# Patient Record
Sex: MALE | Race: WHITE | NOT HISPANIC OR LATINO | URBAN - METROPOLITAN AREA
[De-identification: names, ages, dates, MRNs, and addresses within clinical notes are randomized per-mention and may not be internally consistent; named-entity substitution may affect disease eponyms.]

---

## 2020-12-31 ENCOUNTER — INPATIENT (INPATIENT)
Facility: HOSPITAL | Age: 85
LOS: 14 days | Discharge: NURSING, REHAB OR LONG-TERM CARE | DRG: 870 | End: 2021-01-15
Attending: INTERNAL MEDICINE | Admitting: INTERNAL MEDICINE
Payer: MEDICARE

## 2020-12-31 VITALS — WEIGHT: 132.28 LBS

## 2020-12-31 LAB
ALBUMIN SERPL ELPH-MCNC: 2.8 G/DL — LOW (ref 3.3–5)
ALBUMIN SERPL ELPH-MCNC: 3.2 G/DL — LOW (ref 3.4–5)
ALP SERPL-CCNC: 60 U/L — SIGNIFICANT CHANGE UP (ref 40–120)
ALP SERPL-CCNC: 71 U/L — SIGNIFICANT CHANGE UP (ref 40–120)
ALT FLD-CCNC: 324 U/L — HIGH (ref 12–42)
ALT FLD-CCNC: 4551 U/L — HIGH (ref 10–45)
ANION GAP SERPL CALC-SCNC: 26 MMOL/L — HIGH (ref 9–16)
ANION GAP SERPL CALC-SCNC: 28 MMOL/L — HIGH (ref 9–16)
ANION GAP SERPL CALC-SCNC: 34 MMOL/L — HIGH (ref 5–17)
APPEARANCE UR: CLEAR — SIGNIFICANT CHANGE UP
APTT BLD: 52.3 SEC — HIGH (ref 27.5–35.5)
AST SERPL-CCNC: 540 U/L — HIGH (ref 15–37)
AST SERPL-CCNC: >7000 U/L — SIGNIFICANT CHANGE UP (ref 10–40)
BASE EXCESS BLDA CALC-SCNC: -13.7 MMOL/L — LOW (ref -2–3)
BASE EXCESS BLDA CALC-SCNC: -20.8 MMOL/L — LOW (ref -2–3)
BASOPHILS # BLD AUTO: 0 K/UL — SIGNIFICANT CHANGE UP (ref 0–0.2)
BASOPHILS # BLD AUTO: 0.01 K/UL — SIGNIFICANT CHANGE UP (ref 0–0.2)
BASOPHILS NFR BLD AUTO: 0 % — SIGNIFICANT CHANGE UP (ref 0–2)
BASOPHILS NFR BLD AUTO: 0.1 % — SIGNIFICANT CHANGE UP (ref 0–2)
BILIRUB SERPL-MCNC: 1 MG/DL — SIGNIFICANT CHANGE UP (ref 0.2–1.2)
BILIRUB SERPL-MCNC: 1.4 MG/DL — HIGH (ref 0.2–1.2)
BILIRUB UR-MCNC: NEGATIVE — SIGNIFICANT CHANGE UP
BLD GP AB SCN SERPL QL: NEGATIVE — SIGNIFICANT CHANGE UP
BLD GP AB SCN SERPL QL: NEGATIVE — SIGNIFICANT CHANGE UP
BUN SERPL-MCNC: 33 MG/DL — HIGH (ref 7–23)
BUN SERPL-MCNC: 35 MG/DL — HIGH (ref 7–23)
BUN SERPL-MCNC: 36 MG/DL — HIGH (ref 7–23)
CALCIUM SERPL-MCNC: 7.7 MG/DL — LOW (ref 8.4–10.5)
CALCIUM SERPL-MCNC: 8.2 MG/DL — LOW (ref 8.5–10.5)
CALCIUM SERPL-MCNC: 9.7 MG/DL — SIGNIFICANT CHANGE UP (ref 8.5–10.5)
CHLORIDE SERPL-SCNC: 105 MMOL/L — SIGNIFICANT CHANGE UP (ref 96–108)
CHLORIDE SERPL-SCNC: 108 MMOL/L — SIGNIFICANT CHANGE UP (ref 96–108)
CHLORIDE SERPL-SCNC: 109 MMOL/L — HIGH (ref 96–108)
CK MB CFR SERPL CALC: 204.4 NG/ML — HIGH (ref 0–6.7)
CK SERPL-CCNC: 3867 U/L — HIGH (ref 39–308)
CK SERPL-CCNC: 9158 U/L — HIGH (ref 30–200)
CO2 SERPL-SCNC: 12 MMOL/L — LOW (ref 22–31)
CO2 SERPL-SCNC: 5 MMOL/L — CRITICAL LOW (ref 22–31)
CO2 SERPL-SCNC: 9 MMOL/L — CRITICAL LOW (ref 22–31)
COLOR SPEC: YELLOW — SIGNIFICANT CHANGE UP
CREAT SERPL-MCNC: 2.17 MG/DL — HIGH (ref 0.5–1.3)
CREAT SERPL-MCNC: 2.72 MG/DL — HIGH (ref 0.5–1.3)
CREAT SERPL-MCNC: 2.93 MG/DL — HIGH (ref 0.5–1.3)
DIFF PNL FLD: NEGATIVE — SIGNIFICANT CHANGE UP
EOSINOPHIL # BLD AUTO: 0.02 K/UL — SIGNIFICANT CHANGE UP (ref 0–0.5)
EOSINOPHIL # BLD AUTO: 0.03 K/UL — SIGNIFICANT CHANGE UP (ref 0–0.5)
EOSINOPHIL NFR BLD AUTO: 0.2 % — SIGNIFICANT CHANGE UP (ref 0–6)
EOSINOPHIL NFR BLD AUTO: 0.3 % — SIGNIFICANT CHANGE UP (ref 0–6)
ETHANOL SERPL-MCNC: 26 MG/DL — HIGH
GAS PNL BLDA: SIGNIFICANT CHANGE UP
GLUCOSE BLDC GLUCOMTR-MCNC: 80 MG/DL — SIGNIFICANT CHANGE UP (ref 70–99)
GLUCOSE SERPL-MCNC: 166 MG/DL — HIGH (ref 70–99)
GLUCOSE SERPL-MCNC: 498 MG/DL — CRITICAL HIGH (ref 70–99)
GLUCOSE SERPL-MCNC: 92 MG/DL — SIGNIFICANT CHANGE UP (ref 70–99)
GLUCOSE UR QL: NEGATIVE — SIGNIFICANT CHANGE UP
HCO3 BLDA-SCNC: 11 MMOL/L — LOW (ref 21–28)
HCO3 BLDA-SCNC: 6 MMOL/L — CRITICAL LOW (ref 21–28)
HCT VFR BLD CALC: 18.4 % — CRITICAL LOW (ref 39–50)
HCT VFR BLD CALC: 26.7 % — LOW (ref 39–50)
HGB BLD-MCNC: 5.8 G/DL — CRITICAL LOW (ref 13–17)
HGB BLD-MCNC: 8.2 G/DL — LOW (ref 13–17)
IMM GRANULOCYTES NFR BLD AUTO: 0.7 % — SIGNIFICANT CHANGE UP (ref 0–1.5)
IMM GRANULOCYTES NFR BLD AUTO: 0.8 % — SIGNIFICANT CHANGE UP (ref 0–1.5)
INR BLD: 1.34 — HIGH (ref 0.88–1.16)
INR BLD: 1.99 — HIGH (ref 0.88–1.16)
KETONES UR-MCNC: NEGATIVE — SIGNIFICANT CHANGE UP
LACTATE SERPL-SCNC: 11.5 MMOL/L — CRITICAL HIGH (ref 0.5–2)
LACTATE SERPL-SCNC: 18 MMOL/L — CRITICAL HIGH (ref 0.5–2)
LACTATE SERPL-SCNC: >15 MMOL/L — CRITICAL HIGH (ref 0.4–2)
LACTATE SERPL-SCNC: >15 MMOL/L — CRITICAL HIGH (ref 0.4–2)
LEUKOCYTE ESTERASE UR-ACNC: NEGATIVE — SIGNIFICANT CHANGE UP
LYMPHOCYTES # BLD AUTO: 0.63 K/UL — LOW (ref 1–3.3)
LYMPHOCYTES # BLD AUTO: 0.7 K/UL — LOW (ref 1–3.3)
LYMPHOCYTES # BLD AUTO: 6.6 % — LOW (ref 13–44)
LYMPHOCYTES # BLD AUTO: 7 % — LOW (ref 13–44)
MAGNESIUM SERPL-MCNC: 2.2 MG/DL — SIGNIFICANT CHANGE UP (ref 1.6–2.6)
MAGNESIUM SERPL-MCNC: 3.2 MG/DL — HIGH (ref 1.6–2.6)
MCHC RBC-ENTMCNC: 30 PG — SIGNIFICANT CHANGE UP (ref 27–34)
MCHC RBC-ENTMCNC: 30.5 PG — SIGNIFICANT CHANGE UP (ref 27–34)
MCHC RBC-ENTMCNC: 30.7 GM/DL — LOW (ref 32–36)
MCHC RBC-ENTMCNC: 31.5 GM/DL — LOW (ref 32–36)
MCV RBC AUTO: 96.8 FL — SIGNIFICANT CHANGE UP (ref 80–100)
MCV RBC AUTO: 97.8 FL — SIGNIFICANT CHANGE UP (ref 80–100)
MONOCYTES # BLD AUTO: 0.36 K/UL — SIGNIFICANT CHANGE UP (ref 0–0.9)
MONOCYTES # BLD AUTO: 0.53 K/UL — SIGNIFICANT CHANGE UP (ref 0–0.9)
MONOCYTES NFR BLD AUTO: 3.6 % — SIGNIFICANT CHANGE UP (ref 2–14)
MONOCYTES NFR BLD AUTO: 5.6 % — SIGNIFICANT CHANGE UP (ref 2–14)
NEUTROPHILS # BLD AUTO: 8.25 K/UL — HIGH (ref 1.8–7.4)
NEUTROPHILS # BLD AUTO: 8.86 K/UL — HIGH (ref 1.8–7.4)
NEUTROPHILS NFR BLD AUTO: 86.6 % — HIGH (ref 43–77)
NEUTROPHILS NFR BLD AUTO: 88.5 % — HIGH (ref 43–77)
NITRITE UR-MCNC: NEGATIVE — SIGNIFICANT CHANGE UP
NRBC # BLD: 0 /100 WBCS — SIGNIFICANT CHANGE UP (ref 0–0)
NRBC # BLD: 0 /100 WBCS — SIGNIFICANT CHANGE UP (ref 0–0)
NT-PROBNP SERPL-SCNC: 363 PG/ML — HIGH
NT-PROBNP SERPL-SCNC: 438 PG/ML — HIGH (ref 0–300)
PCO2 BLDA: 15 MMHG — LOW (ref 35–48)
PCO2 BLDA: 23 MMHG — LOW (ref 35–48)
PCO2 BLDV: 30 MMHG — LOW (ref 41–51)
PCP SPEC-MCNC: SIGNIFICANT CHANGE UP
PH BLDA: 7.19 — CRITICAL LOW (ref 7.35–7.45)
PH BLDA: 7.3 — LOW (ref 7.35–7.45)
PH BLDV: 6.98 — CRITICAL LOW (ref 7.32–7.43)
PH UR: 5.5 — SIGNIFICANT CHANGE UP (ref 5–8)
PHOSPHATE SERPL-MCNC: 9.3 MG/DL — HIGH (ref 2.5–4.5)
PLATELET # BLD AUTO: 56 K/UL — LOW (ref 150–400)
PLATELET # BLD AUTO: 70 K/UL — LOW (ref 150–400)
PO2 BLDA: 265 MMHG — HIGH (ref 83–108)
PO2 BLDA: 533 MMHG — HIGH (ref 83–108)
PO2 BLDV: 16 MMHG — LOW (ref 35–40)
POTASSIUM SERPL-MCNC: 4.9 MMOL/L — SIGNIFICANT CHANGE UP (ref 3.5–5.3)
POTASSIUM SERPL-MCNC: 5.5 MMOL/L — HIGH (ref 3.5–5.3)
POTASSIUM SERPL-MCNC: 6.7 MMOL/L — CRITICAL HIGH (ref 3.5–5.3)
POTASSIUM SERPL-SCNC: 4.9 MMOL/L — SIGNIFICANT CHANGE UP (ref 3.5–5.3)
POTASSIUM SERPL-SCNC: 5.5 MMOL/L — HIGH (ref 3.5–5.3)
POTASSIUM SERPL-SCNC: 6.7 MMOL/L — CRITICAL HIGH (ref 3.5–5.3)
PROT SERPL-MCNC: 5 G/DL — LOW (ref 6–8.3)
PROT SERPL-MCNC: 6.7 G/DL — SIGNIFICANT CHANGE UP (ref 6.4–8.2)
PROT UR-MCNC: NEGATIVE MG/DL — SIGNIFICANT CHANGE UP
PROTHROM AB SERPL-ACNC: 15.6 SEC — HIGH (ref 10.6–13.6)
PROTHROM AB SERPL-ACNC: 23.1 SEC — HIGH (ref 10.6–13.6)
RBC # BLD: 1.9 M/UL — LOW (ref 4.2–5.8)
RBC # BLD: 2.73 M/UL — LOW (ref 4.2–5.8)
RBC # FLD: 14.6 % — HIGH (ref 10.3–14.5)
RBC # FLD: 14.7 % — HIGH (ref 10.3–14.5)
RH IG SCN BLD-IMP: POSITIVE — SIGNIFICANT CHANGE UP
RH IG SCN BLD-IMP: POSITIVE — SIGNIFICANT CHANGE UP
SAO2 % BLDA: 100 % — SIGNIFICANT CHANGE UP (ref 95–100)
SAO2 % BLDA: 99 % — SIGNIFICANT CHANGE UP (ref 95–100)
SAO2 % BLDV: 11 % — SIGNIFICANT CHANGE UP
SARS-COV-2 RNA SPEC QL NAA+PROBE: SIGNIFICANT CHANGE UP
SODIUM SERPL-SCNC: 144 MMOL/L — SIGNIFICANT CHANGE UP (ref 132–145)
SODIUM SERPL-SCNC: 145 MMOL/L — SIGNIFICANT CHANGE UP (ref 132–145)
SODIUM SERPL-SCNC: 147 MMOL/L — HIGH (ref 135–145)
SP GR SPEC: <=1.005 — SIGNIFICANT CHANGE UP (ref 1–1.03)
TROPONIN I SERPL-MCNC: 0.25 NG/ML — HIGH (ref 0.02–0.06)
TROPONIN T SERPL-MCNC: 0.14 NG/ML — CRITICAL HIGH (ref 0–0.01)
UROBILINOGEN FLD QL: 0.2 E.U./DL — SIGNIFICANT CHANGE UP
WBC # BLD: 10.01 K/UL — SIGNIFICANT CHANGE UP (ref 3.8–10.5)
WBC # BLD: 9.53 K/UL — SIGNIFICANT CHANGE UP (ref 3.8–10.5)
WBC # FLD AUTO: 10.01 K/UL — SIGNIFICANT CHANGE UP (ref 3.8–10.5)
WBC # FLD AUTO: 9.53 K/UL — SIGNIFICANT CHANGE UP (ref 3.8–10.5)

## 2020-12-31 PROCEDURE — 93010 ELECTROCARDIOGRAM REPORT: CPT

## 2020-12-31 PROCEDURE — 76937 US GUIDE VASCULAR ACCESS: CPT | Mod: 26

## 2020-12-31 PROCEDURE — 36000 PLACE NEEDLE IN VEIN: CPT | Mod: 59

## 2020-12-31 PROCEDURE — 71045 X-RAY EXAM CHEST 1 VIEW: CPT | Mod: 26,77

## 2020-12-31 PROCEDURE — 36010 PLACE CATHETER IN VEIN: CPT

## 2020-12-31 PROCEDURE — 43753 TX GASTRO INTUB W/ASP: CPT

## 2020-12-31 PROCEDURE — 99291 CRITICAL CARE FIRST HOUR: CPT

## 2020-12-31 PROCEDURE — 74174 CTA ABD&PLVS W/CONTRAST: CPT | Mod: 26

## 2020-12-31 PROCEDURE — 36620 INSERTION CATHETER ARTERY: CPT

## 2020-12-31 PROCEDURE — 70450 CT HEAD/BRAIN W/O DYE: CPT | Mod: 26

## 2020-12-31 PROCEDURE — 71275 CT ANGIOGRAPHY CHEST: CPT | Mod: 26

## 2020-12-31 PROCEDURE — 71045 X-RAY EXAM CHEST 1 VIEW: CPT | Mod: 26

## 2020-12-31 RX ORDER — OLANZAPINE 15 MG/1
10 TABLET, FILM COATED ORAL ONCE
Refills: 0 | Status: DISCONTINUED | OUTPATIENT
Start: 2020-12-31 | End: 2020-12-31

## 2020-12-31 RX ORDER — ACETYLCYSTEINE 200 MG/ML
9 VIAL (ML) MISCELLANEOUS ONCE
Refills: 0 | Status: COMPLETED | OUTPATIENT
Start: 2020-12-31 | End: 2021-01-01

## 2020-12-31 RX ORDER — DEXTROSE 50 % IN WATER 50 %
50 SYRINGE (ML) INTRAVENOUS ONCE
Refills: 0 | Status: COMPLETED | OUTPATIENT
Start: 2020-12-31 | End: 2020-12-31

## 2020-12-31 RX ORDER — ACETYLCYSTEINE 200 MG/ML
3 VIAL (ML) MISCELLANEOUS ONCE
Refills: 0 | Status: COMPLETED | OUTPATIENT
Start: 2020-12-31 | End: 2021-01-01

## 2020-12-31 RX ORDER — SODIUM BICARBONATE 1 MEQ/ML
0.5 SYRINGE (ML) INTRAVENOUS
Qty: 150 | Refills: 0 | Status: DISCONTINUED | OUTPATIENT
Start: 2020-12-31 | End: 2020-12-31

## 2020-12-31 RX ORDER — SODIUM BICARBONATE 1 MEQ/ML
50 SYRINGE (ML) INTRAVENOUS ONCE
Refills: 0 | Status: COMPLETED | OUTPATIENT
Start: 2020-12-31 | End: 2020-12-31

## 2020-12-31 RX ORDER — SODIUM BICARBONATE 1 MEQ/ML
50 SYRINGE (ML) INTRAVENOUS ONCE
Refills: 0 | Status: DISCONTINUED | OUTPATIENT
Start: 2020-12-31 | End: 2020-12-31

## 2020-12-31 RX ORDER — OCTREOTIDE ACETATE 200 UG/ML
50 INJECTION, SOLUTION INTRAVENOUS; SUBCUTANEOUS
Qty: 500 | Refills: 0 | Status: DISCONTINUED | OUTPATIENT
Start: 2020-12-31 | End: 2021-01-01

## 2020-12-31 RX ORDER — SODIUM CHLORIDE 9 MG/ML
1000 INJECTION, SOLUTION INTRAVENOUS
Refills: 0 | Status: DISCONTINUED | OUTPATIENT
Start: 2020-12-31 | End: 2020-12-31

## 2020-12-31 RX ORDER — FENTANYL CITRATE 50 UG/ML
50 INJECTION INTRAVENOUS ONCE
Refills: 0 | Status: DISCONTINUED | OUTPATIENT
Start: 2020-12-31 | End: 2020-12-31

## 2020-12-31 RX ORDER — ACETYLCYSTEINE 200 MG/ML
6 VIAL (ML) MISCELLANEOUS ONCE
Refills: 0 | Status: COMPLETED | OUTPATIENT
Start: 2020-12-31 | End: 2021-01-01

## 2020-12-31 RX ORDER — ETOMIDATE 2 MG/ML
20 INJECTION INTRAVENOUS ONCE
Refills: 0 | Status: DISCONTINUED | OUTPATIENT
Start: 2020-12-31 | End: 2020-12-31

## 2020-12-31 RX ORDER — VASOPRESSIN 20 [USP'U]/ML
0.04 INJECTION INTRAVENOUS
Qty: 50 | Refills: 0 | Status: DISCONTINUED | OUTPATIENT
Start: 2020-12-31 | End: 2021-01-01

## 2020-12-31 RX ORDER — SODIUM BICARBONATE 1 MEQ/ML
1 SYRINGE (ML) INTRAVENOUS
Qty: 150 | Refills: 0 | Status: DISCONTINUED | OUTPATIENT
Start: 2020-12-31 | End: 2020-12-31

## 2020-12-31 RX ORDER — PANTOPRAZOLE SODIUM 20 MG/1
8 TABLET, DELAYED RELEASE ORAL
Qty: 80 | Refills: 0 | Status: DISCONTINUED | OUTPATIENT
Start: 2020-12-31 | End: 2021-01-01

## 2020-12-31 RX ORDER — CALCIUM GLUCONATE 100 MG/ML
2 VIAL (ML) INTRAVENOUS ONCE
Refills: 0 | Status: COMPLETED | OUTPATIENT
Start: 2020-12-31 | End: 2020-12-31

## 2020-12-31 RX ORDER — INSULIN HUMAN 100 [IU]/ML
10 INJECTION, SOLUTION SUBCUTANEOUS ONCE
Refills: 0 | Status: COMPLETED | OUTPATIENT
Start: 2020-12-31 | End: 2020-12-31

## 2020-12-31 RX ORDER — PHYTONADIONE (VIT K1) 5 MG
5 TABLET ORAL ONCE
Refills: 0 | Status: COMPLETED | OUTPATIENT
Start: 2020-12-31 | End: 2020-12-31

## 2020-12-31 RX ORDER — SODIUM BICARBONATE 1 MEQ/ML
0.25 SYRINGE (ML) INTRAVENOUS
Qty: 150 | Refills: 0 | Status: DISCONTINUED | OUTPATIENT
Start: 2020-12-31 | End: 2021-01-01

## 2020-12-31 RX ORDER — PROPOFOL 10 MG/ML
5 INJECTION, EMULSION INTRAVENOUS
Qty: 1000 | Refills: 0 | Status: DISCONTINUED | OUTPATIENT
Start: 2020-12-31 | End: 2021-01-06

## 2020-12-31 RX ORDER — SODIUM CHLORIDE 9 MG/ML
1000 INJECTION INTRAMUSCULAR; INTRAVENOUS; SUBCUTANEOUS ONCE
Refills: 0 | Status: COMPLETED | OUTPATIENT
Start: 2020-12-31 | End: 2020-12-31

## 2020-12-31 RX ORDER — CHLORHEXIDINE GLUCONATE 213 G/1000ML
15 SOLUTION TOPICAL EVERY 12 HOURS
Refills: 0 | Status: DISCONTINUED | OUTPATIENT
Start: 2020-12-31 | End: 2021-01-07

## 2020-12-31 RX ORDER — SODIUM CHLORIDE 9 MG/ML
1850 INJECTION INTRAMUSCULAR; INTRAVENOUS; SUBCUTANEOUS ONCE
Refills: 0 | Status: COMPLETED | OUTPATIENT
Start: 2020-12-31 | End: 2020-12-31

## 2020-12-31 RX ORDER — ROCURONIUM BROMIDE 10 MG/ML
80 VIAL (ML) INTRAVENOUS ONCE
Refills: 0 | Status: DISCONTINUED | OUTPATIENT
Start: 2020-12-31 | End: 2020-12-31

## 2020-12-31 RX ORDER — MIDAZOLAM HYDROCHLORIDE 1 MG/ML
2 INJECTION, SOLUTION INTRAMUSCULAR; INTRAVENOUS ONCE
Refills: 0 | Status: DISCONTINUED | OUTPATIENT
Start: 2020-12-31 | End: 2020-12-31

## 2020-12-31 RX ORDER — NOREPINEPHRINE BITARTRATE/D5W 8 MG/250ML
0.05 PLASTIC BAG, INJECTION (ML) INTRAVENOUS
Qty: 8 | Refills: 0 | Status: DISCONTINUED | OUTPATIENT
Start: 2020-12-31 | End: 2021-01-01

## 2020-12-31 RX ORDER — CEFTRIAXONE 500 MG/1
1000 INJECTION, POWDER, FOR SOLUTION INTRAMUSCULAR; INTRAVENOUS ONCE
Refills: 0 | Status: COMPLETED | OUTPATIENT
Start: 2020-12-31 | End: 2020-12-31

## 2020-12-31 RX ORDER — CHLORHEXIDINE GLUCONATE 213 G/1000ML
1 SOLUTION TOPICAL
Refills: 0 | Status: DISCONTINUED | OUTPATIENT
Start: 2020-12-31 | End: 2021-01-05

## 2020-12-31 RX ORDER — PHYTONADIONE (VIT K1) 5 MG
10 TABLET ORAL ONCE
Refills: 0 | Status: DISCONTINUED | OUTPATIENT
Start: 2020-12-31 | End: 2020-12-31

## 2020-12-31 RX ORDER — OLANZAPINE 15 MG/1
10 TABLET, FILM COATED ORAL ONCE
Refills: 0 | Status: COMPLETED | OUTPATIENT
Start: 2020-12-31 | End: 2020-12-31

## 2020-12-31 RX ORDER — THIAMINE MONONITRATE (VIT B1) 100 MG
100 TABLET ORAL DAILY
Refills: 0 | Status: DISCONTINUED | OUTPATIENT
Start: 2020-12-31 | End: 2021-01-01

## 2020-12-31 RX ORDER — PROPOFOL 10 MG/ML
20 INJECTION, EMULSION INTRAVENOUS
Qty: 1000 | Refills: 0 | Status: DISCONTINUED | OUTPATIENT
Start: 2020-12-31 | End: 2020-12-31

## 2020-12-31 RX ADMIN — Medication 4.26 MICROGRAM(S)/KG/MIN: at 14:29

## 2020-12-31 RX ADMIN — SODIUM CHLORIDE 1000 MILLILITER(S): 9 INJECTION INTRAMUSCULAR; INTRAVENOUS; SUBCUTANEOUS at 15:02

## 2020-12-31 RX ADMIN — FENTANYL CITRATE 50 MICROGRAM(S): 50 INJECTION INTRAVENOUS at 19:49

## 2020-12-31 RX ADMIN — CEFTRIAXONE 1000 MILLIGRAM(S): 500 INJECTION, POWDER, FOR SOLUTION INTRAMUSCULAR; INTRAVENOUS at 12:18

## 2020-12-31 RX ADMIN — SODIUM CHLORIDE 1850 MILLILITER(S): 9 INJECTION INTRAMUSCULAR; INTRAVENOUS; SUBCUTANEOUS at 11:38

## 2020-12-31 RX ADMIN — VASOPRESSIN 2.4 UNIT(S)/MIN: 20 INJECTION INTRAVENOUS at 23:11

## 2020-12-31 RX ADMIN — FENTANYL CITRATE 50 MICROGRAM(S): 50 INJECTION INTRAVENOUS at 20:20

## 2020-12-31 RX ADMIN — FENTANYL CITRATE 50 MICROGRAM(S): 50 INJECTION INTRAVENOUS at 20:04

## 2020-12-31 RX ADMIN — Medication 4.26 MICROGRAM(S)/KG/MIN: at 18:25

## 2020-12-31 RX ADMIN — MIDAZOLAM HYDROCHLORIDE 2 MILLIGRAM(S): 1 INJECTION, SOLUTION INTRAMUSCULAR; INTRAVENOUS at 20:05

## 2020-12-31 RX ADMIN — SODIUM CHLORIDE 1000 MILLILITER(S): 9 INJECTION INTRAMUSCULAR; INTRAVENOUS; SUBCUTANEOUS at 14:28

## 2020-12-31 RX ADMIN — Medication 200 GRAM(S): at 12:41

## 2020-12-31 RX ADMIN — Medication 151 MEQ/KG/HR: at 17:04

## 2020-12-31 RX ADMIN — Medication 100 MEQ/KG/HR: at 19:47

## 2020-12-31 RX ADMIN — Medication 50 MILLIEQUIVALENT(S): at 18:51

## 2020-12-31 RX ADMIN — SODIUM CHLORIDE 4000 MILLILITER(S): 9 INJECTION INTRAMUSCULAR; INTRAVENOUS; SUBCUTANEOUS at 19:48

## 2020-12-31 RX ADMIN — CEFTRIAXONE 100 MILLIGRAM(S): 500 INJECTION, POWDER, FOR SOLUTION INTRAMUSCULAR; INTRAVENOUS at 11:37

## 2020-12-31 RX ADMIN — Medication 50 MILLIEQUIVALENT(S): at 16:10

## 2020-12-31 RX ADMIN — PROPOFOL 1.8 MICROGRAM(S)/KG/MIN: 10 INJECTION, EMULSION INTRAVENOUS at 19:48

## 2020-12-31 RX ADMIN — Medication 2 GRAM(S): at 13:17

## 2020-12-31 RX ADMIN — OLANZAPINE 10 MILLIGRAM(S): 15 TABLET, FILM COATED ORAL at 10:45

## 2020-12-31 RX ADMIN — Medication 50 MILLILITER(S): at 11:38

## 2020-12-31 RX ADMIN — OCTREOTIDE ACETATE 10 MICROGRAM(S)/HR: 200 INJECTION, SOLUTION INTRAVENOUS; SUBCUTANEOUS at 21:08

## 2020-12-31 RX ADMIN — INSULIN HUMAN 10 UNIT(S): 100 INJECTION, SOLUTION SUBCUTANEOUS at 12:41
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
## 2020-12-31 NOTE — ED PROVIDER NOTE - CRITICAL CARE PROVIDED
direct patient care (not related to procedure) direct patient care (not related to procedure)/additional history taking/interpretation of diagnostic studies/documentation/consultation with other physicians

## 2020-12-31 NOTE — ED PROVIDER NOTE - PHYSICAL EXAMINATION
VITAL SIGNS: I have reviewed nursing notes and confirm.  CONSTITUTIONAL: Frail, elderly appearing with AMS in distress.  SKIN: Skin is cold and dry, no acute rash.  HEAD: Normocephalic; atraumatic.  EYES: PERRL, EOM intact; conjunctiva and sclera clear.  ENT: No nasal discharge; airway clear.  NECK: Supple; non tender.  CARD: S1, S2 normal; no murmurs, gallops, or rubs. Regular rate and rhythm.  RESP: No wheezes, rales or rhonchi.  ABD: Normal bowel sounds; soft; non-distended; non-tender; no hepatosplenomegaly.  EXT: Normal ROM. No clubbing, cyanosis or edema.  NEURO: Alert, oriented. Grossly unremarkable.  PSYCH: Cooperative, appropriate. VITAL SIGNS: I have reviewed nursing notes and confirm.  CONSTITUTIONAL: Frail, elderly appearing, cold to touch, awake and responsive but globally altered, + in distress.  SKIN: Skin is cold and dry, no acute rash.  HEAD: Normocephalic; atraumatic.  EYES: PERRL, EOM intact; conjunctiva and sclera clear.  ENT: No nasal discharge; airway clear.  NECK: Supple; non tender.  CARD: S1, S2 normal; no murmurs, gallops, or rubs. Regular rate and rhythm.  RESP: No increased work of breathing, No wheezes, rales or rhonchi.  ABD: Normal bowel sounds; soft; non-distended; non-tender; no hepatosplenomegaly.  EXT: Normal passive ROM. No clubbing, cyanosis or edema. + ecchymoses in various stages of healing to LE's b/l. distal pulses intact all ext  NEURO: Alert, oriented to self only. No facial asymmetry, 5/5 strength all ext w/strong purposeful movements being made globally, gait deferred, no identifiable sensory deficits -- limited neuro exam given AMS/inability to follow commands

## 2020-12-31 NOTE — ED ADULT NURSE REASSESSMENT NOTE - NS ED NURSE REASSESS COMMENT FT1
Patient VBG with metabolic acidosis, sodium bicarbonate 50meq/50mlk IV push given. Waiting for sodium bicarb drip. Patient still a little disoriented but can be refocused by verbal instruction. Vital signs stable.  Waiting for transport  (report given  about 30 mins ago and accepted by BALJIT Martinez).

## 2020-12-31 NOTE — H&P ADULT - NSHPSOCIALHISTORY_GEN_ALL_CORE
Tobacco: unclear  EtOH: history of EtOH use as per ED provider  Illicit Drug: unclear  Has sister Annalisa Domínguez

## 2020-12-31 NOTE — PROCEDURE NOTE - NSPOSTCAREGUIDE_GEN_A_CORE
Verbal/written post procedure instructions were given to patient/caregiver/Instructed patient/caregiver regarding signs and symptoms of infection
Verbal/written post procedure instructions were given to patient/caregiver/Instructed patient/caregiver regarding signs and symptoms of infection/Keep the cast/splint/dressing clean and dry/Care for catheter as per unit/ICU protocols
Verbal/written post procedure instructions were given to patient/caregiver/Instructed patient/caregiver regarding signs and symptoms of infection/Keep the cast/splint/dressing clean and dry/Care for catheter as per unit/ICU protocols
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
## 2020-12-31 NOTE — H&P ADULT - NSHPLABSRESULTS_GEN_ALL_CORE
.  LABS:                         5.8    10.01 )-----------( 56       ( 31 Dec 2020 18:37 )             18.4     12-31    147<H>  |  108  |  33<H>  ----------------------------<  92  5.5<H>   |  5<LL>  |  2.17<H>    Ca    7.7<L>      31 Dec 2020 18:37  Phos  9.3     12-31  Mg     2.2     12-31    TPro  5.0<L>  /  Alb  2.8<L>  /  TBili  1.4<H>  /  DBili  x   /  AST  >7000  /  ALT  4551<H>  /  AlkPhos  60  12-31    PT/INR - ( 31 Dec 2020 18:37 )   PT: 23.1 sec;   INR: 1.99          PTT - ( 31 Dec 2020 11:41 )  PTT:52.3 sec  Urinalysis Basic - ( 31 Dec 2020 11:53 )    Color: Yellow / Appearance: Clear / SG: <=1.005 / pH: x  Gluc: x / Ketone: NEGATIVE  / Bili: NEGATIVE / Urobili: 0.2 E.U./dL   Blood: x / Protein: NEGATIVE mg/dL / Nitrite: NEGATIVE   Leuk Esterase: NEGATIVE / RBC: x / WBC x   Sq Epi: x / Non Sq Epi: x / Bacteria: x      CARDIAC MARKERS ( 31 Dec 2020 18:37 )  x     / 0.14 ng/mL / 9158 U/L / x     / 204.4 ng/mL  CARDIAC MARKERS ( 31 Dec 2020 14:09 )  0.252 ng/mL / x     / x     / x     / x      CARDIAC MARKERS ( 31 Dec 2020 13:11 )  x     / x     / 3867 U/L / x     / x          Serum Pro-Brain Natriuretic Peptide: 438 pg/mL (12-31 @ 18:37)  Serum Pro-Brain Natriuretic Peptide: 363 pg/mL (12-31 @ 14:09)    Lactate, Blood: 18.0 mmol/L (12-31 @ 18:37)  Lactate, Blood: >15.0 mmoL/L (12-31 @ 14:09)  Lactate, Blood: >15.0 mmoL/L (12-31 @ 11:31)      RADIOLOGY, EKG & ADDITIONAL TESTS: Reviewed.
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
## 2020-12-31 NOTE — H&P ADULT - NSHPPHYSICALEXAM_GEN_ALL_CORE
.  VITAL SIGNS:  T(C): 35.1 (12-31-20 @ 22:58), Max: 35.1 (12-31-20 @ 22:58)  T(F): 95.2 (12-31-20 @ 22:58), Max: 95.2 (12-31-20 @ 22:58)  HR: 85 (12-31-20 @ 23:00) (66 - 116)  BP: 92/53 (12-31-20 @ 21:00) (60/32 - 169/76)  BP(mean): 68 (12-31-20 @ 21:00) (68 - 123)  RR: 20 (12-31-20 @ 23:00) (19 - 31)  SpO2: 100% (12-31-20 @ 23:00) (99% - 100%)  Wt(kg): --    PHYSICAL EXAM:    Constitutional: WDWN resting comfortably in bed; NAD  Head: NC/AT  Eyes: PERRL, EOMI, anicteric sclera  ENT: no nasal discharge; uvula midline, no oropharyngeal erythema or exudates; MMM  Neck: supple; no JVD or thyromegaly  Respiratory: CTA B/L; no W/R/R, no retractions  Cardiac: +S1/S2; RRR; no M/R/G; PMI non-displaced  Gastrointestinal: abdomen soft, NT/ND; no rebound or guarding; +BSx4  Genitourinary: normal external genitalia  Back: spine midline, no bony tenderness or step-offs; no CVAT B/L  Extremities: WWP, no clubbing or cyanosis; no peripheral edema  Musculoskeletal: NROM x4; no joint swelling, tenderness or erythema  Vascular: 2+ radial, femoral, DP/PT pulses B/L  Dermatologic: skin warm, dry and intact; no rashes, wounds, or scars  Lymphatic: no submandibular or cervical LAD  Neurologic: AAOx3; CNII-XII grossly intact; no focal deficits  Psychiatric: affect and characteristics of appearance, verbalizations, behaviors are appropriate

## 2020-12-31 NOTE — ED PROVIDER NOTE - PROGRESS NOTE DETAILS
Despite fluid hydration pt remained hypotensive with MAP <60, altered and attempting to crawl out of bed, poorly directable. Given zyprexa w/no effect. Placed on norepi w/normalization of BP, and pt's mental status greatly improved. Now lying calmly in stretcher, legs crossed, talkative and directable. Plans to possibly intubate pt aborted.

## 2020-12-31 NOTE — ED ADULT TRIAGE NOTE - CHIEF COMPLAINT QUOTE
arrives by EMS for hypoglycemia, - as per EMS pt wasn't able to obtain pulse ox or administer oral glucose due to being AMS. Pt is cold to touch and has been outside for an unknown amount of time

## 2021-01-01 LAB
ALBUMIN SERPL ELPH-MCNC: 2.4 G/DL — LOW (ref 3.3–5)
ALBUMIN SERPL ELPH-MCNC: 2.5 G/DL — LOW (ref 3.3–5)
ALBUMIN SERPL ELPH-MCNC: 2.7 G/DL — LOW (ref 3.3–5)
ALBUMIN SERPL ELPH-MCNC: 2.8 G/DL — LOW (ref 3.3–5)
ALP SERPL-CCNC: 63 U/L — SIGNIFICANT CHANGE UP (ref 40–120)
ALP SERPL-CCNC: 63 U/L — SIGNIFICANT CHANGE UP (ref 40–120)
ALP SERPL-CCNC: 69 U/L — SIGNIFICANT CHANGE UP (ref 40–120)
ALP SERPL-CCNC: 75 U/L — SIGNIFICANT CHANGE UP (ref 40–120)
ALT FLD-CCNC: 3362 U/L — HIGH (ref 10–45)
ALT FLD-CCNC: 3424 U/L — HIGH (ref 10–45)
ALT FLD-CCNC: 4181 U/L — HIGH (ref 10–45)
ALT FLD-CCNC: 4515 U/L — HIGH (ref 10–45)
AMYLASE P1 CFR SERPL: 277 U/L — HIGH (ref 25–125)
ANION GAP SERPL CALC-SCNC: 20 MMOL/L — HIGH (ref 5–17)
ANION GAP SERPL CALC-SCNC: 22 MMOL/L — HIGH (ref 5–17)
ANION GAP SERPL CALC-SCNC: 24 MMOL/L — HIGH (ref 5–17)
ANION GAP SERPL CALC-SCNC: 26 MMOL/L — HIGH (ref 5–17)
ANION GAP SERPL CALC-SCNC: 28 MMOL/L — HIGH (ref 5–17)
APAP SERPL-MCNC: <5 UG/ML — LOW (ref 10–30)
APTT BLD: 32.7 SEC — SIGNIFICANT CHANGE UP (ref 27.5–35.5)
APTT BLD: 34.3 SEC — SIGNIFICANT CHANGE UP (ref 27.5–35.5)
APTT BLD: 44.3 SEC — HIGH (ref 27.5–35.5)
AST SERPL-CCNC: >7000 U/L — HIGH (ref 10–40)
AST SERPL-CCNC: >7000 U/L — SIGNIFICANT CHANGE UP (ref 10–40)
BASE EXCESS BLDA CALC-SCNC: -7.6 MMOL/L — LOW (ref -2–3)
BASE EXCESS BLDV CALC-SCNC: -4.9 MMOL/L — SIGNIFICANT CHANGE UP
BILIRUB SERPL-MCNC: 2.3 MG/DL — HIGH (ref 0.2–1.2)
BILIRUB SERPL-MCNC: 2.7 MG/DL — HIGH (ref 0.2–1.2)
BILIRUB SERPL-MCNC: 2.8 MG/DL — HIGH (ref 0.2–1.2)
BILIRUB SERPL-MCNC: 3 MG/DL — HIGH (ref 0.2–1.2)
BUN SERPL-MCNC: 35 MG/DL — HIGH (ref 7–23)
BUN SERPL-MCNC: 40 MG/DL — HIGH (ref 7–23)
BUN SERPL-MCNC: 44 MG/DL — HIGH (ref 7–23)
BUN SERPL-MCNC: 46 MG/DL — HIGH (ref 7–23)
BUN SERPL-MCNC: 46 MG/DL — HIGH (ref 7–23)
CA-I SERPL-SCNC: 0.92 MMOL/L — LOW (ref 1.12–1.3)
CALCIUM SERPL-MCNC: 7 MG/DL — LOW (ref 8.4–10.5)
CALCIUM SERPL-MCNC: 7 MG/DL — LOW (ref 8.4–10.5)
CALCIUM SERPL-MCNC: 7.3 MG/DL — LOW (ref 8.4–10.5)
CALCIUM SERPL-MCNC: 7.3 MG/DL — LOW (ref 8.4–10.5)
CALCIUM SERPL-MCNC: 7.6 MG/DL — LOW (ref 8.4–10.5)
CHLORIDE SERPL-SCNC: 104 MMOL/L — SIGNIFICANT CHANGE UP (ref 96–108)
CHLORIDE SERPL-SCNC: 96 MMOL/L — SIGNIFICANT CHANGE UP (ref 96–108)
CHLORIDE SERPL-SCNC: 99 MMOL/L — SIGNIFICANT CHANGE UP (ref 96–108)
CK SERPL-CCNC: 9702 U/L — HIGH (ref 30–200)
CO2 SERPL-SCNC: 11 MMOL/L — LOW (ref 22–31)
CO2 SERPL-SCNC: 14 MMOL/L — LOW (ref 22–31)
CO2 SERPL-SCNC: 17 MMOL/L — LOW (ref 22–31)
CO2 SERPL-SCNC: 17 MMOL/L — LOW (ref 22–31)
CO2 SERPL-SCNC: 20 MMOL/L — LOW (ref 22–31)
CREAT ?TM UR-MCNC: 25 MG/DL — SIGNIFICANT CHANGE UP
CREAT SERPL-MCNC: 2.26 MG/DL — HIGH (ref 0.5–1.3)
CREAT SERPL-MCNC: 2.55 MG/DL — HIGH (ref 0.5–1.3)
CREAT SERPL-MCNC: 2.63 MG/DL — HIGH (ref 0.5–1.3)
CREAT SERPL-MCNC: 2.73 MG/DL — HIGH (ref 0.5–1.3)
CREAT SERPL-MCNC: 3.23 MG/DL — HIGH (ref 0.5–1.3)
FERRITIN SERPL-MCNC: HIGH NG/ML (ref 30–400)
FIBRINOGEN PPP-MCNC: <80 MG/DL — CRITICAL LOW (ref 258–438)
GAS PNL BLDV: 132 MMOL/L — LOW (ref 138–146)
GAS PNL BLDV: SIGNIFICANT CHANGE UP
GLUCOSE BLDC GLUCOMTR-MCNC: 112 MG/DL — HIGH (ref 70–99)
GLUCOSE BLDC GLUCOMTR-MCNC: 113 MG/DL — HIGH (ref 70–99)
GLUCOSE BLDC GLUCOMTR-MCNC: 143 MG/DL — HIGH (ref 70–99)
GLUCOSE BLDC GLUCOMTR-MCNC: 199 MG/DL — HIGH (ref 70–99)
GLUCOSE BLDC GLUCOMTR-MCNC: 275 MG/DL — HIGH (ref 70–99)
GLUCOSE BLDC GLUCOMTR-MCNC: 291 MG/DL — HIGH (ref 70–99)
GLUCOSE BLDC GLUCOMTR-MCNC: 342 MG/DL — HIGH (ref 70–99)
GLUCOSE BLDC GLUCOMTR-MCNC: 68 MG/DL — LOW (ref 70–99)
GLUCOSE BLDC GLUCOMTR-MCNC: 96 MG/DL — SIGNIFICANT CHANGE UP (ref 70–99)
GLUCOSE BLDC GLUCOMTR-MCNC: 97 MG/DL — SIGNIFICANT CHANGE UP (ref 70–99)
GLUCOSE SERPL-MCNC: 162 MG/DL — HIGH (ref 70–99)
GLUCOSE SERPL-MCNC: 303 MG/DL — HIGH (ref 70–99)
GLUCOSE SERPL-MCNC: 316 MG/DL — HIGH (ref 70–99)
GLUCOSE SERPL-MCNC: 414 MG/DL — HIGH (ref 70–99)
GLUCOSE SERPL-MCNC: 70 MG/DL — SIGNIFICANT CHANGE UP (ref 70–99)
HAPTOGLOB SERPL-MCNC: <10 MG/DL — LOW (ref 34–200)
HAV IGM SER-ACNC: SIGNIFICANT CHANGE UP
HBV CORE IGM SER-ACNC: SIGNIFICANT CHANGE UP
HBV SURFACE AG SER-ACNC: SIGNIFICANT CHANGE UP
HCO3 BLDA-SCNC: 17 MMOL/L — LOW (ref 21–28)
HCO3 BLDV-SCNC: 20 MMOL/L — SIGNIFICANT CHANGE UP (ref 20–27)
HCT VFR BLD CALC: 22.3 % — LOW (ref 39–50)
HCT VFR BLD CALC: 23 % — LOW (ref 39–50)
HCT VFR BLD CALC: 24.4 % — LOW (ref 39–50)
HCT VFR BLD CALC: 26.5 % — LOW (ref 39–50)
HCV AB S/CO SERPL IA: 0.12 S/CO — SIGNIFICANT CHANGE UP
HCV AB SERPL-IMP: SIGNIFICANT CHANGE UP
HGB BLD-MCNC: 7.8 G/DL — LOW (ref 13–17)
HGB BLD-MCNC: 8.3 G/DL — LOW (ref 13–17)
HGB BLD-MCNC: 8.8 G/DL — LOW (ref 13–17)
HGB BLD-MCNC: 8.8 G/DL — LOW (ref 13–17)
INR BLD: 1.75 — HIGH (ref 0.88–1.16)
INR BLD: 1.89 — HIGH (ref 0.88–1.16)
INR BLD: 2.39 — HIGH (ref 0.88–1.16)
IRON SATN MFR SERPL: 49 % — SIGNIFICANT CHANGE UP (ref 16–55)
IRON SATN MFR SERPL: 96 UG/DL — SIGNIFICANT CHANGE UP (ref 45–165)
LACTATE SERPL-SCNC: 4.6 MMOL/L — CRITICAL HIGH (ref 0.5–2)
LACTATE SERPL-SCNC: 8.7 MMOL/L — CRITICAL HIGH (ref 0.5–2)
LACTATE SERPL-SCNC: 9.6 MMOL/L — CRITICAL HIGH (ref 0.5–2)
LDH SERPL L TO P-CCNC: >2500 U/L — HIGH (ref 50–242)
LIDOCAIN IGE QN: 83 U/L — HIGH (ref 7–60)
MAGNESIUM SERPL-MCNC: 1.6 MG/DL — SIGNIFICANT CHANGE UP (ref 1.6–2.6)
MAGNESIUM SERPL-MCNC: 1.7 MG/DL — SIGNIFICANT CHANGE UP (ref 1.6–2.6)
MAGNESIUM SERPL-MCNC: 1.7 MG/DL — SIGNIFICANT CHANGE UP (ref 1.6–2.6)
MAGNESIUM SERPL-MCNC: 1.8 MG/DL — SIGNIFICANT CHANGE UP (ref 1.6–2.6)
MCHC RBC-ENTMCNC: 30.3 PG — SIGNIFICANT CHANGE UP (ref 27–34)
MCHC RBC-ENTMCNC: 30.7 PG — SIGNIFICANT CHANGE UP (ref 27–34)
MCHC RBC-ENTMCNC: 30.9 PG — SIGNIFICANT CHANGE UP (ref 27–34)
MCHC RBC-ENTMCNC: 31.6 PG — SIGNIFICANT CHANGE UP (ref 27–34)
MCHC RBC-ENTMCNC: 33.2 GM/DL — SIGNIFICANT CHANGE UP (ref 32–36)
MCHC RBC-ENTMCNC: 35 GM/DL — SIGNIFICANT CHANGE UP (ref 32–36)
MCHC RBC-ENTMCNC: 36.1 GM/DL — HIGH (ref 32–36)
MCHC RBC-ENTMCNC: 36.1 GM/DL — HIGH (ref 32–36)
MCV RBC AUTO: 85.6 FL — SIGNIFICANT CHANGE UP (ref 80–100)
MCV RBC AUTO: 87.5 FL — SIGNIFICANT CHANGE UP (ref 80–100)
MCV RBC AUTO: 87.8 FL — SIGNIFICANT CHANGE UP (ref 80–100)
MCV RBC AUTO: 91.4 FL — SIGNIFICANT CHANGE UP (ref 80–100)
MRSA PCR RESULT.: NEGATIVE — SIGNIFICANT CHANGE UP
NRBC # BLD: 0 /100 WBCS — SIGNIFICANT CHANGE UP (ref 0–0)
OSMOLALITY SERPL: 321 MOSM/KG — HIGH (ref 280–301)
OSMOLALITY UR: 253 MOSM/KG — LOW (ref 300–900)
PCO2 BLDA: 29 MMHG — LOW (ref 35–48)
PCO2 BLDV: 33 MMHG — LOW (ref 41–51)
PH BLDA: 7.38 — SIGNIFICANT CHANGE UP (ref 7.35–7.45)
PH BLDV: 7.38 — SIGNIFICANT CHANGE UP (ref 7.32–7.43)
PHOSPHATE SERPL-MCNC: 5.7 MG/DL — HIGH (ref 2.5–4.5)
PHOSPHATE SERPL-MCNC: 6.3 MG/DL — HIGH (ref 2.5–4.5)
PHOSPHATE SERPL-MCNC: 6.6 MG/DL — HIGH (ref 2.5–4.5)
PLATELET # BLD AUTO: 42 K/UL — LOW (ref 150–400)
PLATELET # BLD AUTO: 43 K/UL — LOW (ref 150–400)
PLATELET # BLD AUTO: 54 K/UL — LOW (ref 150–400)
PLATELET # BLD AUTO: 57 K/UL — LOW (ref 150–400)
PO2 BLDA: 202 MMHG — HIGH (ref 83–108)
PO2 BLDV: 49 MMHG — SIGNIFICANT CHANGE UP
POTASSIUM BLDV-SCNC: 4.5 MMOL/L — SIGNIFICANT CHANGE UP (ref 3.5–4.9)
POTASSIUM SERPL-MCNC: 4.5 MMOL/L — SIGNIFICANT CHANGE UP (ref 3.5–5.3)
POTASSIUM SERPL-MCNC: 4.9 MMOL/L — SIGNIFICANT CHANGE UP (ref 3.5–5.3)
POTASSIUM SERPL-MCNC: 5.4 MMOL/L — HIGH (ref 3.5–5.3)
POTASSIUM SERPL-MCNC: 6.1 MMOL/L — HIGH (ref 3.5–5.3)
POTASSIUM SERPL-MCNC: 6.5 MMOL/L — CRITICAL HIGH (ref 3.5–5.3)
POTASSIUM SERPL-SCNC: 4.5 MMOL/L — SIGNIFICANT CHANGE UP (ref 3.5–5.3)
POTASSIUM SERPL-SCNC: 4.9 MMOL/L — SIGNIFICANT CHANGE UP (ref 3.5–5.3)
POTASSIUM SERPL-SCNC: 5.4 MMOL/L — HIGH (ref 3.5–5.3)
POTASSIUM SERPL-SCNC: 6.1 MMOL/L — HIGH (ref 3.5–5.3)
POTASSIUM SERPL-SCNC: 6.5 MMOL/L — CRITICAL HIGH (ref 3.5–5.3)
PROT SERPL-MCNC: 4.4 G/DL — LOW (ref 6–8.3)
PROT SERPL-MCNC: 4.5 G/DL — LOW (ref 6–8.3)
PROT SERPL-MCNC: 4.8 G/DL — LOW (ref 6–8.3)
PROT SERPL-MCNC: 5.1 G/DL — LOW (ref 6–8.3)
PROTHROM AB SERPL-ACNC: 20.5 SEC — HIGH (ref 10.6–13.6)
PROTHROM AB SERPL-ACNC: 22 SEC — HIGH (ref 10.6–13.6)
PROTHROM AB SERPL-ACNC: 27.5 SEC — HIGH (ref 10.6–13.6)
RBC # BLD: 2.54 M/UL — LOW (ref 4.2–5.8)
RBC # BLD: 2.63 M/UL — LOW (ref 4.2–5.8)
RBC # BLD: 2.85 M/UL — LOW (ref 4.2–5.8)
RBC # BLD: 2.9 M/UL — LOW (ref 4.2–5.8)
RBC # FLD: 13.7 % — SIGNIFICANT CHANGE UP (ref 10.3–14.5)
RBC # FLD: 13.7 % — SIGNIFICANT CHANGE UP (ref 10.3–14.5)
RBC # FLD: 14.4 % — SIGNIFICANT CHANGE UP (ref 10.3–14.5)
RBC # FLD: 14.5 % — SIGNIFICANT CHANGE UP (ref 10.3–14.5)
S AUREUS DNA NOSE QL NAA+PROBE: NEGATIVE — SIGNIFICANT CHANGE UP
SAO2 % BLDA: 99 % — SIGNIFICANT CHANGE UP (ref 95–100)
SAO2 % BLDV: 80 % — SIGNIFICANT CHANGE UP
SODIUM SERPL-SCNC: 137 MMOL/L — SIGNIFICANT CHANGE UP (ref 135–145)
SODIUM SERPL-SCNC: 138 MMOL/L — SIGNIFICANT CHANGE UP (ref 135–145)
SODIUM SERPL-SCNC: 139 MMOL/L — SIGNIFICANT CHANGE UP (ref 135–145)
SODIUM SERPL-SCNC: 139 MMOL/L — SIGNIFICANT CHANGE UP (ref 135–145)
SODIUM SERPL-SCNC: 143 MMOL/L — SIGNIFICANT CHANGE UP (ref 135–145)
SODIUM UR-SCNC: 57 MMOL/L — SIGNIFICANT CHANGE UP
TIBC SERPL-MCNC: 195 UG/DL — LOW (ref 220–430)
TROPONIN T SERPL-MCNC: 0.16 NG/ML — CRITICAL HIGH (ref 0–0.01)
TROPONIN T SERPL-MCNC: 0.19 NG/ML — CRITICAL HIGH (ref 0–0.01)
TSH SERPL-MCNC: 0.86 UIU/ML — SIGNIFICANT CHANGE UP (ref 0.35–4.94)
UIBC SERPL-MCNC: 99 UG/DL — LOW (ref 110–370)
UUN UR-MCNC: 141 MG/DL — SIGNIFICANT CHANGE UP
VIT B12 SERPL-MCNC: >2000 PG/ML — HIGH (ref 232–1245)
WBC # BLD: 7.93 K/UL — SIGNIFICANT CHANGE UP (ref 3.8–10.5)
WBC # BLD: 7.96 K/UL — SIGNIFICANT CHANGE UP (ref 3.8–10.5)
WBC # BLD: 8.17 K/UL — SIGNIFICANT CHANGE UP (ref 3.8–10.5)
WBC # BLD: 9.04 K/UL — SIGNIFICANT CHANGE UP (ref 3.8–10.5)
WBC # FLD AUTO: 7.93 K/UL — SIGNIFICANT CHANGE UP (ref 3.8–10.5)
WBC # FLD AUTO: 7.96 K/UL — SIGNIFICANT CHANGE UP (ref 3.8–10.5)
WBC # FLD AUTO: 8.17 K/UL — SIGNIFICANT CHANGE UP (ref 3.8–10.5)
WBC # FLD AUTO: 9.04 K/UL — SIGNIFICANT CHANGE UP (ref 3.8–10.5)

## 2021-01-01 PROCEDURE — 76775 US EXAM ABDO BACK WALL LIM: CPT | Mod: 26,59

## 2021-01-01 PROCEDURE — 74018 RADEX ABDOMEN 1 VIEW: CPT | Mod: 26

## 2021-01-01 PROCEDURE — 93010 ELECTROCARDIOGRAM REPORT: CPT

## 2021-01-01 PROCEDURE — 93975 VASCULAR STUDY: CPT | Mod: 26

## 2021-01-01 PROCEDURE — 99223 1ST HOSP IP/OBS HIGH 75: CPT

## 2021-01-01 PROCEDURE — 99291 CRITICAL CARE FIRST HOUR: CPT

## 2021-01-01 RX ORDER — THIAMINE MONONITRATE (VIT B1) 100 MG
500 TABLET ORAL EVERY 8 HOURS
Refills: 0 | Status: COMPLETED | OUTPATIENT
Start: 2021-01-01 | End: 2021-01-03

## 2021-01-01 RX ORDER — GLUCAGON INJECTION, SOLUTION 0.5 MG/.1ML
1 INJECTION, SOLUTION SUBCUTANEOUS ONCE
Refills: 0 | Status: DISCONTINUED | OUTPATIENT
Start: 2021-01-01 | End: 2021-01-15

## 2021-01-01 RX ORDER — DEXTROSE 50 % IN WATER 50 %
15 SYRINGE (ML) INTRAVENOUS ONCE
Refills: 0 | Status: DISCONTINUED | OUTPATIENT
Start: 2021-01-01 | End: 2021-01-15

## 2021-01-01 RX ORDER — DEXTROSE 50 % IN WATER 50 %
25 SYRINGE (ML) INTRAVENOUS ONCE
Refills: 0 | Status: DISCONTINUED | OUTPATIENT
Start: 2021-01-01 | End: 2021-01-15

## 2021-01-01 RX ORDER — DEXTROSE 50 % IN WATER 50 %
50 SYRINGE (ML) INTRAVENOUS ONCE
Refills: 0 | Status: COMPLETED | OUTPATIENT
Start: 2021-01-01 | End: 2021-01-01

## 2021-01-01 RX ORDER — INSULIN HUMAN 100 [IU]/ML
10 INJECTION, SOLUTION SUBCUTANEOUS ONCE
Refills: 0 | Status: COMPLETED | OUTPATIENT
Start: 2021-01-01 | End: 2021-01-01

## 2021-01-01 RX ORDER — CALCIUM GLUCONATE 100 MG/ML
2 VIAL (ML) INTRAVENOUS ONCE
Refills: 0 | Status: COMPLETED | OUTPATIENT
Start: 2021-01-01 | End: 2021-01-01

## 2021-01-01 RX ORDER — SODIUM BICARBONATE 1 MEQ/ML
0.31 SYRINGE (ML) INTRAVENOUS
Qty: 150 | Refills: 0 | Status: DISCONTINUED | OUTPATIENT
Start: 2021-01-01 | End: 2021-01-01

## 2021-01-01 RX ORDER — SODIUM BICARBONATE 1 MEQ/ML
0.19 SYRINGE (ML) INTRAVENOUS
Qty: 150 | Refills: 0 | Status: DISCONTINUED | OUTPATIENT
Start: 2021-01-01 | End: 2021-01-01

## 2021-01-01 RX ORDER — INSULIN LISPRO 100/ML
VIAL (ML) SUBCUTANEOUS EVERY 6 HOURS
Refills: 0 | Status: DISCONTINUED | OUTPATIENT
Start: 2021-01-01 | End: 2021-01-15

## 2021-01-01 RX ORDER — THIAMINE MONONITRATE (VIT B1) 100 MG
500 TABLET ORAL EVERY 8 HOURS
Refills: 0 | Status: DISCONTINUED | OUTPATIENT
Start: 2021-01-01 | End: 2021-01-01

## 2021-01-01 RX ORDER — SODIUM ZIRCONIUM CYCLOSILICATE 10 G/10G
10 POWDER, FOR SUSPENSION ORAL EVERY 8 HOURS
Refills: 0 | Status: COMPLETED | OUTPATIENT
Start: 2021-01-01 | End: 2021-01-01

## 2021-01-01 RX ORDER — SODIUM CHLORIDE 9 MG/ML
1000 INJECTION, SOLUTION INTRAVENOUS
Refills: 0 | Status: DISCONTINUED | OUTPATIENT
Start: 2021-01-01 | End: 2021-01-15

## 2021-01-01 RX ORDER — PANTOPRAZOLE SODIUM 20 MG/1
40 TABLET, DELAYED RELEASE ORAL EVERY 12 HOURS
Refills: 0 | Status: DISCONTINUED | OUTPATIENT
Start: 2021-01-01 | End: 2021-01-05

## 2021-01-01 RX ORDER — PHYTONADIONE (VIT K1) 5 MG
10 TABLET ORAL ONCE
Refills: 0 | Status: DISCONTINUED | OUTPATIENT
Start: 2021-01-01 | End: 2021-01-01

## 2021-01-01 RX ORDER — DEXTROSE 50 % IN WATER 50 %
12.5 SYRINGE (ML) INTRAVENOUS ONCE
Refills: 0 | Status: DISCONTINUED | OUTPATIENT
Start: 2021-01-01 | End: 2021-01-15

## 2021-01-01 RX ORDER — PHYTONADIONE (VIT K1) 5 MG
10 TABLET ORAL DAILY
Refills: 0 | Status: DISCONTINUED | OUTPATIENT
Start: 2021-01-01 | End: 2021-01-02

## 2021-01-01 RX ORDER — SODIUM ZIRCONIUM CYCLOSILICATE 10 G/10G
10 POWDER, FOR SUSPENSION ORAL ONCE
Refills: 0 | Status: COMPLETED | OUTPATIENT
Start: 2021-01-01 | End: 2021-01-01

## 2021-01-01 RX ORDER — AMPICILLIN SODIUM AND SULBACTAM SODIUM 250; 125 MG/ML; MG/ML
3 INJECTION, POWDER, FOR SUSPENSION INTRAMUSCULAR; INTRAVENOUS EVERY 12 HOURS
Refills: 0 | Status: DISCONTINUED | OUTPATIENT
Start: 2021-01-01 | End: 2021-01-01

## 2021-01-01 RX ORDER — DEXTROSE 50 % IN WATER 50 %
50 SYRINGE (ML) INTRAVENOUS ONCE
Refills: 0 | Status: DISCONTINUED | OUTPATIENT
Start: 2021-01-01 | End: 2021-01-01

## 2021-01-01 RX ORDER — INSULIN HUMAN 100 [IU]/ML
8 INJECTION, SOLUTION SUBCUTANEOUS ONCE
Refills: 0 | Status: COMPLETED | OUTPATIENT
Start: 2021-01-01 | End: 2021-01-01

## 2021-01-01 RX ORDER — INSULIN LISPRO 100/ML
VIAL (ML) SUBCUTANEOUS AT BEDTIME
Refills: 0 | Status: DISCONTINUED | OUTPATIENT
Start: 2021-01-01 | End: 2021-01-05

## 2021-01-01 RX ORDER — FOLIC ACID 0.8 MG
1 TABLET ORAL DAILY
Refills: 0 | Status: DISCONTINUED | OUTPATIENT
Start: 2021-01-01 | End: 2021-01-08

## 2021-01-01 RX ORDER — INSULIN HUMAN 100 [IU]/ML
4 INJECTION, SOLUTION SUBCUTANEOUS
Qty: 100 | Refills: 0 | Status: DISCONTINUED | OUTPATIENT
Start: 2021-01-01 | End: 2021-01-01

## 2021-01-01 RX ORDER — SODIUM CHLORIDE 9 MG/ML
1000 INJECTION INTRAMUSCULAR; INTRAVENOUS; SUBCUTANEOUS ONCE
Refills: 0 | Status: COMPLETED | OUTPATIENT
Start: 2021-01-01 | End: 2021-01-01

## 2021-01-01 RX ORDER — PIPERACILLIN AND TAZOBACTAM 4; .5 G/20ML; G/20ML
2.25 INJECTION, POWDER, LYOPHILIZED, FOR SOLUTION INTRAVENOUS EVERY 6 HOURS
Refills: 0 | Status: COMPLETED | OUTPATIENT
Start: 2021-01-01 | End: 2021-01-08

## 2021-01-01 RX ORDER — PIPERACILLIN AND TAZOBACTAM 4; .5 G/20ML; G/20ML
4.5 INJECTION, POWDER, LYOPHILIZED, FOR SOLUTION INTRAVENOUS EVERY 8 HOURS
Refills: 0 | Status: DISCONTINUED | OUTPATIENT
Start: 2021-01-01 | End: 2021-01-01

## 2021-01-01 RX ORDER — NOREPINEPHRINE BITARTRATE/D5W 8 MG/250ML
0.05 PLASTIC BAG, INJECTION (ML) INTRAVENOUS
Qty: 8 | Refills: 0 | Status: DISCONTINUED | OUTPATIENT
Start: 2021-01-01 | End: 2021-01-02

## 2021-01-01 RX ORDER — INSULIN HUMAN 100 [IU]/ML
10 INJECTION, SOLUTION SUBCUTANEOUS ONCE
Refills: 0 | Status: DISCONTINUED | OUTPATIENT
Start: 2021-01-01 | End: 2021-01-01

## 2021-01-01 RX ORDER — MAGNESIUM SULFATE 500 MG/ML
2 VIAL (ML) INJECTION ONCE
Refills: 0 | Status: COMPLETED | OUTPATIENT
Start: 2021-01-01 | End: 2021-01-01

## 2021-01-01 RX ORDER — PHYTONADIONE (VIT K1) 5 MG
5 TABLET ORAL ONCE
Refills: 0 | Status: COMPLETED | OUTPATIENT
Start: 2021-01-01 | End: 2021-01-01

## 2021-01-01 RX ADMIN — SODIUM CHLORIDE 1000 MILLILITER(S): 9 INJECTION INTRAMUSCULAR; INTRAVENOUS; SUBCUTANEOUS at 11:00

## 2021-01-01 RX ADMIN — PANTOPRAZOLE SODIUM 40 MILLIGRAM(S): 20 TABLET, DELAYED RELEASE ORAL at 11:01

## 2021-01-01 RX ADMIN — INSULIN HUMAN 8 UNIT(S): 100 INJECTION, SOLUTION SUBCUTANEOUS at 11:45

## 2021-01-01 RX ADMIN — Medication 50 MILLILITER(S): at 19:45

## 2021-01-01 RX ADMIN — VASOPRESSIN 2.4 UNIT(S)/MIN: 20 INJECTION INTRAVENOUS at 08:17

## 2021-01-01 RX ADMIN — Medication 200 GRAM(S): at 06:38

## 2021-01-01 RX ADMIN — INSULIN HUMAN 10 UNIT(S): 100 INJECTION, SOLUTION SUBCUTANEOUS at 00:58

## 2021-01-01 RX ADMIN — PIPERACILLIN AND TAZOBACTAM 200 GRAM(S): 4; .5 INJECTION, POWDER, LYOPHILIZED, FOR SOLUTION INTRAVENOUS at 19:07

## 2021-01-01 RX ADMIN — SODIUM ZIRCONIUM CYCLOSILICATE 10 GRAM(S): 10 POWDER, FOR SUSPENSION ORAL at 07:06

## 2021-01-01 RX ADMIN — Medication 105 MILLIGRAM(S): at 23:10

## 2021-01-01 RX ADMIN — PROPOFOL 1.8 MICROGRAM(S)/KG/MIN: 10 INJECTION, EMULSION INTRAVENOUS at 13:21

## 2021-01-01 RX ADMIN — CHLORHEXIDINE GLUCONATE 15 MILLILITER(S): 213 SOLUTION TOPICAL at 19:07

## 2021-01-01 RX ADMIN — SODIUM ZIRCONIUM CYCLOSILICATE 10 GRAM(S): 10 POWDER, FOR SUSPENSION ORAL at 13:19

## 2021-01-01 RX ADMIN — SODIUM ZIRCONIUM CYCLOSILICATE 10 GRAM(S): 10 POWDER, FOR SUSPENSION ORAL at 01:19

## 2021-01-01 RX ADMIN — Medication 200 GRAM(S): at 00:57

## 2021-01-01 RX ADMIN — PANTOPRAZOLE SODIUM 40 MILLIGRAM(S): 20 TABLET, DELAYED RELEASE ORAL at 23:09

## 2021-01-01 RX ADMIN — PIPERACILLIN AND TAZOBACTAM 200 GRAM(S): 4; .5 INJECTION, POWDER, LYOPHILIZED, FOR SOLUTION INTRAVENOUS at 13:20

## 2021-01-01 RX ADMIN — PROPOFOL 1.8 MICROGRAM(S)/KG/MIN: 10 INJECTION, EMULSION INTRAVENOUS at 08:17

## 2021-01-01 RX ADMIN — CHLORHEXIDINE GLUCONATE 15 MILLILITER(S): 213 SOLUTION TOPICAL at 01:19

## 2021-01-01 RX ADMIN — INSULIN HUMAN 4 UNIT(S)/HR: 100 INJECTION, SOLUTION SUBCUTANEOUS at 11:46

## 2021-01-01 RX ADMIN — PROPOFOL 1.8 MICROGRAM(S)/KG/MIN: 10 INJECTION, EMULSION INTRAVENOUS at 23:07

## 2021-01-01 RX ADMIN — OCTREOTIDE ACETATE 10 MICROGRAM(S)/HR: 200 INJECTION, SOLUTION INTRAVENOUS; SUBCUTANEOUS at 06:38

## 2021-01-01 RX ADMIN — Medication 1 MILLIGRAM(S): at 11:03

## 2021-01-01 RX ADMIN — PANTOPRAZOLE SODIUM 10 MG/HR: 20 TABLET, DELAYED RELEASE ORAL at 00:36

## 2021-01-01 RX ADMIN — Medication 50 GRAM(S): at 20:20

## 2021-01-01 RX ADMIN — Medication 50 MILLILITER(S): at 00:58

## 2021-01-01 RX ADMIN — Medication 105 MILLIGRAM(S): at 14:00

## 2021-01-01 RX ADMIN — Medication 105 MILLIGRAM(S): at 06:20

## 2021-01-01 RX ADMIN — Medication 125 MEQ/KG/HR: at 05:16

## 2021-01-01 RX ADMIN — Medication 50 MILLILITER(S): at 06:50

## 2021-01-01 RX ADMIN — Medication 64.38 GRAM(S): at 11:27

## 2021-01-01 RX ADMIN — Medication 101 MILLIGRAM(S): at 01:51

## 2021-01-01 RX ADMIN — Medication 101 MILLIGRAM(S): at 00:35

## 2021-01-01 RX ADMIN — Medication 128.75 GRAM(S): at 06:23

## 2021-01-01 RX ADMIN — Medication 295 GRAM(S): at 05:59

## 2021-01-01 RX ADMIN — INSULIN HUMAN 10 UNIT(S): 100 INJECTION, SOLUTION SUBCUTANEOUS at 06:49

## 2021-01-01 NOTE — PROGRESS NOTE ADULT - SUBJECTIVE AND OBJECTIVE BOX
SUBJECTIVE: The patient is intubated and sedated. Yesterday the patient required transfusion of 3 units of pRBCs, 2 units of plasma and 1 unit cryoprecipitate. Other than that there were no acute events. The patient was examined this morning      Vital Signs Last 24 Hrs  T(C): 35.9 (01 Jan 2021 10:00), Max: 37.9 (01 Jan 2021 03:00)  T(F): 96.6 (01 Jan 2021 10:00), Max: 100.3 (01 Jan 2021 03:00)  HR: 87 (01 Jan 2021 10:00) (66 - 116)  BP: 117/58 (01 Jan 2021 09:00) (60/32 - 169/76)  BP(mean): 83 (01 Jan 2021 09:00) (68 - 123)  RR: 18 (01 Jan 2021 10:00) (10 - 31)  SpO2: 100% (01 Jan 2021 10:00) (99% - 100%)    Physical Exam:  General: NAD, resting in the bed  Pulmonary: intubated and sedated  Abdominal: soft, ND  Extremities: WWP, normal strength, no clubbing/cyanosis/edema    Lines/drains/tubes:    I&O's Summary    31 Dec 2020 07:01  -  01 Jan 2021 07:00  --------------------------------------------------------  IN: 3573.2 mL / OUT: 480 mL / NET: 3093.2 mL    01 Jan 2021 07:01  -  01 Jan 2021 10:50  --------------------------------------------------------  IN: 387.2 mL / OUT: 5 mL / NET: 382.2 mL        LABS:                        7.8    7.93  )-----------( 54       ( 01 Jan 2021 04:10 )             22.3     01-01    137  |  96  |  46<H>  ----------------------------<  414<H>  5.4<H>   |  17<L>  |  2.55<H>    Ca    7.3<L>      01 Jan 2021 10:02  Phos  6.6     01-01  Mg     1.7     01-01    TPro  4.8<L>  /  Alb  2.7<L>  /  TBili  2.8<H>  /  DBili  x   /  AST  >7000  /  ALT  4181<H>  /  AlkPhos  69  01-01    PT/INR - ( 01 Jan 2021 04:10 )   PT: 20.5 sec;   INR: 1.75          PTT - ( 01 Jan 2021 04:10 )  PTT:32.7 sec  Urinalysis Basic - ( 31 Dec 2020 11:53 )    Color: Yellow / Appearance: Clear / SG: <=1.005 / pH: x  Gluc: x / Ketone: NEGATIVE  / Bili: NEGATIVE / Urobili: 0.2 E.U./dL   Blood: x / Protein: NEGATIVE mg/dL / Nitrite: NEGATIVE   Leuk Esterase: NEGATIVE / RBC: x / WBC x   Sq Epi: x / Non Sq Epi: x / Bacteria: x      CAPILLARY BLOOD GLUCOSE      POCT Blood Glucose.: 80 mg/dL (31 Dec 2020 18:08)  POCT Blood Glucose.: 321 mg/dL (31 Dec 2020 11:11)    LIVER FUNCTIONS - ( 01 Jan 2021 04:10 )  Alb: 2.7 g/dL / Pro: 4.8 g/dL / ALK PHOS: 69 U/L / ALT: 4181 U/L / AST: >7000 U/L / GGT: x             RADIOLOGY & ADDITIONAL STUDIES:     SUBJECTIVE: The patient is intubated and sedated. Yesterday the patient required transfusion of 3 units of pRBCs, 2 units of plasma and 1 unit cryoprecipitate. Other than that there were no acute events. The patient was examined this morning.      Vital Signs Last 24 Hrs  T(C): 35.9 (01 Jan 2021 10:00), Max: 37.9 (01 Jan 2021 03:00)  T(F): 96.6 (01 Jan 2021 10:00), Max: 100.3 (01 Jan 2021 03:00)  HR: 87 (01 Jan 2021 10:00) (66 - 116)  BP: 117/58 (01 Jan 2021 09:00) (60/32 - 169/76)  BP(mean): 83 (01 Jan 2021 09:00) (68 - 123)  RR: 18 (01 Jan 2021 10:00) (10 - 31)  SpO2: 100% (01 Jan 2021 10:00) (99% - 100%)    Physical Exam:  General: NAD, resting in the bed  Pulmonary: intubated and sedated  Abdominal: soft, ND  Extremities: WWP, normal strength, no clubbing/cyanosis/edema    Lines/drains/tubes:    I&O's Summary    31 Dec 2020 07:01  -  01 Jan 2021 07:00  --------------------------------------------------------  IN: 3573.2 mL / OUT: 480 mL / NET: 3093.2 mL    01 Jan 2021 07:01  -  01 Jan 2021 10:50  --------------------------------------------------------  IN: 387.2 mL / OUT: 5 mL / NET: 382.2 mL        LABS:                        7.8    7.93  )-----------( 54       ( 01 Jan 2021 04:10 )             22.3     01-01    137  |  96  |  46<H>  ----------------------------<  414<H>  5.4<H>   |  17<L>  |  2.55<H>    Ca    7.3<L>      01 Jan 2021 10:02  Phos  6.6     01-01  Mg     1.7     01-01    TPro  4.8<L>  /  Alb  2.7<L>  /  TBili  2.8<H>  /  DBili  x   /  AST  >7000  /  ALT  4181<H>  /  AlkPhos  69  01-01    PT/INR - ( 01 Jan 2021 04:10 )   PT: 20.5 sec;   INR: 1.75          PTT - ( 01 Jan 2021 04:10 )  PTT:32.7 sec  Urinalysis Basic - ( 31 Dec 2020 11:53 )    Color: Yellow / Appearance: Clear / SG: <=1.005 / pH: x  Gluc: x / Ketone: NEGATIVE  / Bili: NEGATIVE / Urobili: 0.2 E.U./dL   Blood: x / Protein: NEGATIVE mg/dL / Nitrite: NEGATIVE   Leuk Esterase: NEGATIVE / RBC: x / WBC x   Sq Epi: x / Non Sq Epi: x / Bacteria: x      CAPILLARY BLOOD GLUCOSE      POCT Blood Glucose.: 80 mg/dL (31 Dec 2020 18:08)  POCT Blood Glucose.: 321 mg/dL (31 Dec 2020 11:11)    LIVER FUNCTIONS - ( 01 Jan 2021 04:10 )  Alb: 2.7 g/dL / Pro: 4.8 g/dL / ALK PHOS: 69 U/L / ALT: 4181 U/L / AST: >7000 U/L / GGT: x             RADIOLOGY & ADDITIONAL STUDIES:

## 2021-01-01 NOTE — CONSULT NOTE ADULT - ATTENDING COMMENTS
Marcial Harrison MD  Orthopaedic Surgery    [address] [place] [address] [address]  [place] [address]  Office: [phone]
Prerenal ANTONELLA less likely given poor response to IVF challenge today and elevated urinary Na, oligoanuric, advise placing hemodialysis temporary catheter and initiation of CVHD. Can keep UF goal hourly net even if BP tolerates

## 2021-01-01 NOTE — CONSULT NOTE ADULT - SUBJECTIVE AND OBJECTIVE BOX
Orthopaedic Surgery Consult Note    For Surgeon: Dr. Harrison    HPI:  86 y/o man with no reported PMHx possibly Afib? ( states not on any medications) who presented initially to [facility] ED after EMS found patient down on the street hypothermic to 77F and with altered mental status. Unfortunately pt is a poor historian was not able to verbalize any symptoms or sequence of events due to his critical condition. Based off the ED provier note, pt arrived to the ED alert , interactive but mumbling.     In the ED VS T: 86 F Rectally HR: 66 BP: 86/45 RR: ? Saturating 100% on RA . Labs remarkable for HGb 8.2, Plt 70, K 6.7, Mg 3.2, bicarb 12, lactate 15, anion gap 12, Cr 2.93, glucose 498. AST//324 VBG: pH 6.98 pcO2 30, po2 16, o2 sat 11.  CXR demonstrating severe dextro scoliosis. CT head demonstrating no acute intracranial pathology and frontal soft tissue hematoma. Pt received IV CTZ 1g x 1, Sodium Bicarb amp x 1 and started on infusion, calcium gluconate/dextrose/insulin, Zyprexa, and started on levophed and pt  was accepted to MICU for hypothermia and shock.     Upon arrival to the Medical ICU, pt was on NC but in respiratory distress using accessory muscles and belly breathing. Pt was mentating well but tachypneic to 35-40s. Pt was initially placed on BiPAP to decrease work of breathing but decision was made to intubate the patient for respiratory distress. Repeat labs on arrival demonstrated worsening anemia to 5.9, worsening thrombocytopenia, even lower bicarb, and coagulopathy. Central line and arterial line was placed. Sump inserted during time on intubation was placed to suction with coffee ground / dark brown output. Pt received 2 units of pRBC and remained on levophed. Pt  now sedated , intubated, and admitted to MICU for further evaluation and management of hypothermia, shock, and r/o gastrointestinal hemorrhage.  (31 Dec 2020 19:53)    Ortho consulted for incidental finding of R inferior pubic ramus fx on CT A/P    Allergies    No Known Allergies    Intolerances      PAST MEDICAL & SURGICAL HISTORY:  Medical history unknown    No significant past surgical history      MEDICATIONS  (STANDING):  acetylcysteine IVPB 9 Gram(s) IV Intermittent once  acetylcysteine IVPB 3 Gram(s) IV Intermittent once  acetylcysteine IVPB 6 Gram(s) IV Intermittent once  chlorhexidine 0.12% Liquid 15 milliLiter(s) Oral Mucosa every 12 hours  chlorhexidine 4% Liquid 1 Application(s) Topical <User Schedule>  folic acid Injectable 1 milliGRAM(s) IV Push daily  norepinephrine Infusion 0.05 MICROgram(s)/kG/Min (4.26 mL/Hr) IV Continuous <Continuous>  octreotide  Infusion 50 MICROgram(s)/Hr (10 mL/Hr) IV Continuous <Continuous>  pantoprazole Infusion 8 mG/Hr (10 mL/Hr) IV Continuous <Continuous>  phytonadione  IVPB 10 milliGRAM(s) IV Intermittent daily  propofol Infusion 5 MICROgram(s)/kG/Min (1.8 mL/Hr) IV Continuous <Continuous>  sodium bicarbonate  Infusion 0.313 mEq/kG/Hr (125 mL/Hr) IV Continuous <Continuous>  thiamine Injectable 100 milliGRAM(s) IV Push daily  thiamine IVPB 500 milliGRAM(s) IV Intermittent every 8 hours  vasopressin Infusion 0.04 Unit(s)/Min (2.4 mL/Hr) IV Continuous <Continuous>    MEDICATIONS  (PRN):      Vital Signs Last 24 Hrs  T(C): 37.5 (01 Jan 2021 02:00), Max: 37.5 (01 Jan 2021 02:00)  T(F): 99.5 (01 Jan 2021 02:00), Max: 99.5 (01 Jan 2021 02:00)  HR: 100 (01 Jan 2021 02:00) (66 - 116)  BP: 131/62 (01 Jan 2021 02:00) (60/32 - 169/76)  BP(mean): 89 (01 Jan 2021 02:00) (68 - 123)  RR: 25 (01 Jan 2021 02:00) (19 - 31)  SpO2: 100% (01 Jan 2021 02:00) (99% - 100%)    Physical Exam:  General: Intubated and sedated in ICU  Unable to perform exam 2/2 sedation/mentation  No gross deformities noted  Skin intact                        8.8    7.96  )-----------( 57       ( 31 Dec 2020 23:46 )             26.5     12-31    143  |  104  |  35<H>  ----------------------------<  162<H>  6.5<HH>   |  11<L>  |  2.26<H>    Ca    7.0<L>      31 Dec 2020 23:46  Phos  9.3     12-31  Mg     1.8     12-31    TPro  4.5<L>  /  Alb  2.5<L>  /  TBili  2.3<H>  /  DBili  x   /  AST  >7000<H>  /  ALT  4515<H>  /  AlkPhos  63  12-31    PT/INR - ( 31 Dec 2020 23:46 )   PT: 27.5 sec;   INR: 2.39          PTT - ( 31 Dec 2020 23:46 )  PTT:44.3 sec  Imaging: CT showing minimally displaced R inferior public ramus fx    A/P: 85yMale with R inferior pubic ramus fx  - No acute ortho intervention  - Pt can be WBAT  - Pain control  - Physical therapy  - Rest of mgmt as per primary  -Discussed with Dr. Harrison    Ortho Pager [phone]

## 2021-01-01 NOTE — PROGRESS NOTE ADULT - ASSESSMENT
85M with no reported PMHx (not on any medications) who presented initially to [facility] ED after EMS found patient on the street hypothermic to 77F, altered mentation. GI consulted for coffee grounds in suction canister.     #Coffee grounds  - unclear etiology at this time: patient not having emesis, abdominal pain, or melena/hematochezia on initial presentation or even celsa-intubation  - most likely had some mucosal bleeding given overall clinical picture; Hgb now stable  - he does have significant liver test abnormalities, most likely in the setting of shock liver  - Maintain active T&S, large bore IV access  - Transfusion threshold per primary team  - PPI BID   - NPO  - CTA results not suggestive of active GIB  - consider vitamin K 10 mg IV daily x3 days to reverse INR, given low albumin, most likely EtOH abuse, and undomiciled status argue for poor nutritional status    Thank you for allowing us to participate in the care of this patient.  GI will sign off. Please call back with any questions or concerns.     Armond George MD  PGY-4, Gastroenterology Fellow  pager: [phone]

## 2021-01-01 NOTE — PROGRESS NOTE ADULT - SUBJECTIVE AND OBJECTIVE BOX
GASTROENTEROLOGY PROGRESS NOTE  Patient seen and examined at bedside. NGT w/ minimal further output. Per RN at bedside, patient having brown stool. Decreased pressor requirements.     PERTINENT REVIEW OF SYSTEMS:  Unable to obtain as patient intubated and sedated.      Allergies    No Known Allergies    Intolerances      MEDICATIONS:  MEDICATIONS  (STANDING):  chlorhexidine 0.12% Liquid 15 milliLiter(s) Oral Mucosa every 12 hours  chlorhexidine 4% Liquid 1 Application(s) Topical <User Schedule>  dextrose 40% Gel 15 Gram(s) Oral once  dextrose 5%. 1000 milliLiter(s) (100 mL/Hr) IV Continuous <Continuous>  dextrose 5%. 1000 milliLiter(s) (50 mL/Hr) IV Continuous <Continuous>  dextrose 50% Injectable 25 Gram(s) IV Push once  dextrose 50% Injectable 12.5 Gram(s) IV Push once  dextrose 50% Injectable 25 Gram(s) IV Push once  folic acid Injectable 1 milliGRAM(s) IV Push daily  glucagon  Injectable 1 milliGRAM(s) IntraMuscular once  insulin lispro (ADMELOG) corrective regimen sliding scale   SubCutaneous three times a day before meals  insulin lispro (ADMELOG) corrective regimen sliding scale   SubCutaneous at bedtime  insulin regular Infusion 4 Unit(s)/Hr (4 mL/Hr) IV Continuous <Continuous>  norepinephrine Infusion 0.05 MICROgram(s)/kG/Min (4.26 mL/Hr) IV Continuous <Continuous>  pantoprazole  Injectable 40 milliGRAM(s) IV Push every 12 hours  phytonadione  IVPB 10 milliGRAM(s) IV Intermittent daily  piperacillin/tazobactam IVPB.. 2.25 Gram(s) IV Intermittent every 6 hours  propofol Infusion 5 MICROgram(s)/kG/Min (1.8 mL/Hr) IV Continuous <Continuous>  sodium zirconium cyclosilicate 10 Gram(s) Oral every 8 hours  thiamine IVPB 500 milliGRAM(s) IV Intermittent every 8 hours  vasopressin Infusion 0.04 Unit(s)/Min (2.4 mL/Hr) IV Continuous <Continuous>    MEDICATIONS  (PRN):    Vital Signs Last 24 Hrs  T(C): 36.1 (01 Jan 2021 14:00), Max: 37.9 (01 Jan 2021 03:00)  T(F): 97 (01 Jan 2021 14:00), Max: 100.3 (01 Jan 2021 03:00)  HR: 105 (01 Jan 2021 15:00) (85 - 105)  BP: 117/58 (01 Jan 2021 09:00) (92/53 - 169/76)  BP(mean): 83 (01 Jan 2021 09:00) (68 - 123)  RR: 18 (01 Jan 2021 15:00) (0 - 31)  SpO2: 100% (01 Jan 2021 15:00) (98% - 100%)    12-31 @ 07:01 - 01-01 @ 07:00  --------------------------------------------------------  IN: 3573.2 mL / OUT: 480 mL / NET: 3093.2 mL    01-01 @ 07:01 - 01-01 @ 15:16  --------------------------------------------------------  IN: 2862.5 mL / OUT: 75 mL / NET: 2787.5 mL      PHYSICAL EXAM:    General: intubated and sedated  HEENT: MMM, conjunctiva and sclera clear  Gastrointestinal: Soft non-tender non-distended; Normal bowel sounds; No hepatosplenomegaly. No rebound or guarding  Skin: Warm and dry. No obvious rash    LABS:                        8.3    8.17  )-----------( 43       ( 01 Jan 2021 12:45 )             23.0     01-01    138  |  99  |  44<H>  ----------------------------<  303<H>  4.9   |  17<L>  |  2.73<H>    Ca    7.0<L>      01 Jan 2021 12:45  Phos  6.3     01-01  Mg     1.7     01-01    TPro  4.4<L>  /  Alb  2.4<L>  /  TBili  2.7<H>  /  DBili  x   /  AST  >7000  /  ALT  3362<H>  /  AlkPhos  63  01-01    PT/INR - ( 01 Jan 2021 12:45 )   PT: 22.0 sec;   INR: 1.89          PTT - ( 01 Jan 2021 12:45 )  PTT:34.3 sec      Urinalysis Basic - ( 31 Dec 2020 11:53 )    Color: Yellow / Appearance: Clear / SG: <=1.005 / pH: x  Gluc: x / Ketone: NEGATIVE  / Bili: NEGATIVE / Urobili: 0.2 E.U./dL   Blood: x / Protein: NEGATIVE mg/dL / Nitrite: NEGATIVE   Leuk Esterase: NEGATIVE / RBC: x / WBC x   Sq Epi: x / Non Sq Epi: x / Bacteria: x                RADIOLOGY & ADDITIONAL STUDIES:  Reviewed

## 2021-01-01 NOTE — PROGRESS NOTE ADULT - ASSESSMENT
84 yo M w/ unknown PMH BIBA to [facility] found hypothermic w/ AMS. Hypotensive on presentation w/ increasing pressor requirements. Hgb 5.8, Lactate 18, Abg w/ pH 7.19. Surgery consulted for concern for mesenteric ischemia given persistently elevated lactate and consistency of NGT output.     CTA performed reviewed w/ radiology resident, w/o radiologic evidence of mesenteric ischemia  No acute surgical intervention at this time  Team 4C will continue to follow  Plan discussed w/ attending and chief resident

## 2021-01-01 NOTE — PROGRESS NOTE ADULT - ASSESSMENT
86 y/o man with no reported PMHx possibly Afib? ( states not on any medications) who presented initially to [facility] ED after EMS found patient down on the street hypothermic to 77F and with altered mental status. Pt admitted to MICU for further evaluation and management of hypothermia, shock, and r/o ischemic bowel/ gastrointestinal hemorrhage.     NEURO  Patient intubated, sedated on propofol gtt    #AMS  Patient presented to [facility] with AMS. CTH noncontrast without acute bleed or fracture. Alcohol level elevated with hx of alcohol use. UTox negative  - pt initially with severe hypothermia, metabolic derangements, and shock likely contributing to altered mental status.   - currently intubated and sedated      CARDIOVASCULAR  #SHOCK  Pt found to be hypotensive to 80s/40s likely 2/2 to hemorrhagic/hypovolemic shock vs septic shock (unclear source) vs distributive (rewarming). Hemorrhagic source evidenced by Hgb drop from 8.2 to 5.9 with coffee grounds suctioned from sump placed. Pt with alerted mentation, low urine output, and cold to touch.   - currently on levophed and vasopressin  - will wean off vasopressin and transition only to levo with goal of weaning off all pressors   - volume rescutiation with blood products as well as intermittent fluid boluses  - UOP about 15-20cc/hr       #Troponemia  Troponin T 0.14->0.16  - trend troponin to peak  - f/u EKG    PULMONARY  #Respiratory Failure  Pt not hypoxic but with tachypnea, increased WOB with use of accessory muscles. Pt intubated due to concern for eventual respiratory failure and air way protection  - Currently on VC/AC Vent Settings:  50/450/14/5    GASTROINTESTINAL  #r/o Bowel Ischemia  Pt with Hb 5.8 upon MICU arrival, with coffee ground output from sump. In the setting of shock and low Hb, and afib noted at [facility],   -CTA A/P performed with no intestinal bleed or mesenteric ischemia  -Surgery consulted    #r/o Gastrointestinal Hemorrhage  See above  - c/w IV protonix gtt and octreotide gtt  - transition protonix ggt to IV Protonix Q12H  - d/c octreotide drip due to no evidence of portal HTN or cirrhosis on imagining   - GI consulted, f/u recs    #Acute liver failure  LFTs > 7000, with coagulopathy and Utox negative. Acetaminophen level negative. Alcohol level 26. Hx of alcohol use. Liver failure possibly 2/2 to shock liver   - c/w NAC protocol  - Vitamin K adminstered for coagulopathy  - monitor INR   - continue to trend LFTs    RENAL  #Anion Gap Metabolic Acidosis  Likely 2/2 lactic acidosis. AG of 28 on presentation with pH 7.19, pCO2 15, bicarb 6 on arrival to MICU. Initiated on bicarb gtt here and s/p 2 amps bicarb  - bicarb gtt turned off  - f/u Q4H BMP      #Acute Renal Failure  Pt with sCr 2.93 on presentation (unknown baseline), improved s/p fluid boluses in ED. However now trending back up to 2.26  - f/u urine lytes  - renal consulted    #Hyperkalemia   K+5.5 on arrival at MICU with repeat 6.5. In the setting of ANTONELLA  - K now downtrending  - initiated Lokelma 10mg Q8H   - Q4H bmp      INFECTIOUS DISEASE    #LLL consolidation vs atelectasis  - will emprically cover for Zosyn due to patient being in shock  - f/u MRSA swab    ENDOCRINE  #Hyperglycemia  Possibly in the setting of sepsis?   - insulin infusion following hyperglycemia protocol    HEME  #Normocytic Anemia   On presentation, patient with Hb 8.2, with repeat of 5.8 on MICU arrival. Now s/p 2u pRBC, with posttransfusion Hb 8.8    #Thrombocytopenia  Plt 70 on presentation, and repeat in the 50s. Likely 2/2 to renal failure   - holding platelet transfusion right now    #Elevated INR  In the setting of acute liver failure. Repeat INR 2.39  - IV vitamin K 10mg daily x 3 days per GI  - ordered for 2u FFP and 1u cryoprecipitate    MSK  #R pubic rim fracture  Noted on CTA abd/pelvis with adjacent hematoma without active signs of bleeding (per wet radiology read).   - Ortho consulted, appreciate nneka Madden in place    FLUIDS/ELECTROLYTES/NUTRITION  -IVF  -Monitor, Replete to K>4 and Mg>2  -Diet: NPO  PROPHYLAXIS  -DVT: hold in the setting of possible bleed  -GI ppx: protonix BID    DISPO: MICU  CODE STATUS: FULL

## 2021-01-01 NOTE — PROCEDURE NOTE - NSICDXPROCEDURE_GEN_ALL_CORE_FT
PROCEDURES:  Insertion, nasogastric or orogastric tube 01-Jan-2021 02:29:59  Iram Bloom  
PROCEDURES:  Tracheal intubation 31-Dec-2020 19:41:44  Zachery Black  
PROCEDURES:  Insertion of temporary dialysis catheter 01-Jan-2021 19:34:17  Zachery Black  Tracheal intubation 31-Dec-2020 19:41:44  Zachery Black  
PROCEDURES:  Insertion, arterial line, percutaneous 31-Dec-2020 20:57:09  Iram Bloom  Ultrasound guidance for vascular access 31-Dec-2020 20:56:56  Iram Bloom  
PROCEDURES:  US Doppler guided insertion of central venous catheter 31-Dec-2020 20:54:15  Iram Bloom  Ultrasound guided venous access 31-Dec-2020 20:53:11  Iram Bloom  Insertion, needle, vein 31-Dec-2020 20:52:50  Iram Bloom

## 2021-01-01 NOTE — CONSULT NOTE ADULT - ASSESSMENT
86 y/o M unknown pmh BIBA to [facility] found hypothermic w/ AMS. Hypotensive on presentation w/ increasing pressor requirements. Hgb 5.8, Lactate 18, Abg w/ pH 7.19. Surgery consulted for concern for mesenteric ischemia given persistently elevated lactate and consistency of NGT output.     CTA performed reviewed w/ radiology esident, w/o radiologic evidence of mesenteric ischemia  No acute surgical intervention at this time  Recommend Orthopedic surgery consult for R pelvic fracture  Team 4c will continue to follow  Plan discussed w/ attending and chief resident

## 2021-01-01 NOTE — PROGRESS NOTE ADULT - SUBJECTIVE AND OBJECTIVE BOX
Overnight: 2units pRBC given. added vasopressin for pressor support. on octreotide and protonix gtt. CTA chest: L lung atelectasis vs consolidation. CTAP: no intestinal bleed, +R inferior pubic rim fracture with adjacement hematoma (no active bleeding). Ordered for 2u FFP and 1u cryoprecipitate given INR>2. Ortho consulted for R pelvic fracture. Sodium bicarb gtt increased from 100cc to 125cc/hr for repeat bicarb 11 on BMP. For K+ 6.5, 2g IV calcium gluconate, insulin 10u/D50 amp, and lokelma 10g ordered, EKG without hyperkalemic changes. AM repeat K+ 6.1, s/p 2g IV calcium gluconate, insulin 10u/D50 amp, In the AM, ordered for another 1u pRBC, 2u FFP    Subjective: This morning patient was intubated and sedated. Sump in place with unchanged amount of coffee ground output. Unable to obtain ROS due to patients neurocognitive status.       INTERVAL HPI/OVERNIGHT EVENTS:  ICU Vital Signs Last 24 Hrs  T(C): 35.9 (01 Jan 2021 10:00), Max: 37.9 (01 Jan 2021 03:00)  T(F): 96.6 (01 Jan 2021 10:00), Max: 100.3 (01 Jan 2021 03:00)  HR: 87 (01 Jan 2021 11:20) (85 - 116)  BP: 117/58 (01 Jan 2021 09:00) (60/32 - 169/76)  BP(mean): 83 (01 Jan 2021 09:00) (68 - 123)  ABP: 109/54 (01 Jan 2021 10:00) (94/48 - 137/66)  ABP(mean): 74 (01 Jan 2021 10:00) (65 - 96)  RR: 17 (01 Jan 2021 11:20) (10 - 31)  SpO2: 100% (01 Jan 2021 11:20) (99% - 100%)    I&O's Summary    31 Dec 2020 07:01  -  01 Jan 2021 07:00  --------------------------------------------------------  IN: 3573.2 mL / OUT: 480 mL / NET: 3093.2 mL    01 Jan 2021 07:01  -  01 Jan 2021 12:34  --------------------------------------------------------  IN: 387.2 mL / OUT: 5 mL / NET: 382.2 mL      Mode: AC/ CMV (Assist Control/ Continuous Mandatory Ventilation)  RR (machine): 14  TV (machine): 450  FiO2: 40  PEEP: 5  ITime: 1  MAP: 8.2  PIP: 16      LABS:                        7.8    7.93  )-----------( 54       ( 01 Jan 2021 04:10 )             22.3     01-01    137  |  96  |  46<H>  ----------------------------<  414<H>  5.4<H>   |  17<L>  |  2.55<H>    Ca    7.3<L>      01 Jan 2021 10:02  Phos  6.6     01-01  Mg     1.7     01-01    TPro  4.8<L>  /  Alb  2.7<L>  /  TBili  2.8<H>  /  DBili  x   /  AST  >7000  /  ALT  4181<H>  /  AlkPhos  69  01-01    PT/INR - ( 01 Jan 2021 04:10 )   PT: 20.5 sec;   INR: 1.75          PTT - ( 01 Jan 2021 04:10 )  PTT:32.7 sec  Urinalysis Basic - ( 31 Dec 2020 11:53 )    Color: Yellow / Appearance: Clear / SG: <=1.005 / pH: x  Gluc: x / Ketone: NEGATIVE  / Bili: NEGATIVE / Urobili: 0.2 E.U./dL   Blood: x / Protein: NEGATIVE mg/dL / Nitrite: NEGATIVE   Leuk Esterase: NEGATIVE / RBC: x / WBC x   Sq Epi: x / Non Sq Epi: x / Bacteria: x      CAPILLARY BLOOD GLUCOSE      POCT Blood Glucose.: 342 mg/dL (01 Jan 2021 11:38)  POCT Blood Glucose.: 80 mg/dL (31 Dec 2020 18:08)    ABG - ( 01 Jan 2021 08:35 )  pH, Arterial: 7.38  pH, Blood: x     /  pCO2: 29    /  pO2: 202   / HCO3: 17    / Base Excess: -7.6  /  SaO2: 99                  RADIOLOGY & ADDITIONAL TESTS:    Consultant(s) Notes Reviewed:  [x ] YES  [ ] NO    MEDICATIONS  (STANDING):  chlorhexidine 0.12% Liquid 15 milliLiter(s) Oral Mucosa every 12 hours  chlorhexidine 4% Liquid 1 Application(s) Topical <User Schedule>  dextrose 40% Gel 15 Gram(s) Oral once  dextrose 5%. 1000 milliLiter(s) (50 mL/Hr) IV Continuous <Continuous>  dextrose 5%. 1000 milliLiter(s) (100 mL/Hr) IV Continuous <Continuous>  dextrose 50% Injectable 25 Gram(s) IV Push once  dextrose 50% Injectable 12.5 Gram(s) IV Push once  dextrose 50% Injectable 25 Gram(s) IV Push once  folic acid Injectable 1 milliGRAM(s) IV Push daily  glucagon  Injectable 1 milliGRAM(s) IntraMuscular once  insulin lispro (ADMELOG) corrective regimen sliding scale   SubCutaneous three times a day before meals  insulin lispro (ADMELOG) corrective regimen sliding scale   SubCutaneous at bedtime  insulin regular Infusion 4 Unit(s)/Hr (4 mL/Hr) IV Continuous <Continuous>  norepinephrine Infusion 0.05 MICROgram(s)/kG/Min (4.26 mL/Hr) IV Continuous <Continuous>  pantoprazole  Injectable 40 milliGRAM(s) IV Push every 12 hours  phytonadione  IVPB 10 milliGRAM(s) IV Intermittent daily  piperacillin/tazobactam IVPB.. 2.25 Gram(s) IV Intermittent every 6 hours  propofol Infusion 5 MICROgram(s)/kG/Min (1.8 mL/Hr) IV Continuous <Continuous>  sodium zirconium cyclosilicate 10 Gram(s) Oral every 8 hours  thiamine IVPB 500 milliGRAM(s) IV Intermittent every 8 hours  vasopressin Infusion 0.04 Unit(s)/Min (2.4 mL/Hr) IV Continuous <Continuous>    MEDICATIONS  (PRN):      PHYSICAL EXAM:  GENERAL: intubated, sedated, elderly  gentleman laying comfortably in bed  HEAD:  old healed lacerations on the top of scalp. Frontal soft tissue hematoma on forehead  EYES: EOMI, PERRLA, conjunctiva and sclera clear  ENT: airway in place, no mucous or blood around ET tube site  NECK: Supple, No JVD, Normal thyroid, no enlarged nodes  CHEST/LUNG: B/L good air entry; mechanical ventilatory sounds appreciated bilaterally  HEART: S1S2 normal, no S3, Regular rate and rhythm; No murmurs, rubs, or gallops  ABDOMEN: Soft, distended, Nontender,  Bowel sounds present  EXTREMITIES:  2+ Peripheral Pulses, No clubbing, cyanosis, or edema  SKIN: No rashes or lesions  Neuro: sedated, unable to participate in neuro exam.    Care Discussed with Consultants/Other Providers [ x] YES  [ ] NO

## 2021-01-01 NOTE — CONSULT NOTE ADULT - SUBJECTIVE AND OBJECTIVE BOX
Patient is a 85y old  Male who presents with a chief complaint of AMS, hypothermic, (01 Jan 2021 12:34)      HPI: 85M no reported PMHx who presented initially to [facility] ED after EMS found patient down on the street hypothermic to 77F and with altered mental status. Accepted to MICU for hypothermia and shock. Intubated upon arrival for respiratory distress. Repeat labs on arrival demonstrated worsening anemia to 5.9, worsening thrombocytopenia, severe acidosis, and coagulopathy. Central line and arterial line was placed. Sump inserted during time on intubation was placed to suction with coffee ground / dark brown output. Pt received 2 units of pRBC and remained on levophed. Pt  now sedated, intubated, oliguric. Today, patient given IVF bolus 1L w/slight improvement in UOP to 20cc/h. Cr slightly uptrending. K controlled w/Lokelma. Nephrology consulted for ANTONELLA.     PAST MEDICAL & SURGICAL HISTORY:  Medical history unknown    No significant past surgical history          Allergies:  No Known Allergies      Home Medications:   chlorhexidine 0.12% Liquid 15 milliLiter(s) Oral Mucosa every 12 hours  chlorhexidine 4% Liquid 1 Application(s) Topical <User Schedule>  dextrose 40% Gel 15 Gram(s) Oral once  dextrose 5%. 1000 milliLiter(s) IV Continuous <Continuous>  dextrose 5%. 1000 milliLiter(s) IV Continuous <Continuous>  dextrose 50% Injectable 25 Gram(s) IV Push once  dextrose 50% Injectable 12.5 Gram(s) IV Push once  dextrose 50% Injectable 25 Gram(s) IV Push once  folic acid Injectable 1 milliGRAM(s) IV Push daily  glucagon  Injectable 1 milliGRAM(s) IntraMuscular once  insulin lispro (ADMELOG) corrective regimen sliding scale   SubCutaneous three times a day before meals  insulin lispro (ADMELOG) corrective regimen sliding scale   SubCutaneous at bedtime  insulin regular Infusion 4 Unit(s)/Hr IV Continuous <Continuous>  norepinephrine Infusion 0.05 MICROgram(s)/kG/Min IV Continuous <Continuous>  pantoprazole  Injectable 40 milliGRAM(s) IV Push every 12 hours  phytonadione  IVPB 10 milliGRAM(s) IV Intermittent daily  piperacillin/tazobactam IVPB.. 2.25 Gram(s) IV Intermittent every 6 hours  propofol Infusion 5 MICROgram(s)/kG/Min IV Continuous <Continuous>  sodium zirconium cyclosilicate 10 Gram(s) Oral every 8 hours  thiamine IVPB 500 milliGRAM(s) IV Intermittent every 8 hours  vasopressin Infusion 0.04 Unit(s)/Min IV Continuous <Continuous>      Hospital Medications:   MEDICATIONS  (STANDING):  chlorhexidine 0.12% Liquid 15 milliLiter(s) Oral Mucosa every 12 hours  chlorhexidine 4% Liquid 1 Application(s) Topical <User Schedule>  dextrose 40% Gel 15 Gram(s) Oral once  dextrose 5%. 1000 milliLiter(s) (50 mL/Hr) IV Continuous <Continuous>  dextrose 5%. 1000 milliLiter(s) (100 mL/Hr) IV Continuous <Continuous>  dextrose 50% Injectable 25 Gram(s) IV Push once  dextrose 50% Injectable 12.5 Gram(s) IV Push once  dextrose 50% Injectable 25 Gram(s) IV Push once  folic acid Injectable 1 milliGRAM(s) IV Push daily  glucagon  Injectable 1 milliGRAM(s) IntraMuscular once  insulin lispro (ADMELOG) corrective regimen sliding scale   SubCutaneous three times a day before meals  insulin lispro (ADMELOG) corrective regimen sliding scale   SubCutaneous at bedtime  insulin regular Infusion 4 Unit(s)/Hr (4 mL/Hr) IV Continuous <Continuous>  norepinephrine Infusion 0.05 MICROgram(s)/kG/Min (4.26 mL/Hr) IV Continuous <Continuous>  pantoprazole  Injectable 40 milliGRAM(s) IV Push every 12 hours  phytonadione  IVPB 10 milliGRAM(s) IV Intermittent daily  piperacillin/tazobactam IVPB.. 2.25 Gram(s) IV Intermittent every 6 hours  propofol Infusion 5 MICROgram(s)/kG/Min (1.8 mL/Hr) IV Continuous <Continuous>  sodium zirconium cyclosilicate 10 Gram(s) Oral every 8 hours  thiamine IVPB 500 milliGRAM(s) IV Intermittent every 8 hours  vasopressin Infusion 0.04 Unit(s)/Min (2.4 mL/Hr) IV Continuous <Continuous>      SOCIAL HISTORY:  Denies ETOh, Smoking,     Family History:  FAMILY HISTORY:        VITALS:  T(F): 96.6 (01-01-21 @ 10:00), Max: 100.3 (01-01-21 @ 03:00)  HR: 87 (01-01-21 @ 11:20)  BP: 117/58 (01-01-21 @ 09:00)  RR: 17 (01-01-21 @ 11:20)  SpO2: 100% (01-01-21 @ 11:20)  Wt(kg): --    12-31 @ 07:01 - 01-01 @ 07:00  --------------------------------------------------------  IN: 3573.2 mL / OUT: 480 mL / NET: 3093.2 mL    01-01 @ 07:01 - 01-01 @ 13:20  --------------------------------------------------------  IN: 2510.6 mL / OUT: 55 mL / NET: 2455.6 mL        CAPILLARY BLOOD GLUCOSE      POCT Blood Glucose.: 291 mg/dL (01 Jan 2021 13:08)  POCT Blood Glucose.: 342 mg/dL (01 Jan 2021 11:38)  POCT Blood Glucose.: 80 mg/dL (31 Dec 2020 18:08)      Review of Systems:  unable to obtain    PHYSICAL EXAM:  GENERAL: intubated, sedated, FiO2 40%   HEENT: no JVP  CHEST/LUNG: Bilateral clear breath sounds  HEART: Regular rate and rhythm, no murmur, no gallops, no rub   ABDOMEN: Soft, nontender, non distended  EXTREMITIES: no pedal edema      LABS:  01-01    138  |  99  |  44<H>  ----------------------------<  303<H>  4.9   |  17<L>  |  2.73<H>    Ca    7.0<L>      01 Jan 2021 12:45  Phos  6.3     01-01  Mg     1.7     01-01    TPro  4.4<L>  /  Alb  2.4<L>  /  TBili  2.7<H>  /  DBili      /  AST      /  ALT      /  AlkPhos  63  01-01    Creatinine Trend: 2.73 <--, 2.55 <--, 2.63 <--, 2.26 <--, 2.17 <--, 2.72 <--, 2.93 <--                        8.3    8.17  )-----------( 43       ( 01 Jan 2021 12:45 )             23.0     Urine Studies:  Urinalysis Basic - ( 31 Dec 2020 11:53 )    Color: Yellow / Appearance: Clear / SG: <=1.005 / pH:   Gluc:  / Ketone: NEGATIVE  / Bili: NEGATIVE / Urobili: 0.2 E.U./dL   Blood:  / Protein: NEGATIVE mg/dL / Nitrite: NEGATIVE   Leuk Esterase: NEGATIVE / RBC:  / WBC    Sq Epi:  / Non Sq Epi:  / Bacteria:             85M no reported PMHx who presented initially to [facility] ED after EMS found patient down on the street hypothermic to 77F and with altered mental status. Found to have ANTONELLA and hyperK. Nephrology consulted for ANTONELLA and hyperkalemia.      Assessment/Plan:   #hyperkalemia  #oliguric ANTONELLA on CKD vs ANTONELLA   #transaminitis, shock liver   #coagulopathy  #thrombocytopenia   multi-organ failure, unclear precipitant   unclear baseline creatinine   etiology of ANTONELLA likely pre-renal, vs intra-renal ischemic ATN, less likely rhabdo, renal infarct, ANCA vasculitis, and glomerulonephritis    will pursue advanced work-up if initial work-up negative     Recommend:   continue with Lokelma 10g q8h standing x48h and PRN K > 5.5   continue with bicarb pushes PRN to maintain HCO3 > 17   s/p IVF 1L bolus 1/1 AM, trend UOP and consider repeat bolus or starting on IVF NS @100cc/h this evening pending uLytes   obtain urine lytes (Na, Cr, Urea, Osm) and urine protein-creatinine ratio   repeat UA  daily CXR   q6h BMP + urine lytes   renal sono  bladder scan r/o obstruction   strict I/Os   renal diet   no absolute indication for renal replacement therapy at this time     Thank you for the opportunity to participate in the care of your patient. The nephrology service remains available to assist with any questions or concerns. Please feel free to reach us by paging the on-call nephrology fellow for urgent issues or as below.     Ronnell Stout M.D.   PGY-4, Nephrology Fellow   C: [phone]   P: [phone]   P: [phone]  Patient is a 85y old  Male who presents with a chief complaint of AMS, hypothermic, (01 Jan 2021 12:34)      HPI: 85M no reported PMHx who presented initially to [facility] ED after EMS found patient down on the street hypothermic to 77F and with altered mental status. Accepted to MICU for hypothermia and shock. Intubated upon arrival for respiratory distress. Repeat labs on arrival demonstrated worsening anemia to 5.9, worsening thrombocytopenia, severe acidosis, and coagulopathy. Central line and arterial line was placed. Sump inserted during time on intubation was placed to suction with coffee ground / dark brown output. Pt received 2 units of pRBC and remained on levophed. Pt  now sedated, intubated, oliguric. Today, patient given IVF bolus 1L w/minimal UOP. Cr slightly uptrending. K controlled w/Lokelma. Nephrology consulted for ANTONELLA.     PAST MEDICAL & SURGICAL HISTORY:  Medical history unknown    No significant past surgical history          Allergies:  No Known Allergies      Home Medications:   chlorhexidine 0.12% Liquid 15 milliLiter(s) Oral Mucosa every 12 hours  chlorhexidine 4% Liquid 1 Application(s) Topical <User Schedule>  dextrose 40% Gel 15 Gram(s) Oral once  dextrose 5%. 1000 milliLiter(s) IV Continuous <Continuous>  dextrose 5%. 1000 milliLiter(s) IV Continuous <Continuous>  dextrose 50% Injectable 25 Gram(s) IV Push once  dextrose 50% Injectable 12.5 Gram(s) IV Push once  dextrose 50% Injectable 25 Gram(s) IV Push once  folic acid Injectable 1 milliGRAM(s) IV Push daily  glucagon  Injectable 1 milliGRAM(s) IntraMuscular once  insulin lispro (ADMELOG) corrective regimen sliding scale   SubCutaneous three times a day before meals  insulin lispro (ADMELOG) corrective regimen sliding scale   SubCutaneous at bedtime  insulin regular Infusion 4 Unit(s)/Hr IV Continuous <Continuous>  norepinephrine Infusion 0.05 MICROgram(s)/kG/Min IV Continuous <Continuous>  pantoprazole  Injectable 40 milliGRAM(s) IV Push every 12 hours  phytonadione  IVPB 10 milliGRAM(s) IV Intermittent daily  piperacillin/tazobactam IVPB.. 2.25 Gram(s) IV Intermittent every 6 hours  propofol Infusion 5 MICROgram(s)/kG/Min IV Continuous <Continuous>  sodium zirconium cyclosilicate 10 Gram(s) Oral every 8 hours  thiamine IVPB 500 milliGRAM(s) IV Intermittent every 8 hours  vasopressin Infusion 0.04 Unit(s)/Min IV Continuous <Continuous>      Hospital Medications:   MEDICATIONS  (STANDING):  chlorhexidine 0.12% Liquid 15 milliLiter(s) Oral Mucosa every 12 hours  chlorhexidine 4% Liquid 1 Application(s) Topical <User Schedule>  dextrose 40% Gel 15 Gram(s) Oral once  dextrose 5%. 1000 milliLiter(s) (50 mL/Hr) IV Continuous <Continuous>  dextrose 5%. 1000 milliLiter(s) (100 mL/Hr) IV Continuous <Continuous>  dextrose 50% Injectable 25 Gram(s) IV Push once  dextrose 50% Injectable 12.5 Gram(s) IV Push once  dextrose 50% Injectable 25 Gram(s) IV Push once  folic acid Injectable 1 milliGRAM(s) IV Push daily  glucagon  Injectable 1 milliGRAM(s) IntraMuscular once  insulin lispro (ADMELOG) corrective regimen sliding scale   SubCutaneous three times a day before meals  insulin lispro (ADMELOG) corrective regimen sliding scale   SubCutaneous at bedtime  insulin regular Infusion 4 Unit(s)/Hr (4 mL/Hr) IV Continuous <Continuous>  norepinephrine Infusion 0.05 MICROgram(s)/kG/Min (4.26 mL/Hr) IV Continuous <Continuous>  pantoprazole  Injectable 40 milliGRAM(s) IV Push every 12 hours  phytonadione  IVPB 10 milliGRAM(s) IV Intermittent daily  piperacillin/tazobactam IVPB.. 2.25 Gram(s) IV Intermittent every 6 hours  propofol Infusion 5 MICROgram(s)/kG/Min (1.8 mL/Hr) IV Continuous <Continuous>  sodium zirconium cyclosilicate 10 Gram(s) Oral every 8 hours  thiamine IVPB 500 milliGRAM(s) IV Intermittent every 8 hours  vasopressin Infusion 0.04 Unit(s)/Min (2.4 mL/Hr) IV Continuous <Continuous>      SOCIAL HISTORY:  Denies ETOh, Smoking,     Family History:  FAMILY HISTORY:        VITALS:  T(F): 96.6 (01-01-21 @ 10:00), Max: 100.3 (01-01-21 @ 03:00)  HR: 87 (01-01-21 @ 11:20)  BP: 117/58 (01-01-21 @ 09:00)  RR: 17 (01-01-21 @ 11:20)  SpO2: 100% (01-01-21 @ 11:20)  Wt(kg): --    12-31 @ 07:01 - 01-01 @ 07:00  --------------------------------------------------------  IN: 3573.2 mL / OUT: 480 mL / NET: 3093.2 mL    01-01 @ 07:01 - 01-01 @ 13:20  --------------------------------------------------------  IN: 2510.6 mL / OUT: 55 mL / NET: 2455.6 mL        CAPILLARY BLOOD GLUCOSE      POCT Blood Glucose.: 291 mg/dL (01 Jan 2021 13:08)  POCT Blood Glucose.: 342 mg/dL (01 Jan 2021 11:38)  POCT Blood Glucose.: 80 mg/dL (31 Dec 2020 18:08)      Review of Systems:  unable to obtain    PHYSICAL EXAM:  GENERAL: intubated, sedated, FiO2 40%   HEENT: no JVP  CHEST/LUNG: Bilateral clear breath sounds  HEART: Regular rate and rhythm, no murmur, no gallops, no rub   ABDOMEN: Soft, nontender, non distended  EXTREMITIES: no pedal edema      LABS:  01-01    138  |  99  |  44<H>  ----------------------------<  303<H>  4.9   |  17<L>  |  2.73<H>    Ca    7.0<L>      01 Jan 2021 12:45  Phos  6.3     01-01  Mg     1.7     01-01    TPro  4.4<L>  /  Alb  2.4<L>  /  TBili  2.7<H>  /  DBili      /  AST      /  ALT      /  AlkPhos  63  01-01    Creatinine Trend: 2.73 <--, 2.55 <--, 2.63 <--, 2.26 <--, 2.17 <--, 2.72 <--, 2.93 <--                        8.3    8.17  )-----------( 43       ( 01 Jan 2021 12:45 )             23.0     Urine Studies:  Urinalysis Basic - ( 31 Dec 2020 11:53 )    Color: Yellow / Appearance: Clear / SG: <=1.005 / pH:   Gluc:  / Ketone: NEGATIVE  / Bili: NEGATIVE / Urobili: 0.2 E.U./dL   Blood:  / Protein: NEGATIVE mg/dL / Nitrite: NEGATIVE   Leuk Esterase: NEGATIVE / RBC:  / WBC    Sq Epi:  / Non Sq Epi:  / Bacteria:             85M no reported PMHx who presented initially to [facility] ED after EMS found patient down on the street hypothermic to 77F and with altered mental status. Found to have ANTONELLA and hyperK. Nephrology consulted for ANTONELLA and hyperkalemia.      Assessment/Plan:   #hyperkalemia  #oligo-anuric ANTONELLA on CKD vs ANTONELLA   #transaminitis, shock liver   #coagulopathy  #thrombocytopenia   multi-organ failure, unclear precipitant   unclear baseline creatinine   etiology of ANTONELLA likely pre-renal, vs intra-renal ischemic ATN, less likely rhabdo, renal infarct, ANCA vasculitis, and glomerulonephritis    will pursue advanced work-up if initial work-up negative   s/p Lokelma and bicarb pushes PRN   repeat labs pending     Recommend:   commence CVVHD net even fluid balance, 2K, 2L DFR,    q6h BMP Mg Phos Lactate while on CVVHD   obtain urine lytes (Na, Cr, Urea, Osm) and urine protein-creatinine ratio   repeat UA  daily CXR   q6h BMP + urine lytes   renal sono  bladder scan r/o obstruction   strict I/Os   renal diet     Thank you for the opportunity to participate in the care of your patient. The nephrology service remains available to assist with any questions or concerns. Please feel free to reach us by paging the on-call nephrology fellow for urgent issues or as below.     Ronnell Stout M.D.   PGY-4, Nephrology Fellow   C: [phone]   P: [phone]   P: [phone]

## 2021-01-01 NOTE — CONSULT NOTE ADULT - SUBJECTIVE AND OBJECTIVE BOX
CONSULT NOTE    HPI:  84 y/o man with no reported PMHx possibly Afib? ( states not on any medications) who presented initially to [facility] ED after EMS found patient down on the street hypothermic to 77F and with altered mental status. Unfortunately pt is a poor historian was not able to verbalize any symptoms or sequence of events due to his critical condition. Based off the ED provier note, pt arrived to the ED alert , interactive but mumbling.   In the ED VS T: 86 F Rectally HR: 66 BP: 86/45 RR: ? Saturating 100% on RA . Labs remarkable for HGb 8.2, Plt 70, K 6.7, Mg 3.2, bicarb 12, lactate 15, anion gap 12, Cr 2.93, glucose 498. AST//324 VBG: pH 6.98 pcO2 30, po2 16, o2 sat 11.  CXR demonstrating severe dextro scoliosis. CT head demonstrating no acute intracranial pathology and frontal soft tissue hematoma. Pt received IV CTZ 1g x 1, Sodium Bicarb amp x 1 and started on infusion, calcium gluconate/dextrose/insulin, Zyprexa, and started on levophed and pt  was accepted to MICU for hypothermia and shock. Upon arrival to the Medical ICU, pt was on NC but in respiratory distress using accessory muscles and belly breathing. Pt was mentating well but tachypneic to 35-40s. Pt was initially placed on BiPAP to decrease work of breathing but decision was made to intubate the patient for respiratory distress. Repeat labs on arrival demonstrated worsening anemia to 5.9, worsening thrombocytopenia, even lower bicarb, and coagulopathy. Central line and arterial line was placed. Sump inserted during time on intubation was placed to suction with coffee ground / dark brown output. Pt received 2 units of pRBC and remained on levophed. Pt  now sedated , intubated, and admitted to MICU for further evaluation and management of hypothermia, shock, and r/o gastrointestinal hemorrhage.  (31 Dec 2020 19:53)    Surgery Addendum:       PAST MEDICAL & SURGICAL HISTORY:  Medical history unknown    No significant past surgical history        PAST SURGICAL HISTORY:     REVIEW OF SYSTEMS:   Pertinent positives/negatives noted in HPI.     HOME MEDICATIONS:    ALLERGIES:  Allergies    No Known Allergies    Intolerances        SOCIAL HISTORY:    FAMILY HISTORY:      Vital Signs Last 24 Hrs  T(C): 35.1 (31 Dec 2020 22:58), Max: 35.1 (31 Dec 2020 22:58)  T(F): 95.2 (31 Dec 2020 22:58), Max: 95.2 (31 Dec 2020 22:58)  HR: 85 (31 Dec 2020 23:00) (66 - 116)  BP: 92/53 (31 Dec 2020 21:00) (60/32 - 169/76)  BP(mean): 68 (31 Dec 2020 21:00) (68 - 123)  RR: 20 (31 Dec 2020 23:00) (19 - 31)  SpO2: 100% (31 Dec 2020 23:00) (99% - 100%)    PHYSICAL EXAM:    LABS:                        8.8    7.96  )-----------( 57       ( 31 Dec 2020 23:46 )             26.5     12-31    143  |  104  |  35<H>  ----------------------------<  162<H>  6.5<HH>   |  11<L>  |  2.26<H>    Ca    7.0<L>      31 Dec 2020 23:46  Phos  9.3     12-31  Mg     1.8     12-31    TPro  4.5<L>  /  Alb  2.5<L>  /  TBili  2.3<H>  /  DBili  x   /  AST  >7000<H>  /  ALT  4515<H>  /  AlkPhos  63  12-31    PT/INR - ( 31 Dec 2020 23:46 )   PT: 27.5 sec;   INR: 2.39          PTT - ( 31 Dec 2020 23:46 )  PTT:44.3 sec  Urinalysis Basic - ( 31 Dec 2020 11:53 )    Color: Yellow / Appearance: Clear / SG: <=1.005 / pH: x  Gluc: x / Ketone: NEGATIVE  / Bili: NEGATIVE / Urobili: 0.2 E.U./dL   Blood: x / Protein: NEGATIVE mg/dL / Nitrite: NEGATIVE   Leuk Esterase: NEGATIVE / RBC: x / WBC x   Sq Epi: x / Non Sq Epi: x / Bacteria: x        RADIOLOGY AND ADDITIONAL STUDIES  CONSULT NOTE    HPI:  86 y/o M [facility]/ [facility]. Initially found T 77F. Alert and interactive but speech intelligible. In the ED VS T: 86 F Rectally HR: 66 BP: 86/45 RR: ? Saturating 100% on RA . Labs remarkable for HGb 8.2, Plt 70, K 6.7, Mg 3.2, bicarb 12, lactate 15, anion gap 12, Cr 2.93, glucose 498. AST//324 VBG: pH 6.98 pcO2 30, po2 16, o2 sat 11.  CXR demonstrating severe dextro scoliosis. CT head demonstrating no acute intracranial pathology and frontal soft tissue hematoma. Pt received IV CTZ 1g x 1, Sodium Bicarb amp x 1 and started on infusion, calcium gluconate/dextrose/insulin, Zyprexa, and started on levophed. Upon arrival to [facility] found in respiratory distress. Initially placed BiPAP but intubated due to increased work of breathing. Repeat labs revealed Hgb 5.9, Lactate 18,  ABG pH 7.1. NGT placed w/ return of 150cc dark output. Received 2 units pRBC, Bicarb, Insulin, Protonix drip. Surgery consulted for concern for mesenteric ischemia given persistently elevated lactate and consistency of NGT output. Promptly recommended CTA Chest A/P.       PAST MEDICAL & SURGICAL HISTORY:  Medical history unknown    Unknown past surgical history        PAST SURGICAL HISTORY:     REVIEW OF SYSTEMS:   Pertinent positives/negatives noted in HPI.     HOME MEDICATIONS:    ALLERGIES:  Allergies    No Known Allergies    Intolerances        SOCIAL HISTORY:    FAMILY HISTORY:      Vital Signs Last 24 Hrs  T(C): 35.1 (31 Dec 2020 22:58), Max: 35.1 (31 Dec 2020 22:58)  T(F): 95.2 (31 Dec 2020 22:58), Max: 95.2 (31 Dec 2020 22:58)  HR: 85 (31 Dec 2020 23:00) (66 - 116)  BP: 92/53 (31 Dec 2020 21:00) (60/32 - 169/76)  BP(mean): 68 (31 Dec 2020 21:00) (68 - 123)  RR: 20 (31 Dec 2020 23:00) (19 - 31)  SpO2: 100% (31 Dec 2020 23:00) (99% - 100%)    PHYSICAL EXAM:  General: resting in bed sedated  HEENT: NGT w/ dark coffee ground output  C/V: NSR  Pulm: Nonlabored breathing, no respiratory distress, intubated  Abd: soft, non-distended, non tender  JOHNNY: No palpable internal masses, brown stool on glove, no bleed  Extrem: WWP, no edema,    LABS:                        8.8    7.96  )-----------( 57       ( 31 Dec 2020 23:46 )             26.5     12-31    143  |  104  |  35<H>  ----------------------------<  162<H>  6.5<HH>   |  11<L>  |  2.26<H>    Ca    7.0<L>      31 Dec 2020 23:46  Phos  9.3     12-31  Mg     1.8     12-31    TPro  4.5<L>  /  Alb  2.5<L>  /  TBili  2.3<H>  /  DBili  x   /  AST  >7000<H>  /  ALT  4515<H>  /  AlkPhos  63  12-31    PT/INR - ( 31 Dec 2020 23:46 )   PT: 27.5 sec;   INR: 2.39          PTT - ( 31 Dec 2020 23:46 )  PTT:44.3 sec  Urinalysis Basic - ( 31 Dec 2020 11:53 )    Color: Yellow / Appearance: Clear / SG: <=1.005 / pH: x  Gluc: x / Ketone: NEGATIVE  / Bili: NEGATIVE / Urobili: 0.2 E.U./dL   Blood: x / Protein: NEGATIVE mg/dL / Nitrite: NEGATIVE   Leuk Esterase: NEGATIVE / RBC: x / WBC x   Sq Epi: x / Non Sq Epi: x / Bacteria: x        RADIOLOGY AND ADDITIONAL STUDIES  < from: CT Head No Cont (12.31.20 @ 13:57) >    FINDINGS: The CT examination demonstrates generalized volume loss. There is no midline shift or extra axial collections. The gray white differentiation appears within normal limits. There is no intracranial large. There is mild patchy hypodensity within the periventricular white matter which is nonspecific and may represent the sequela of small vessel ischemic disease in this patient.    There is left frontal scalp soft tissue swelling. The bony windows demonstrates no fractures. The visualized paranasal sinuses are within normal limits. The mastoid air cells are well aerated.    IMPRESSION: Left frontal scalp soft tissue swelling. No intracranial hemorrhage or calvarial fracture.    < end of copied text >

## 2021-01-02 LAB
A1C WITH ESTIMATED AVERAGE GLUCOSE RESULT: 5.5 % — SIGNIFICANT CHANGE UP (ref 4–5.6)
ALBUMIN SERPL ELPH-MCNC: 2.6 G/DL — LOW (ref 3.3–5)
ALBUMIN SERPL ELPH-MCNC: 2.9 G/DL — LOW (ref 3.3–5)
ALP SERPL-CCNC: 91 U/L — SIGNIFICANT CHANGE UP (ref 40–120)
ALP SERPL-CCNC: 96 U/L — SIGNIFICANT CHANGE UP (ref 40–120)
ALT FLD-CCNC: 2463 U/L — HIGH (ref 10–45)
ALT FLD-CCNC: 3114 U/L — HIGH (ref 10–45)
ANION GAP SERPL CALC-SCNC: 15 MMOL/L — SIGNIFICANT CHANGE UP (ref 5–17)
ANION GAP SERPL CALC-SCNC: 16 MMOL/L — SIGNIFICANT CHANGE UP (ref 5–17)
ANION GAP SERPL CALC-SCNC: 18 MMOL/L — HIGH (ref 5–17)
ANISOCYTOSIS BLD QL: SLIGHT — SIGNIFICANT CHANGE UP
APTT BLD: 37 SEC — HIGH (ref 27.5–35.5)
AST SERPL-CCNC: 2838 U/L — HIGH (ref 10–40)
AST SERPL-CCNC: 5191 U/L — HIGH (ref 10–40)
BASOPHILS # BLD AUTO: 0 K/UL — SIGNIFICANT CHANGE UP (ref 0–0.2)
BASOPHILS # BLD AUTO: 0.05 K/UL — SIGNIFICANT CHANGE UP (ref 0–0.2)
BASOPHILS NFR BLD AUTO: 0 % — SIGNIFICANT CHANGE UP (ref 0–2)
BASOPHILS NFR BLD AUTO: 0.3 % — SIGNIFICANT CHANGE UP (ref 0–2)
BILIRUB SERPL-MCNC: 3.4 MG/DL — HIGH (ref 0.2–1.2)
BILIRUB SERPL-MCNC: 4 MG/DL — HIGH (ref 0.2–1.2)
BUN SERPL-MCNC: 24 MG/DL — HIGH (ref 7–23)
BUN SERPL-MCNC: 37 MG/DL — HIGH (ref 7–23)
BUN SERPL-MCNC: 43 MG/DL — HIGH (ref 7–23)
BURR CELLS BLD QL SMEAR: PRESENT — SIGNIFICANT CHANGE UP
CALCIUM SERPL-MCNC: 7.1 MG/DL — LOW (ref 8.4–10.5)
CALCIUM SERPL-MCNC: 7.3 MG/DL — LOW (ref 8.4–10.5)
CALCIUM SERPL-MCNC: 7.9 MG/DL — LOW (ref 8.4–10.5)
CHLORIDE SERPL-SCNC: 100 MMOL/L — SIGNIFICANT CHANGE UP (ref 96–108)
CHLORIDE SERPL-SCNC: 100 MMOL/L — SIGNIFICANT CHANGE UP (ref 96–108)
CHLORIDE SERPL-SCNC: 99 MMOL/L — SIGNIFICANT CHANGE UP (ref 96–108)
CO2 SERPL-SCNC: 21 MMOL/L — LOW (ref 22–31)
CREAT SERPL-MCNC: 1.94 MG/DL — HIGH (ref 0.5–1.3)
CREAT SERPL-MCNC: 2.66 MG/DL — HIGH (ref 0.5–1.3)
CREAT SERPL-MCNC: 3.1 MG/DL — HIGH (ref 0.5–1.3)
DACRYOCYTES BLD QL SMEAR: SLIGHT — SIGNIFICANT CHANGE UP
EOSINOPHIL # BLD AUTO: 0 K/UL — SIGNIFICANT CHANGE UP (ref 0–0.5)
EOSINOPHIL # BLD AUTO: 0.12 K/UL — SIGNIFICANT CHANGE UP (ref 0–0.5)
EOSINOPHIL NFR BLD AUTO: 0 % — SIGNIFICANT CHANGE UP (ref 0–6)
EOSINOPHIL NFR BLD AUTO: 0.8 % — SIGNIFICANT CHANGE UP (ref 0–6)
ESTIMATED AVERAGE GLUCOSE: 111 MG/DL — SIGNIFICANT CHANGE UP (ref 68–114)
GIANT PLATELETS BLD QL SMEAR: PRESENT — SIGNIFICANT CHANGE UP
GLUCOSE BLDC GLUCOMTR-MCNC: 115 MG/DL — HIGH (ref 70–99)
GLUCOSE BLDC GLUCOMTR-MCNC: 117 MG/DL — HIGH (ref 70–99)
GLUCOSE BLDC GLUCOMTR-MCNC: 117 MG/DL — HIGH (ref 70–99)
GLUCOSE BLDC GLUCOMTR-MCNC: 68 MG/DL — LOW (ref 70–99)
GLUCOSE BLDC GLUCOMTR-MCNC: 75 MG/DL — SIGNIFICANT CHANGE UP (ref 70–99)
GLUCOSE SERPL-MCNC: 119 MG/DL — HIGH (ref 70–99)
GLUCOSE SERPL-MCNC: 157 MG/DL — HIGH (ref 70–99)
GLUCOSE SERPL-MCNC: 159 MG/DL — HIGH (ref 70–99)
HCT VFR BLD CALC: 24.5 % — LOW (ref 39–50)
HCT VFR BLD CALC: 25.6 % — LOW (ref 39–50)
HGB BLD-MCNC: 8.8 G/DL — LOW (ref 13–17)
HGB BLD-MCNC: 9 G/DL — LOW (ref 13–17)
HYPOCHROMIA BLD QL: SLIGHT — SIGNIFICANT CHANGE UP
IMM GRANULOCYTES NFR BLD AUTO: 0.2 % — SIGNIFICANT CHANGE UP (ref 0–1.5)
INR BLD: 1.87 — HIGH (ref 0.88–1.16)
LACTATE SERPL-SCNC: 3.2 MMOL/L — HIGH (ref 0.5–2)
LACTATE SERPL-SCNC: 4 MMOL/L — CRITICAL HIGH (ref 0.5–2)
LACTATE SERPL-SCNC: 4.8 MMOL/L — CRITICAL HIGH (ref 0.5–2)
LYMPHOCYTES # BLD AUTO: 0.25 K/UL — LOW (ref 1–3.3)
LYMPHOCYTES # BLD AUTO: 0.36 K/UL — LOW (ref 1–3.3)
LYMPHOCYTES # BLD AUTO: 1.7 % — LOW (ref 13–44)
LYMPHOCYTES # BLD AUTO: 2.8 % — LOW (ref 13–44)
MACROCYTES BLD QL: SLIGHT — SIGNIFICANT CHANGE UP
MAGNESIUM SERPL-MCNC: 1.7 MG/DL — SIGNIFICANT CHANGE UP (ref 1.6–2.6)
MAGNESIUM SERPL-MCNC: 1.8 MG/DL — SIGNIFICANT CHANGE UP (ref 1.6–2.6)
MAGNESIUM SERPL-MCNC: 2 MG/DL — SIGNIFICANT CHANGE UP (ref 1.6–2.6)
MANUAL SMEAR VERIFICATION: SIGNIFICANT CHANGE UP
MCHC RBC-ENTMCNC: 29.7 PG — SIGNIFICANT CHANGE UP (ref 27–34)
MCHC RBC-ENTMCNC: 30.6 PG — SIGNIFICANT CHANGE UP (ref 27–34)
MCHC RBC-ENTMCNC: 35.2 GM/DL — SIGNIFICANT CHANGE UP (ref 32–36)
MCHC RBC-ENTMCNC: 35.9 GM/DL — SIGNIFICANT CHANGE UP (ref 32–36)
MCV RBC AUTO: 84.5 FL — SIGNIFICANT CHANGE UP (ref 80–100)
MCV RBC AUTO: 85.1 FL — SIGNIFICANT CHANGE UP (ref 80–100)
METAMYELOCYTES # FLD: 9.2 % — HIGH (ref 0–0)
MICROCYTES BLD QL: SLIGHT — SIGNIFICANT CHANGE UP
MONOCYTES # BLD AUTO: 0 K/UL — SIGNIFICANT CHANGE UP (ref 0–0.9)
MONOCYTES # BLD AUTO: 0.18 K/UL — SIGNIFICANT CHANGE UP (ref 0–0.9)
MONOCYTES NFR BLD AUTO: 0 % — LOW (ref 2–14)
MONOCYTES NFR BLD AUTO: 1.2 % — LOW (ref 2–14)
MYELOCYTES NFR BLD: 2.8 % — HIGH (ref 0–0)
NEUTROPHILS # BLD AUTO: 11.01 K/UL — HIGH (ref 1.8–7.4)
NEUTROPHILS # BLD AUTO: 14.35 K/UL — HIGH (ref 1.8–7.4)
NEUTROPHILS NFR BLD AUTO: 77.8 % — HIGH (ref 43–77)
NEUTROPHILS NFR BLD AUTO: 95.8 % — HIGH (ref 43–77)
NEUTS BAND # BLD: 7.4 % — SIGNIFICANT CHANGE UP (ref 0–8)
NRBC # BLD: 0 /100 WBCS — SIGNIFICANT CHANGE UP (ref 0–0)
OVALOCYTES BLD QL SMEAR: SLIGHT — SIGNIFICANT CHANGE UP
PHOSPHATE SERPL-MCNC: 3.1 MG/DL — SIGNIFICANT CHANGE UP (ref 2.5–4.5)
PHOSPHATE SERPL-MCNC: 3.9 MG/DL — SIGNIFICANT CHANGE UP (ref 2.5–4.5)
PHOSPHATE SERPL-MCNC: 4.8 MG/DL — HIGH (ref 2.5–4.5)
PLAT MORPH BLD: ABNORMAL
PLATELET # BLD AUTO: 32 K/UL — LOW (ref 150–400)
PLATELET # BLD AUTO: 35 K/UL — LOW (ref 150–400)
POIKILOCYTOSIS BLD QL AUTO: SLIGHT — SIGNIFICANT CHANGE UP
POLYCHROMASIA BLD QL SMEAR: SLIGHT — SIGNIFICANT CHANGE UP
POTASSIUM SERPL-MCNC: 3.9 MMOL/L — SIGNIFICANT CHANGE UP (ref 3.5–5.3)
POTASSIUM SERPL-MCNC: 4.9 MMOL/L — SIGNIFICANT CHANGE UP (ref 3.5–5.3)
POTASSIUM SERPL-MCNC: 5 MMOL/L — SIGNIFICANT CHANGE UP (ref 3.5–5.3)
POTASSIUM SERPL-SCNC: 3.9 MMOL/L — SIGNIFICANT CHANGE UP (ref 3.5–5.3)
POTASSIUM SERPL-SCNC: 4.9 MMOL/L — SIGNIFICANT CHANGE UP (ref 3.5–5.3)
POTASSIUM SERPL-SCNC: 5 MMOL/L — SIGNIFICANT CHANGE UP (ref 3.5–5.3)
PROT SERPL-MCNC: 5.2 G/DL — LOW (ref 6–8.3)
PROT SERPL-MCNC: 5.2 G/DL — LOW (ref 6–8.3)
PROTHROM AB SERPL-ACNC: 21.8 SEC — HIGH (ref 10.6–13.6)
RBC # BLD: 2.88 M/UL — LOW (ref 4.2–5.8)
RBC # BLD: 3.03 M/UL — LOW (ref 4.2–5.8)
RBC # FLD: 14.5 % — SIGNIFICANT CHANGE UP (ref 10.3–14.5)
RBC # FLD: 14.5 % — SIGNIFICANT CHANGE UP (ref 10.3–14.5)
RBC BLD AUTO: ABNORMAL
SMUDGE CELLS # BLD: PRESENT — SIGNIFICANT CHANGE UP
SODIUM SERPL-SCNC: 136 MMOL/L — SIGNIFICANT CHANGE UP (ref 135–145)
SODIUM SERPL-SCNC: 137 MMOL/L — SIGNIFICANT CHANGE UP (ref 135–145)
SODIUM SERPL-SCNC: 138 MMOL/L — SIGNIFICANT CHANGE UP (ref 135–145)
TARGETS BLD QL SMEAR: SLIGHT — SIGNIFICANT CHANGE UP
WBC # BLD: 12.92 K/UL — HIGH (ref 3.8–10.5)
WBC # BLD: 14.98 K/UL — HIGH (ref 3.8–10.5)
WBC # FLD AUTO: 12.92 K/UL — HIGH (ref 3.8–10.5)
WBC # FLD AUTO: 14.98 K/UL — HIGH (ref 3.8–10.5)

## 2021-01-02 PROCEDURE — 99291 CRITICAL CARE FIRST HOUR: CPT

## 2021-01-02 PROCEDURE — 90947 DIALYSIS REPEATED EVAL: CPT

## 2021-01-02 PROCEDURE — 71045 X-RAY EXAM CHEST 1 VIEW: CPT | Mod: 26,76

## 2021-01-02 RX ORDER — PHENYLEPHRINE HYDROCHLORIDE 10 MG/ML
0.1 INJECTION INTRAVENOUS
Qty: 160 | Refills: 0 | Status: DISCONTINUED | OUTPATIENT
Start: 2021-01-02 | End: 2021-01-04

## 2021-01-02 RX ORDER — METOPROLOL TARTRATE 50 MG
2.5 TABLET ORAL EVERY 6 HOURS
Refills: 0 | Status: DISCONTINUED | OUTPATIENT
Start: 2021-01-02 | End: 2021-01-10

## 2021-01-02 RX ORDER — NOREPINEPHRINE BITARTRATE/D5W 8 MG/250ML
0.05 PLASTIC BAG, INJECTION (ML) INTRAVENOUS
Qty: 8 | Refills: 0 | Status: DISCONTINUED | OUTPATIENT
Start: 2021-01-02 | End: 2021-01-08

## 2021-01-02 RX ORDER — DEXTROSE 50 % IN WATER 50 %
25 SYRINGE (ML) INTRAVENOUS ONCE
Refills: 0 | Status: DISCONTINUED | OUTPATIENT
Start: 2021-01-02 | End: 2021-01-15

## 2021-01-02 RX ORDER — DEXTROSE 50 % IN WATER 50 %
25 SYRINGE (ML) INTRAVENOUS ONCE
Refills: 0 | Status: DISCONTINUED | OUTPATIENT
Start: 2021-01-02 | End: 2021-01-02

## 2021-01-02 RX ORDER — DEXTROSE 50 % IN WATER 50 %
25 SYRINGE (ML) INTRAVENOUS ONCE
Refills: 0 | Status: COMPLETED | OUTPATIENT
Start: 2021-01-02 | End: 2021-01-02

## 2021-01-02 RX ORDER — PHENYLEPHRINE HYDROCHLORIDE 10 MG/ML
0.1 INJECTION INTRAVENOUS
Qty: 40 | Refills: 0 | Status: DISCONTINUED | OUTPATIENT
Start: 2021-01-02 | End: 2021-01-02

## 2021-01-02 RX ADMIN — PROPOFOL 1.8 MICROGRAM(S)/KG/MIN: 10 INJECTION, EMULSION INTRAVENOUS at 16:27

## 2021-01-02 RX ADMIN — PIPERACILLIN AND TAZOBACTAM 200 GRAM(S): 4; .5 INJECTION, POWDER, LYOPHILIZED, FOR SOLUTION INTRAVENOUS at 23:13

## 2021-01-02 RX ADMIN — PHENYLEPHRINE HYDROCHLORIDE 2.25 MICROGRAM(S)/KG/MIN: 10 INJECTION INTRAVENOUS at 14:34

## 2021-01-02 RX ADMIN — Medication 25 GRAM(S): at 16:35

## 2021-01-02 RX ADMIN — Medication 105 MILLIGRAM(S): at 05:52

## 2021-01-02 RX ADMIN — Medication 105 MILLIGRAM(S): at 13:18

## 2021-01-02 RX ADMIN — Medication 1 MILLIGRAM(S): at 11:50

## 2021-01-02 RX ADMIN — PHENYLEPHRINE HYDROCHLORIDE 2.25 MICROGRAM(S)/KG/MIN: 10 INJECTION INTRAVENOUS at 11:15

## 2021-01-02 RX ADMIN — PANTOPRAZOLE SODIUM 40 MILLIGRAM(S): 20 TABLET, DELAYED RELEASE ORAL at 23:13

## 2021-01-02 RX ADMIN — PIPERACILLIN AND TAZOBACTAM 200 GRAM(S): 4; .5 INJECTION, POWDER, LYOPHILIZED, FOR SOLUTION INTRAVENOUS at 11:49

## 2021-01-02 RX ADMIN — Medication 105 MILLIGRAM(S): at 21:31

## 2021-01-02 RX ADMIN — PIPERACILLIN AND TAZOBACTAM 200 GRAM(S): 4; .5 INJECTION, POWDER, LYOPHILIZED, FOR SOLUTION INTRAVENOUS at 06:13

## 2021-01-02 RX ADMIN — Medication 5.63 MICROGRAM(S)/KG/MIN: at 05:56

## 2021-01-02 RX ADMIN — PANTOPRAZOLE SODIUM 40 MILLIGRAM(S): 20 TABLET, DELAYED RELEASE ORAL at 11:49

## 2021-01-02 RX ADMIN — PROPOFOL 1.8 MICROGRAM(S)/KG/MIN: 10 INJECTION, EMULSION INTRAVENOUS at 10:25

## 2021-01-02 RX ADMIN — PIPERACILLIN AND TAZOBACTAM 200 GRAM(S): 4; .5 INJECTION, POWDER, LYOPHILIZED, FOR SOLUTION INTRAVENOUS at 18:00

## 2021-01-02 RX ADMIN — CHLORHEXIDINE GLUCONATE 15 MILLILITER(S): 213 SOLUTION TOPICAL at 11:49

## 2021-01-02 RX ADMIN — Medication 2.5 MILLIGRAM(S): at 11:49

## 2021-01-02 RX ADMIN — PHENYLEPHRINE HYDROCHLORIDE 1.13 MICROGRAM(S)/KG/MIN: 10 INJECTION INTRAVENOUS at 19:00

## 2021-01-02 RX ADMIN — CHLORHEXIDINE GLUCONATE 15 MILLILITER(S): 213 SOLUTION TOPICAL at 23:13

## 2021-01-02 RX ADMIN — PIPERACILLIN AND TAZOBACTAM 200 GRAM(S): 4; .5 INJECTION, POWDER, LYOPHILIZED, FOR SOLUTION INTRAVENOUS at 00:19

## 2021-01-02 NOTE — DIETITIAN INITIAL EVALUATION ADULT. - OTHER CALCULATIONS
fluids per team.IBW used due to BMI<30.Increased kcal and protein needs due to Vent/CVVHD and age demands

## 2021-01-02 NOTE — DIETITIAN INITIAL EVALUATION ADULT. - OTHER INFO
84y/o male with no known history(suspected ETOH abuse) presented to ED after being found on street with hypothermia 77F,altered mentation and coffee ground .Now intubated(AC./CMV)on pressors.(Phenylephrine).On Propofol(12:262kcal).NGT for suctioning. No N/V/D/C .No BM reported .Skin with bruised left forehead./no PU. MAP>60's presently. Now on CVVHD due to renal parameters. Plan for NGT placement with initiation of Tf today. Per estimated height of 5ft 7inches patient is 90% of IBW with BMI:20.7.RD discussed recommendations for NGT feeds with resident, recommended: Nepro @20cc/hr increase by 10cc q4hrs to goal rate of 45cc/hr(with present Propofol(12mc:262kcal) provides:2206kcal and 1080ccTV and 86gmprotein(100% of RDI's for kcal and 85% of protein @1.5gm/IBW.Unable to contact family reported .Suspected potential for intolerance of tube feeds due to increased lactate .Will monitor at high risk for protocol.

## 2021-01-02 NOTE — DIETITIAN INITIAL EVALUATION ADULT. - PHYSCIAL ASSESSMENT
Suspected some degree of malnutrition .?ETOH abuse visually appears thin .Age appropriate loss of fat and muscle../debilitated

## 2021-01-02 NOTE — PROGRESS NOTE ADULT - SUBJECTIVE AND OBJECTIVE BOX
Patient is a 85y Male seen and evaluated at bedside.   started on CVVHD  tolerating well  BP acceptable  remains on pressors     Meds:    chlorhexidine 0.12% Liquid 15 every 12 hours  chlorhexidine 4% Liquid 1 <User Schedule>  CRRT Treatment  <Continuous>  dextrose 40% Gel 15 once  dextrose 5%. 1000 <Continuous>  dextrose 5%. 1000 <Continuous>  dextrose 50% Injectable 25 once  dextrose 50% Injectable 12.5 once  dextrose 50% Injectable 25 once  folic acid Injectable 1 daily  glucagon  Injectable 1 once  insulin lispro (ADMELOG) corrective regimen sliding scale  three times a day before meals  insulin lispro (ADMELOG) corrective regimen sliding scale  at bedtime  metoprolol tartrate Injectable 2.5 every 6 hours  norepinephrine Infusion 0.05 <Continuous>  pantoprazole  Injectable 40 every 12 hours  phenylephrine    Infusion 0.1 <Continuous>  piperacillin/tazobactam IVPB.. 2.25 every 6 hours  propofol Infusion 5 <Continuous>  PureFlow Dialysate RFP-400 (K 2 / Ca 3) 5000 <Continuous>  thiamine IVPB 500 every 8 hours      T(C): , Max: 37.8 (01-01-21 @ 19:17)  T(F): , Max: 100 (01-01-21 @ 19:17)  HR: 84 (01-02-21 @ 13:33)  BP: 87/51 (01-02-21 @ 13:33)  BP(mean): 63 (01-02-21 @ 13:33)  RR: 17 (01-02-21 @ 13:33)  SpO2: 100% (01-02-21 @ 13:33)  Wt(kg): --    01-01 @ 07:01  -  01-02 @ 07:00  --------------------------------------------------------  IN: 4367.3 mL / OUT: 811 mL / NET: 3556.3 mL    01-02 @ 07:01  -  01-02 @ 14:27  --------------------------------------------------------  IN: 527 mL / OUT: 482 mL / NET: 45 mL        Review of Systems:  unable to obtain    PHYSICAL EXAM:  GENERAL: intubated, sedated, FiO2 40%   CHEST/LUNG: Bilateral clear breath sounds  HEART: Regular rate and rhythm, no murmur, no gallops, no rub   ABDOMEN: Soft, nontender, non distended  EXTREMITIES: no pedal edema  ACCESS: R fem temp hemodialysis catheter       LABS:                        9.0    12.92 )-----------( 35       ( 02 Jan 2021 06:21 )             25.6     01-02    137  |  100  |  37<H>  ----------------------------<  119<H>  5.0   |  21<L>  |  2.66<H>    Ca    7.3<L>      02 Jan 2021 06:21  Phos  3.9     01-02  Mg     1.8     01-02    TPro  5.2<L>  /  Alb  2.9<L>  /  TBili  3.4<H>  /  DBili  x   /  AST  5191<H>  /  ALT  3114<H>  /  AlkPhos  96  01-02      PT/INR - ( 02 Jan 2021 06:21 )   PT: 21.8 sec;   INR: 1.87          PTT - ( 02 Jan 2021 06:21 )  PTT:37.0 sec    Sodium, Random Urine: 57 mmol/L (01-01 @ 15:34)  Creatinine, Random Urine: 25 mg/dL (01-01 @ 15:34)  Osmolality, Random Urine: 253 mosm/kg (01-01 @ 15:34)        RADIOLOGY & ADDITIONAL STUDIES:

## 2021-01-02 NOTE — PROGRESS NOTE ADULT - ASSESSMENT
84 y/o man with no reported PMHx (possibly Afib, states not on any medications) who presented initially to [facility] ED after EMS found patient down on the street hypothermic to 77F and with altered mental status. Pt admitted to MICU for further evaluation and management of hypothermia, shock, and r/o ischemic bowel/ gastrointestinal hemorrhage.     NEURO  Patient intubated, sedated on propofol gtt    #Encephalopathy  Patient presented to [facility] with AMS. CTH noncontrast without acute bleed or fracture. Alcohol level elevated with hx of alcohol use. UTox negative  - pt initially with severe hypothermia, metabolic derangements, and shock likely contributing to altered mental status.   - currently intubated and sedated    CARDIOVASCULAR  #SHOCK  Pt found to be hypotensive to 80s/40s likely 2/2 to hemorrhagic/hypovolemic shock vs septic shock (unclear source) vs distributive (rewarming). Hemorrhagic source evidenced by Hgb drop from 8.2 to 5.9 with coffee grounds suctioned from sump placed. Pt with alerted mentation, low urine output, and cold to touch.   - On levophed  - Start phenylephrine in setting of Afib with RVR  - volume resuscitation with blood products as well as intermittent fluid boluses  - UOP about 15-20cc/hr     #Afib with RVR  - Intermittently in Afib with RVR on 1/2 in AM  - Started phenylephrine, weaning off levo  - Started lopressor 2.5 q6    #Troponemia  Troponin T 0.14->0.16  - trend troponin to peak  - f/u EKG    PULMONARY  #Respiratory Failure  Pt not hypoxic but with tachypnea, increased WOB with use of accessory muscles. Pt intubated due to concern for eventual respiratory failure and air way protection  - Currently on VC/AC Vent Settings: 50/450/14/5    GASTROINTESTINAL  #r/o Bowel Ischemia  Pt with Hb 5.8 upon MICU arrival, with coffee ground output from sump. In the setting of shock and low Hb, and afib noted at [facility],   -CTA A/P performed with no intestinal bleed or mesenteric ischemia  -Surgery consulted    #r/o Gastrointestinal Hemorrhage  See above  - c/w IV protonix gtt and octreotide gtt  - IV Protonix Q12H  - d/c octreotide drip due to no evidence of portal HTN or cirrhosis on imagining   - GI consulted, f/u recs    #Acute liver failure  LFTs > 7000, with coagulopathy and Utox negative. Acetaminophen level negative. Alcohol level 26. Hx of alcohol use. Liver failure possibly 2/2 to shock liver   - c/w NAC protocol  - Vitamin K adminstered for coagulopathy  - monitor INR   - continue to trend LFTs    RENAL  #Anion Gap Metabolic Acidosis  Likely 2/2 lactic acidosis. AG of 28 on presentation with pH 7.19, pCO2 15, bicarb 6 on arrival to MICU. Initiated on bicarb gtt here and s/p 2 amps bicarb  - bicarb gtt turned off  - f/u Q4H BMP    #Acute Renal Failure  Pt with sCr 2.93 on presentation (unknown baseline), improved s/p fluid boluses in ED. However now trending back up to 2.26  - f/u urine lytes  - renal consulted    #Hyperkalemia   K+5.5 on arrival at MICU with repeat 6.5. In the setting of ANTONELLA  - K now downtrending  - initiated Lokelma 10mg Q8H   - Q4H bmp    INFECTIOUS DISEASE  #LLL consolidation vs atelectasis  - will emprically cover for Zosyn due to patient being in shock  - f/u MRSA swab    ENDOCRINE  #Hyperglycemia  Possibly in the setting of sepsis?   - insulin infusion following hyperglycemia protocol    HEME  #Normocytic Anemia   On presentation, patient with Hb 8.2, with repeat of 5.8 on MICU arrival. Now s/p 2u pRBC, with posttransfusion Hb 8.8    #Thrombocytopenia 2/2 DIC  - Plt 70 on presentation, and repeat in the 50s. Likely 2/2 to liver failure and DIC   - Platelets continuing to downtrend at 35 on 1/2    #Elevated INR  In the setting of acute liver failure. Repeat INR 2.39  - IV vitamin K 10mg daily x 3 days per GI  - ordered for 2u FFP and 1u cryoprecipitate    MSK  #R pubic rim fracture  Noted on CTA abd/pelvis with adjacent hematoma without active signs of bleeding (per wet radiology read).   - Ortho consulted, appreciate recs      - Mcbride in place  - some leakage of urine onto the bed, mcbride may be mispositioned, will replace mcbride    FLUIDS/ELECTROLYTES/NUTRITION  -IVF  -Monitor, Replete to K>4 and Mg>2  -Diet: NPO  PROPHYLAXIS  -DVT: hold in the setting of possible bleed  -GI ppx: protonix BID    DISPO: MICU  CODE STATUS: FULL 84 y/o man with no reported PMHx (possibly Afib, states not on any medications) who presented initially to [facility] ED after EMS found patient down on the street hypothermic to 77F and with altered mental status. Pt admitted to MICU for further evaluation and management of hypothermia, shock, and r/o ischemic bowel/ gastrointestinal hemorrhage.     NEURO  Patient intubated, sedated on propofol gtt    #Encephalopathy  Patient presented to [facility] with AMS. CTH noncontrast without acute bleed or fracture. Alcohol level elevated with hx of alcohol use. UTox negative  - pt initially with severe hypothermia, metabolic derangements, and shock likely contributing to altered mental status.   - currently intubated and sedated    CARDIOVASCULAR  #SHOCK  Pt found to be hypotensive to 80s/40s likely 2/2 to hemorrhagic/hypovolemic shock vs septic shock (unclear source) vs distributive (rewarming). Hemorrhagic source evidenced by Hgb drop from 8.2 to 5.9 with coffee grounds suctioned from sump placed. Pt with alerted mentation, low urine output, and cold to touch.   - On levophed  - Start phenylephrine in setting of Afib with RVR  - volume resuscitation with blood products as well as intermittent fluid boluses  - UOP about 15-20cc/hr     #Afib with RVR  - Intermittently in Afib with RVR on 1/2 in AM  - Started phenylephrine, weaning off levo  - Started lopressor 2.5 q6    #Troponemia  Troponin T 0.14->0.16  - trend troponin to peak  - f/u EKG    PULMONARY  #Respiratory Failure  Pt not hypoxic but with tachypnea, increased WOB with use of accessory muscles. Pt intubated due to concern for eventual respiratory failure and air way protection  - Currently on VC/AC Vent Settings: 50/450/14/5    GASTROINTESTINAL  #r/o Bowel Ischemia  Pt with Hb 5.8 upon MICU arrival, with coffee ground output from sump. In the setting of shock and low Hb, and afib noted at [facility],   -CTA A/P performed with no intestinal bleed or mesenteric ischemia  -Surgery consulted    #r/o Gastrointestinal Hemorrhage  See above  - c/w IV protonix gtt and octreotide gtt  - IV Protonix Q12H  - d/c octreotide drip due to no evidence of portal HTN or cirrhosis on imagining   - GI consulted, f/u recs    #Acute liver failure  LFTs > 7000, with coagulopathy and Utox negative. Acetaminophen level negative. Alcohol level 26. Hx of alcohol use. Liver failure possibly 2/2 to shock liver   - c/w NAC protocol  - Vitamin K adminstered for coagulopathy  - monitor INR   - continue to trend LFTs    RENAL  #Anion Gap Metabolic Acidosis  Likely 2/2 lactic acidosis. AG of 28 on presentation with pH 7.19, pCO2 15, bicarb 6 on arrival to MICU. Initiated on bicarb gtt here and s/p 2 amps bicarb  - bicarb gtt turned off  - f/u Q4H BMP    #Acute Renal Failure  Pt with sCr 2.93 on presentation (unknown baseline), improved s/p fluid boluses in ED. However now trending back up to 2.26  - f/u urine lytes  - renal consulted    #Hyperkalemia   K+5.5 on arrival at MICU with repeat 6.5. In the setting of ANTONELLA  - K now downtrending  - initiated Lokelma 10mg Q8H   - Q4H bmp    INFECTIOUS DISEASE  #LLL consolidation vs atelectasis  - will emprically cover for Zosyn due to patient being in shock  - MRSA swab negative    ENDOCRINE  #Hyperglycemia  Possibly in the setting of sepsis?   - insulin infusion following hyperglycemia protocol    HEME  #Normocytic Anemia   On presentation, patient with Hb 8.2, with repeat of 5.8 on MICU arrival. Now s/p 2u pRBC, with posttransfusion Hb 8.8    #Thrombocytopenia 2/2 DIC  - Plt 70 on presentation, and repeat in the 50s. Likely 2/2 to liver failure and DIC   - Platelets continuing to downtrend at 35 on 1/2    #Elevated INR  In the setting of acute liver failure. Repeat INR 2.39  - IV vitamin K 10mg daily x 3 days per GI  - ordered for 2u FFP and 1u cryoprecipitate    MSK  #R pubic rim fracture  Noted on CTA abd/pelvis with adjacent hematoma without active signs of bleeding (per wet radiology read).   - Ortho consulted, appreciate recs      - Mcbride in place  - some leakage of urine onto the bed, mcbride may be mispositioned, will replace mcbride    FLUIDS/ELECTROLYTES/NUTRITION  -IVF  -Monitor, Replete to K>4 and Mg>2  -Diet: NPO  PROPHYLAXIS  -DVT: hold in the setting of possible bleed  -GI ppx: protonix BID    DISPO: MICU  CODE STATUS: FULL

## 2021-01-02 NOTE — PROGRESS NOTE ADULT - ASSESSMENT
85M no reported PMHx who presented initially to [facility] ED after EMS found patient down on the street hypothermic to 77F and with altered mental status. Found to have ANTONELLA and hyperK. Nephrology consulted for ANTONELLA and hyperkalemia.      Assessment/Plan:   #hyperkalemia  #oligo-anuric ANTONELLA on CKD vs ANTONELLA   #transaminitis, shock liver   #coagulopathy  #thrombocytopenia   multi-organ failure, unclear precipitant   unclear baseline creatinine   etiology of ANTONELLA likely pre-renal now w/resultant intra-renal ischemic ATN component, less likely rhabdo, renal infarct, ANCA vasculitis, and glomerulonephritis    will pursue advanced work-up if initial work-up negative     Recommend:   continue with CVVHD net negative 100cc/h fluid balance, 2K, 2L DFR,    q6h BMP Mg Phos Lactate while on CVVHD   repeat UA, urine lytes (Na, Cr, Urea, Osm) and urine protein-creatinine ratio   daily CXR   q6h BMP while on CVVHD   renal sono consistent with ANTONELLA   strict I/Os   renal diet     Patient was seen and evaluated on dialysis.     Hemodynamically stable.     CVVHD:   QB: 250 mL/min   Dialysis Fluid Rate: 2L/h   Prescribed UF set to achieve net negative 100 mL/hr   Actual UF: 153mL/hr     Patient is tolerating the procedure well.   Continue CVVHD treatment as prescribed.    Thank you for the opportunity to participate in the care of your patient. The nephrology service remains available to assist with any questions or concerns. Please feel free to reach us by paging the on-call nephrology fellow for urgent issues or as below.     Ronnell Stout M.D.   PGY-4, Nephrology Fellow   C: [phone]   P: [phone]

## 2021-01-02 NOTE — DIETITIAN INITIAL EVALUATION ADULT. - ENTERAL
Nepro @20cc/hr increase by 10cc q4hrs to goal rate of 45cc/hr providinccTV/1944kcal and 86gmprotein and 828cc free water

## 2021-01-02 NOTE — PROGRESS NOTE ADULT - SUBJECTIVE AND OBJECTIVE BOX
OVERNIGHT EVENTS: Hypoglycemic to 68, gave 1 amp of D50; nighttime Vit K dose held because on CVVHD (increased risk of clotting)    SUBJECTIVE / INTERVAL HPI: Patient seen and examined at bedside. Resting comfortably; intubated and on CVVHD    VITAL SIGNS:  Vital Signs Last 24 Hrs  T(C): 36.6 (02 Jan 2021 07:00), Max: 37.8 (01 Jan 2021 19:17)  T(F): 97.8 (02 Jan 2021 07:00), Max: 100 (01 Jan 2021 19:17)  HR: 103 (02 Jan 2021 08:00) (87 - 119)  BP: 103/59 (02 Jan 2021 08:00) (92/56 - 117/58)  BP(mean): 75 (02 Jan 2021 08:00) (69 - 83)  RR: 18 (02 Jan 2021 08:00) (0 - 44)  SpO2: 100% (02 Jan 2021 08:00) (98% - 100%)  I&O's Summary    01 Jan 2021 07:01  -  02 Jan 2021 07:00  --------------------------------------------------------  IN: 4367.3 mL / OUT: 811 mL / NET: 3556.3 mL    02 Jan 2021 07:01  -  02 Jan 2021 08:42  --------------------------------------------------------  IN: 129 mL / OUT: 129 mL / NET: 0 mL        PHYSICAL EXAM:    General: Intubated and sedated on propofol  HEENT: NC/AT; PERRL, anicteric sclera; MMM  Neck: supple  Cardiovascular: +S1/S2; RRR  Respiratory: CTA B/L; no W/R/R; intubated  Gastrointestinal: soft, NT/ND; +BSx4  Extremities: WWP; no edema, clubbing or cyanosis  Vascular: 2+ radial, DP/PT pulses B/L  Neurological: Sedated with propofol; responds to noxious stimuli    MEDICATIONS:  MEDICATIONS  (STANDING):  chlorhexidine 0.12% Liquid 15 milliLiter(s) Oral Mucosa every 12 hours  chlorhexidine 4% Liquid 1 Application(s) Topical <User Schedule>  CRRT Treatment    <Continuous>  dextrose 40% Gel 15 Gram(s) Oral once  dextrose 5%. 1000 milliLiter(s) (100 mL/Hr) IV Continuous <Continuous>  dextrose 5%. 1000 milliLiter(s) (50 mL/Hr) IV Continuous <Continuous>  dextrose 50% Injectable 25 Gram(s) IV Push once  dextrose 50% Injectable 12.5 Gram(s) IV Push once  dextrose 50% Injectable 25 Gram(s) IV Push once  folic acid Injectable 1 milliGRAM(s) IV Push daily  glucagon  Injectable 1 milliGRAM(s) IntraMuscular once  insulin lispro (ADMELOG) corrective regimen sliding scale   SubCutaneous three times a day before meals  insulin lispro (ADMELOG) corrective regimen sliding scale   SubCutaneous at bedtime  norepinephrine Infusion 0.05 MICROgram(s)/kG/Min (5.63 mL/Hr) IV Continuous <Continuous>  pantoprazole  Injectable 40 milliGRAM(s) IV Push every 12 hours  phytonadione  IVPB 10 milliGRAM(s) IV Intermittent daily  piperacillin/tazobactam IVPB.. 2.25 Gram(s) IV Intermittent every 6 hours  propofol Infusion 5 MICROgram(s)/kG/Min (1.8 mL/Hr) IV Continuous <Continuous>  PureFlow Dialysate RFP-400 (K 2 / Ca 3) 5000 milliLiter(s) (2000 mL/Hr) CRRT <Continuous>  thiamine IVPB 500 milliGRAM(s) IV Intermittent every 8 hours    MEDICATIONS  (PRN):      ALLERGIES:  Allergies    No Known Allergies    Intolerances        LABS:                        9.0    12.92 )-----------( 35       ( 02 Jan 2021 06:21 )             25.6     01-02    137  |  100  |  37<H>  ----------------------------<  119<H>  5.0   |  21<L>  |  2.66<H>    Ca    7.3<L>      02 Jan 2021 06:21  Phos  3.9     01-02  Mg     1.8     01-02    TPro  5.2<L>  /  Alb  2.9<L>  /  TBili  3.4<H>  /  DBili  x   /  AST  5191<H>  /  ALT  3114<H>  /  AlkPhos  96  01-02    PT/INR - ( 02 Jan 2021 06:21 )   PT: 21.8 sec;   INR: 1.87          PTT - ( 02 Jan 2021 06:21 )  PTT:37.0 sec  Urinalysis Basic - ( 31 Dec 2020 11:53 )    Color: Yellow / Appearance: Clear / SG: <=1.005 / pH: x  Gluc: x / Ketone: NEGATIVE  / Bili: NEGATIVE / Urobili: 0.2 E.U./dL   Blood: x / Protein: NEGATIVE mg/dL / Nitrite: NEGATIVE   Leuk Esterase: NEGATIVE / RBC: x / WBC x   Sq Epi: x / Non Sq Epi: x / Bacteria: x      CAPILLARY BLOOD GLUCOSE      POCT Blood Glucose.: 115 mg/dL (02 Jan 2021 06:11)      RADIOLOGY & ADDITIONAL TESTS: Reviewed.

## 2021-01-03 LAB
ALBUMIN SERPL ELPH-MCNC: 2.6 G/DL — LOW (ref 3.3–5)
ALP SERPL-CCNC: 103 U/L — SIGNIFICANT CHANGE UP (ref 40–120)
ALT FLD-CCNC: 2126 U/L — HIGH (ref 10–45)
ANION GAP SERPL CALC-SCNC: 12 MMOL/L — SIGNIFICANT CHANGE UP (ref 5–17)
ANION GAP SERPL CALC-SCNC: 12 MMOL/L — SIGNIFICANT CHANGE UP (ref 5–17)
ANION GAP SERPL CALC-SCNC: 14 MMOL/L — SIGNIFICANT CHANGE UP (ref 5–17)
ANION GAP SERPL CALC-SCNC: 9 MMOL/L — SIGNIFICANT CHANGE UP (ref 5–17)
ANISOCYTOSIS BLD QL: SLIGHT — SIGNIFICANT CHANGE UP
APTT BLD: 38.5 SEC — HIGH (ref 27.5–35.5)
AST SERPL-CCNC: 1937 U/L — HIGH (ref 10–40)
BASOPHILS # BLD AUTO: 0.14 K/UL — SIGNIFICANT CHANGE UP (ref 0–0.2)
BASOPHILS NFR BLD AUTO: 0.9 % — SIGNIFICANT CHANGE UP (ref 0–2)
BILIRUB SERPL-MCNC: 5 MG/DL — HIGH (ref 0.2–1.2)
BUN SERPL-MCNC: 14 MG/DL — SIGNIFICANT CHANGE UP (ref 7–23)
BUN SERPL-MCNC: 16 MG/DL — SIGNIFICANT CHANGE UP (ref 7–23)
BUN SERPL-MCNC: 18 MG/DL — SIGNIFICANT CHANGE UP (ref 7–23)
BUN SERPL-MCNC: 21 MG/DL — SIGNIFICANT CHANGE UP (ref 7–23)
BURR CELLS BLD QL SMEAR: PRESENT — SIGNIFICANT CHANGE UP
CALCIUM SERPL-MCNC: 7.9 MG/DL — LOW (ref 8.4–10.5)
CALCIUM SERPL-MCNC: 8.2 MG/DL — LOW (ref 8.4–10.5)
CALCIUM SERPL-MCNC: 8.2 MG/DL — LOW (ref 8.4–10.5)
CALCIUM SERPL-MCNC: 8.4 MG/DL — SIGNIFICANT CHANGE UP (ref 8.4–10.5)
CHLORIDE SERPL-SCNC: 101 MMOL/L — SIGNIFICANT CHANGE UP (ref 96–108)
CHLORIDE SERPL-SCNC: 101 MMOL/L — SIGNIFICANT CHANGE UP (ref 96–108)
CHLORIDE SERPL-SCNC: 99 MMOL/L — SIGNIFICANT CHANGE UP (ref 96–108)
CHLORIDE SERPL-SCNC: 99 MMOL/L — SIGNIFICANT CHANGE UP (ref 96–108)
CO2 SERPL-SCNC: 23 MMOL/L — SIGNIFICANT CHANGE UP (ref 22–31)
CO2 SERPL-SCNC: 23 MMOL/L — SIGNIFICANT CHANGE UP (ref 22–31)
CO2 SERPL-SCNC: 26 MMOL/L — SIGNIFICANT CHANGE UP (ref 22–31)
CO2 SERPL-SCNC: 26 MMOL/L — SIGNIFICANT CHANGE UP (ref 22–31)
CORTIS AM PEAK SERPL-MCNC: 16.3 UG/DL — SIGNIFICANT CHANGE UP (ref 3.9–37.5)
CREAT SERPL-MCNC: 1.36 MG/DL — HIGH (ref 0.5–1.3)
CREAT SERPL-MCNC: 1.45 MG/DL — HIGH (ref 0.5–1.3)
CREAT SERPL-MCNC: 1.52 MG/DL — HIGH (ref 0.5–1.3)
CREAT SERPL-MCNC: 1.67 MG/DL — HIGH (ref 0.5–1.3)
D DIMER BLD IA.RAPID-MCNC: HIGH NG/ML DDU
DACRYOCYTES BLD QL SMEAR: SIGNIFICANT CHANGE UP
EOSINOPHIL # BLD AUTO: 0 K/UL — SIGNIFICANT CHANGE UP (ref 0–0.5)
EOSINOPHIL NFR BLD AUTO: 0 % — SIGNIFICANT CHANGE UP (ref 0–6)
FIBRINOGEN PPP-MCNC: 305 MG/DL — SIGNIFICANT CHANGE UP (ref 258–438)
GIANT PLATELETS BLD QL SMEAR: PRESENT — SIGNIFICANT CHANGE UP
GLUCOSE BLDC GLUCOMTR-MCNC: 107 MG/DL — HIGH (ref 70–99)
GLUCOSE BLDC GLUCOMTR-MCNC: 114 MG/DL — HIGH (ref 70–99)
GLUCOSE BLDC GLUCOMTR-MCNC: 118 MG/DL — HIGH (ref 70–99)
GLUCOSE BLDC GLUCOMTR-MCNC: 124 MG/DL — HIGH (ref 70–99)
GLUCOSE SERPL-MCNC: 112 MG/DL — HIGH (ref 70–99)
GLUCOSE SERPL-MCNC: 123 MG/DL — HIGH (ref 70–99)
GLUCOSE SERPL-MCNC: 128 MG/DL — HIGH (ref 70–99)
GLUCOSE SERPL-MCNC: 131 MG/DL — HIGH (ref 70–99)
HAPTOGLOB SERPL-MCNC: 37 MG/DL — SIGNIFICANT CHANGE UP (ref 34–200)
HCT VFR BLD CALC: 24.7 % — LOW (ref 39–50)
HCT VFR BLD CALC: 24.9 % — LOW (ref 39–50)
HGB BLD-MCNC: 8.7 G/DL — LOW (ref 13–17)
HGB BLD-MCNC: 8.8 G/DL — LOW (ref 13–17)
HIV 1+2 AB+HIV1 P24 AG SERPL QL IA: SIGNIFICANT CHANGE UP
HYPOCHROMIA BLD QL: SLIGHT — SIGNIFICANT CHANGE UP
INR BLD: 1.36 — HIGH (ref 0.88–1.16)
LACTATE SERPL-SCNC: 2.7 MMOL/L — HIGH (ref 0.5–2)
LACTATE SERPL-SCNC: 3 MMOL/L — HIGH (ref 0.5–2)
LACTATE SERPL-SCNC: 3.4 MMOL/L — HIGH (ref 0.5–2)
LACTATE SERPL-SCNC: 3.8 MMOL/L — HIGH (ref 0.5–2)
LDH SERPL L TO P-CCNC: 645 U/L — HIGH (ref 50–242)
LYMPHOCYTES # BLD AUTO: 0 % — LOW (ref 13–44)
LYMPHOCYTES # BLD AUTO: 0 K/UL — LOW (ref 1–3.3)
MAGNESIUM SERPL-MCNC: 1.6 MG/DL — SIGNIFICANT CHANGE UP (ref 1.6–2.6)
MAGNESIUM SERPL-MCNC: 1.7 MG/DL — SIGNIFICANT CHANGE UP (ref 1.6–2.6)
MAGNESIUM SERPL-MCNC: 1.9 MG/DL — SIGNIFICANT CHANGE UP (ref 1.6–2.6)
MAGNESIUM SERPL-MCNC: 1.9 MG/DL — SIGNIFICANT CHANGE UP (ref 1.6–2.6)
MANUAL SMEAR VERIFICATION: SIGNIFICANT CHANGE UP
MCHC RBC-ENTMCNC: 29.8 PG — SIGNIFICANT CHANGE UP (ref 27–34)
MCHC RBC-ENTMCNC: 30.8 PG — SIGNIFICANT CHANGE UP (ref 27–34)
MCHC RBC-ENTMCNC: 34.9 GM/DL — SIGNIFICANT CHANGE UP (ref 32–36)
MCHC RBC-ENTMCNC: 35.6 GM/DL — SIGNIFICANT CHANGE UP (ref 32–36)
MCV RBC AUTO: 85.3 FL — SIGNIFICANT CHANGE UP (ref 80–100)
MCV RBC AUTO: 86.4 FL — SIGNIFICANT CHANGE UP (ref 80–100)
METAMYELOCYTES # FLD: 5.7 % — HIGH (ref 0–0)
MONOCYTES # BLD AUTO: 0.29 K/UL — SIGNIFICANT CHANGE UP (ref 0–0.9)
MONOCYTES NFR BLD AUTO: 1.9 % — LOW (ref 2–14)
MYELOCYTES NFR BLD: 2.8 % — HIGH (ref 0–0)
NEUTROPHILS # BLD AUTO: 13.66 K/UL — HIGH (ref 1.8–7.4)
NEUTROPHILS NFR BLD AUTO: 88.7 % — HIGH (ref 43–77)
NRBC # BLD: 0 /100 WBCS — SIGNIFICANT CHANGE UP (ref 0–0)
NRBC # BLD: 2 /100 — HIGH (ref 0–0)
NRBC # BLD: SIGNIFICANT CHANGE UP /100 WBCS (ref 0–0)
OVALOCYTES BLD QL SMEAR: SLIGHT — SIGNIFICANT CHANGE UP
PHOSPHATE SERPL-MCNC: 1.7 MG/DL — LOW (ref 2.5–4.5)
PHOSPHATE SERPL-MCNC: 1.9 MG/DL — LOW (ref 2.5–4.5)
PHOSPHATE SERPL-MCNC: 2.7 MG/DL — SIGNIFICANT CHANGE UP (ref 2.5–4.5)
PLAT MORPH BLD: ABNORMAL
PLATELET # BLD AUTO: 21 K/UL — LOW (ref 150–400)
PLATELET # BLD AUTO: 29 K/UL — LOW (ref 150–400)
POIKILOCYTOSIS BLD QL AUTO: SLIGHT — SIGNIFICANT CHANGE UP
POLYCHROMASIA BLD QL SMEAR: SLIGHT — SIGNIFICANT CHANGE UP
POTASSIUM SERPL-MCNC: 3.2 MMOL/L — LOW (ref 3.5–5.3)
POTASSIUM SERPL-MCNC: 3.5 MMOL/L — SIGNIFICANT CHANGE UP (ref 3.5–5.3)
POTASSIUM SERPL-MCNC: 3.7 MMOL/L — SIGNIFICANT CHANGE UP (ref 3.5–5.3)
POTASSIUM SERPL-MCNC: 3.8 MMOL/L — SIGNIFICANT CHANGE UP (ref 3.5–5.3)
POTASSIUM SERPL-SCNC: 3.2 MMOL/L — LOW (ref 3.5–5.3)
POTASSIUM SERPL-SCNC: 3.5 MMOL/L — SIGNIFICANT CHANGE UP (ref 3.5–5.3)
POTASSIUM SERPL-SCNC: 3.7 MMOL/L — SIGNIFICANT CHANGE UP (ref 3.5–5.3)
POTASSIUM SERPL-SCNC: 3.8 MMOL/L — SIGNIFICANT CHANGE UP (ref 3.5–5.3)
PROT SERPL-MCNC: 5.3 G/DL — LOW (ref 6–8.3)
PROTHROM AB SERPL-ACNC: 16.1 SEC — HIGH (ref 10.6–13.6)
RBC # BLD: 2.86 M/UL — LOW (ref 4.2–5.8)
RBC # BLD: 2.92 M/UL — LOW (ref 4.2–5.8)
RBC # FLD: 14.5 % — SIGNIFICANT CHANGE UP (ref 10.3–14.5)
RBC # FLD: 14.6 % — HIGH (ref 10.3–14.5)
RBC BLD AUTO: ABNORMAL
RETICS #: 25.8 K/UL — SIGNIFICANT CHANGE UP (ref 25–125)
RETICS/RBC NFR: 0.9 % — SIGNIFICANT CHANGE UP (ref 0.5–2.5)
SMUDGE CELLS # BLD: PRESENT — SIGNIFICANT CHANGE UP
SODIUM SERPL-SCNC: 134 MMOL/L — LOW (ref 135–145)
SODIUM SERPL-SCNC: 136 MMOL/L — SIGNIFICANT CHANGE UP (ref 135–145)
SODIUM SERPL-SCNC: 136 MMOL/L — SIGNIFICANT CHANGE UP (ref 135–145)
SODIUM SERPL-SCNC: 139 MMOL/L — SIGNIFICANT CHANGE UP (ref 135–145)
TARGETS BLD QL SMEAR: SLIGHT — SIGNIFICANT CHANGE UP
WBC # BLD: 15.4 K/UL — HIGH (ref 3.8–10.5)
WBC # BLD: 15.46 K/UL — HIGH (ref 3.8–10.5)
WBC # FLD AUTO: 15.4 K/UL — HIGH (ref 3.8–10.5)
WBC # FLD AUTO: 15.46 K/UL — HIGH (ref 3.8–10.5)

## 2021-01-03 PROCEDURE — 90945 DIALYSIS ONE EVALUATION: CPT

## 2021-01-03 PROCEDURE — 99291 CRITICAL CARE FIRST HOUR: CPT

## 2021-01-03 RX ORDER — MAGNESIUM SULFATE 500 MG/ML
2 VIAL (ML) INJECTION ONCE
Refills: 0 | Status: COMPLETED | OUTPATIENT
Start: 2021-01-03 | End: 2021-01-03

## 2021-01-03 RX ORDER — MAGNESIUM SULFATE 500 MG/ML
1 VIAL (ML) INJECTION ONCE
Refills: 0 | Status: COMPLETED | OUTPATIENT
Start: 2021-01-03 | End: 2021-01-03

## 2021-01-03 RX ORDER — POTASSIUM CHLORIDE 20 MEQ
40 PACKET (EA) ORAL ONCE
Refills: 0 | Status: COMPLETED | OUTPATIENT
Start: 2021-01-03 | End: 2021-01-03

## 2021-01-03 RX ORDER — POTASSIUM CHLORIDE 20 MEQ
40 PACKET (EA) ORAL ONCE
Refills: 0 | Status: DISCONTINUED | OUTPATIENT
Start: 2021-01-03 | End: 2021-01-03

## 2021-01-03 RX ADMIN — PHENYLEPHRINE HYDROCHLORIDE 1.13 MICROGRAM(S)/KG/MIN: 10 INJECTION INTRAVENOUS at 11:19

## 2021-01-03 RX ADMIN — Medication 40 MILLIEQUIVALENT(S): at 20:35

## 2021-01-03 RX ADMIN — PIPERACILLIN AND TAZOBACTAM 200 GRAM(S): 4; .5 INJECTION, POWDER, LYOPHILIZED, FOR SOLUTION INTRAVENOUS at 12:24

## 2021-01-03 RX ADMIN — CHLORHEXIDINE GLUCONATE 15 MILLILITER(S): 213 SOLUTION TOPICAL at 12:23

## 2021-01-03 RX ADMIN — Medication 105 MILLIGRAM(S): at 06:01

## 2021-01-03 RX ADMIN — PIPERACILLIN AND TAZOBACTAM 200 GRAM(S): 4; .5 INJECTION, POWDER, LYOPHILIZED, FOR SOLUTION INTRAVENOUS at 18:34

## 2021-01-03 RX ADMIN — Medication 100 GRAM(S): at 01:15

## 2021-01-03 RX ADMIN — PROPOFOL 1.8 MICROGRAM(S)/KG/MIN: 10 INJECTION, EMULSION INTRAVENOUS at 17:06

## 2021-01-03 RX ADMIN — Medication 105 MILLIGRAM(S): at 14:44

## 2021-01-03 RX ADMIN — PIPERACILLIN AND TAZOBACTAM 200 GRAM(S): 4; .5 INJECTION, POWDER, LYOPHILIZED, FOR SOLUTION INTRAVENOUS at 07:11

## 2021-01-03 RX ADMIN — PANTOPRAZOLE SODIUM 40 MILLIGRAM(S): 20 TABLET, DELAYED RELEASE ORAL at 12:23

## 2021-01-03 RX ADMIN — Medication 2.5 MILLIGRAM(S): at 12:23

## 2021-01-03 RX ADMIN — Medication 105 MILLIGRAM(S): at 21:25

## 2021-01-03 RX ADMIN — Medication 2.5 MILLIGRAM(S): at 18:34

## 2021-01-03 RX ADMIN — CHLORHEXIDINE GLUCONATE 15 MILLILITER(S): 213 SOLUTION TOPICAL at 21:25

## 2021-01-03 RX ADMIN — PROPOFOL 1.8 MICROGRAM(S)/KG/MIN: 10 INJECTION, EMULSION INTRAVENOUS at 08:33

## 2021-01-03 RX ADMIN — PANTOPRAZOLE SODIUM 40 MILLIGRAM(S): 20 TABLET, DELAYED RELEASE ORAL at 21:25

## 2021-01-03 RX ADMIN — Medication 50 GRAM(S): at 20:35

## 2021-01-03 RX ADMIN — Medication 1 MILLIGRAM(S): at 12:24

## 2021-01-03 NOTE — PROGRESS NOTE ADULT - SUBJECTIVE AND OBJECTIVE BOX
Overnight Events: No acute events.     Subjective: Patient seen and examined at bedside. Intubated and sedated. Unable to obtain ROS dt patients mental status.    Vital Signs:  T(F): , Max: 98.8 (01-03-21 @ 09:39)  HR:  (67 - 99)  BP:  (83/54 - 112/59)  BP(mean):  (61 - 80)  RR:  (12 - 26)  SpO2:  (100% - 100%)  Wt(kg): --  CVP(cm H2O): --  Mode: AC/ CMV (Assist Control/ Continuous Mandatory Ventilation), RR (machine): 14, RR (patient): 17, TV (machine): 450, FiO2: 40, PEEP: 5, ITime: 1, MAP: 8, PIP: 15    01-02 @ 07:01  -  01-03 @ 07:00  --------------------------------------------------------  IN: 2089.3 mL / OUT: 3080 mL / NET: -990.7 mL    01-03 @ 07:01 - 01-03 @ 17:51  --------------------------------------------------------  IN: 1325.2 mL / OUT: 1275 mL / NET: 50.2 mL      CAPILLARY BLOOD GLUCOSE      POCT Blood Glucose.: 107 mg/dL (03 Jan 2021 11:44)    Physical Exam:   General: Intubated and sedated on propofol  HEENT: NC/AT; PERRL, anicteric sclera; MMM  Neck: supple. L IJ in place c/di  Cardiovascular: +S1/S2; RRR  Respiratory: CTA B/L; no W/R/R; intubated  Gastrointestinal: soft, NT/ND; +BSx4  Extremities: R groin HD cath in place c/d/i. WWP; no edema, clubbing or cyanosis  Vascular: 2+ radial, DP/PT pulses B/L  Neurological: Sedated with propofol; responds to noxious stimuli    Medications:  MEDICATIONS  (STANDING):  chlorhexidine 0.12% Liquid 15 milliLiter(s) Oral Mucosa every 12 hours  chlorhexidine 4% Liquid 1 Application(s) Topical <User Schedule>  dextrose 40% Gel 15 Gram(s) Oral once  dextrose 5%. 1000 milliLiter(s) (50 mL/Hr) IV Continuous <Continuous>  dextrose 5%. 1000 milliLiter(s) (100 mL/Hr) IV Continuous <Continuous>  dextrose 50% Injectable 25 Gram(s) IV Push once  dextrose 50% Injectable 12.5 Gram(s) IV Push once  dextrose 50% Injectable 25 Gram(s) IV Push once  dextrose 50% Injectable 25 Gram(s) IV Push once  folic acid Injectable 1 milliGRAM(s) IV Push daily  glucagon  Injectable 1 milliGRAM(s) IntraMuscular once  insulin lispro (ADMELOG) corrective regimen sliding scale   SubCutaneous three times a day before meals  insulin lispro (ADMELOG) corrective regimen sliding scale   SubCutaneous at bedtime  metoprolol tartrate Injectable 2.5 milliGRAM(s) IV Push every 6 hours  norepinephrine Infusion 0.05 MICROgram(s)/kG/Min (5.63 mL/Hr) IV Continuous <Continuous>  pantoprazole  Injectable 40 milliGRAM(s) IV Push every 12 hours  phenylephrine    Infusion 0.1 MICROgram(s)/kG/Min (1.13 mL/Hr) IV Continuous <Continuous>  piperacillin/tazobactam IVPB.. 2.25 Gram(s) IV Intermittent every 6 hours  propofol Infusion 5 MICROgram(s)/kG/Min (1.8 mL/Hr) IV Continuous <Continuous>  thiamine IVPB 500 milliGRAM(s) IV Intermittent every 8 hours    MEDICATIONS  (PRN):      Allergies:  Allergies    No Known Allergies    Intolerances        Labs:                        8.7    15.40 )-----------( 21       ( 03 Jan 2021 05:25 )             24.9     01-03    139  |  101  |  16  ----------------------------<  128<H>  3.5   |  26  |  1.45<H>    Ca    8.4      03 Jan 2021 11:43  Phos  1.9     01-03  Mg     1.9     01-03    TPro  5.3<L>  /  Alb  2.6<L>  /  TBili  5.0<H>  /  DBili  x   /  AST  1937<H>  /  ALT  2126<H>  /  AlkPhos  103  01-03    PT/INR - ( 03 Jan 2021 09:20 )   PT: 16.1 sec;   INR: 1.36          PTT - ( 03 Jan 2021 09:20 )  PTT:38.5 sec      Radiology and other tests:

## 2021-01-03 NOTE — PROGRESS NOTE ADULT - ASSESSMENT
85M no reported PMHx who presented initially to [facility] ED after EMS found patient down on the street hypothermic to 77F and with altered mental status. Found to have ANTONELLA and hyperK. Nephrology consulted for ANTONELLA and hyperkalemia.      Assessment/Plan:   #hyperkalemia  #oligo-anuric ANTONELLA on CKD vs ANTONELLA   #transaminitis, shock liver   #coagulopathy  #thrombocytopenia   multi-organ failure, unclear precipitant   unclear baseline creatinine   etiology of ANTONELLA likely intra-renal ischemic ATN     Recommend:   continue with CVVHD net even fluid balance, 2K, 2L DFR,    q6h BMP Mg Phos Lactate while on CVVHD   repeat UA, urine lytes (Na, Cr, Urea, Osm) and urine protein-creatinine ratio   daily CXR   q6h BMP while on CVVHD   renal sono consistent with ANTONELLA   strict I/Os   renal diet     Patient was seen and evaluated on dialysis.     Hemodynamically stable.     CVVHD:   QB: 250 mL/min   Dialysis Fluid Rate: 2L/h   Prescribed UF set to achieve net even   Actual UF: 95mL/hr     Patient is tolerating the procedure well.   Continue CVVHD treatment as prescribed.    Thank you for the opportunity to participate in the care of your patient. The nephrology service remains available to assist with any questions or concerns. Please feel free to reach us by paging the on-call nephrology fellow for urgent issues or as below.     Ronnell Stout M.D.   PGY-4, Nephrology Fellow   C: [phone]   P: [phone]  85M no reported PMHx who presented initially to [facility] ED after EMS found patient down on the street hypothermic to 77F and with altered mental status. Found to have ANTONELLA and hyperK. Nephrology consulted for ANTONELLA and hyperkalemia.      Assessment/Plan:   #hyperkalemia  #oligo-anuric ANTONELLA on CKD vs ANTONELLA   #transaminitis, shock liver   #coagulopathy  #thrombocytopenia   multi-organ failure, unclear precipitant   unclear baseline creatinine   etiology of ANTONELLA likely intra-renal ischemic ATN     Recommend:   continue with CVVHD net even fluid balance, 2K, 2L DFR,    q6h BMP Mg Phos while on CVVHD   repeat UA, urine lytes (Na, Cr, Urea, Osm) and urine protein-creatinine ratio   daily CXR    renal sono consistent with ANTONELLA   strict I/Os   renal diet     Patient was seen and evaluated on dialysis.     Hemodynamically stable.     CVVHD:   QB: 250 mL/min   Dialysis Fluid Rate: 2L/h   Prescribed UF set to achieve net even   Actual UF: 95mL/hr     Patient is tolerating the procedure well.   Continue CVVHD treatment as prescribed.    Thank you for the opportunity to participate in the care of your patient. The nephrology service remains available to assist with any questions or concerns. Please feel free to reach us by paging the on-call nephrology fellow for urgent issues or as below.     Ronnell Stout M.D.   PGY-4, Nephrology Fellow   C: [phone]   P: [phone]

## 2021-01-03 NOTE — PROGRESS NOTE ADULT - SUBJECTIVE AND OBJECTIVE BOX
Patient is a 85y Male seen and evaluated at bedside.   remains on CVVHD, net even   tolerating well  BP acceptable  remains on pressors     Meds:    chlorhexidine 0.12% Liquid 15 every 12 hours  chlorhexidine 4% Liquid 1 <User Schedule>  CRRT Treatment  <Continuous>  dextrose 40% Gel 15 once  dextrose 5%. 1000 <Continuous>  dextrose 5%. 1000 <Continuous>  dextrose 50% Injectable 25 once  dextrose 50% Injectable 12.5 once  dextrose 50% Injectable 25 once  dextrose 50% Injectable 25 once  folic acid Injectable 1 daily  glucagon  Injectable 1 once  insulin lispro (ADMELOG) corrective regimen sliding scale  three times a day before meals  insulin lispro (ADMELOG) corrective regimen sliding scale  at bedtime  metoprolol tartrate Injectable 2.5 every 6 hours  norepinephrine Infusion 0.05 <Continuous>  pantoprazole  Injectable 40 every 12 hours  phenylephrine    Infusion 0.1 <Continuous>  piperacillin/tazobactam IVPB.. 2.25 every 6 hours  propofol Infusion 5 <Continuous>  PureFlow Dialysate RFP-400 (K 2 / Ca 3) 5000 <Continuous>  thiamine IVPB 500 every 8 hours      T(C): , Max: 37.1 (01-03-21 @ 09:39)  T(F): , Max: 98.8 (01-03-21 @ 09:39)  HR: 90 (01-03-21 @ 10:00)  BP: 96/56 (01-03-21 @ 10:00)  BP(mean): 70 (01-03-21 @ 10:00)  RR: 15 (01-03-21 @ 10:00)  SpO2: 100% (01-03-21 @ 10:00)  Wt(kg): --    01-02 @ 07:01  -  01-03 @ 07:00  --------------------------------------------------------  IN: 2089.3 mL / OUT: 3080 mL / NET: -990.7 mL    01-03 @ 07:01  -  01-03 @ 10:28  --------------------------------------------------------  IN: 436.6 mL / OUT: 356 mL / NET: 80.6 mL          Review of Systems:  unable to obtain    PHYSICAL EXAM:  GENERAL: intubated, sedated, FiO2 40%   CHEST/LUNG: Bilateral clear breath sounds  HEART: Regular rate and rhythm, no murmur, no gallops, no rub   ABDOMEN: Soft, nontender, non distended  EXTREMITIES: no pedal edema  ACCESS: R fem temp hemodialysis catheter       LABS:                        8.7    15.40 )-----------( 21       ( 03 Jan 2021 05:25 )             24.9     01-03    136  |  99  |  18  ----------------------------<  123<H>  3.7   |  23  |  1.52<H>    Ca    7.9<L>      03 Jan 2021 05:25  Phos  2.7     01-03  Mg     1.9     01-03    TPro  5.3<L>  /  Alb  2.6<L>  /  TBili  5.0<H>  /  DBili  x   /  AST  1937<H>  /  ALT  2126<H>  /  AlkPhos  103  01-03      PT/INR - ( 03 Jan 2021 09:20 )   PT: 16.1 sec;   INR: 1.36          PTT - ( 03 Jan 2021 09:20 )  PTT:38.5 sec    Sodium, Random Urine: 57 mmol/L (01-01 @ 15:34)  Creatinine, Random Urine: 25 mg/dL (01-01 @ 15:34)  Osmolality, Random Urine: 253 mosm/kg (01-01 @ 15:34)        RADIOLOGY & ADDITIONAL STUDIES:

## 2021-01-03 NOTE — PROGRESS NOTE ADULT - ASSESSMENT
86 yo M w/ unknown PMH BIBA to [facility] found hypothermic w/ AMS. Hypotensive on presentation w/ increasing pressor requirements. Hgb 5.8, Lactate 18, Abg w/ pH 7.19. Surgery consulted for concern for mesenteric ischemia given persistently elevated lactate and consistency of NGT output. Has no no episodes of melanotic stool, abd exam benign.    No acute surgical intervention at this time  Team 4C will sign off, please reconsult if needed  Plan discussed w/ attending and chief resident

## 2021-01-03 NOTE — CONSULT NOTE ADULT - ASSESSMENT
84 y/o man with no reported PMHx (possibly Afib, states not on any medications) who presented initially to [facility] ED after EMS found patient down on the street hypothermic to 77F and with altered mental status. Pt admitted to MICU for further evaluation and management of hypothermia, shock.     #) DIAGNOSIS  - Grade 4 thrombocytopenia, worsening   - Leukocytosis, likely reactive (left-shift)  - Normocytic anemia, stable  - Prolonged PT/PTT, improving   - Elevated D-Dimer 28531   - DIC 2/2 septic shock, resolved   - Shock liver, AHRF 2/2 aspiration pneumonia   - Possible coffee-ground residue on suction     #) PLAN  - s/p 2 U PRBC (12/31), 4 U Plasma (01/01), 1 U Cryoprecipitate (01/01)  - B12 WNL. HIV/HCV, TSH WNL. Currently on IV Thiamine/Folate. Serum drug screen positive for alcohol. No cirrhosis or hepatosplenomegaly noted on radiographic imaging.   - Peripheral blood smear shows smudge cells and no schistocytes. Send peripheral flow cytometry and blue top for platelets to rule-out pseudothrombocytopenia.   - Medical team to obtain home medications when able. Otherwise, in-hospital medications reviewed and are unlikely culprits for thrombocytopenia. Hold off on Aspirin and pharmacologic DVT ppx given platelet < 50 K.   - Patient has never received subcutaneous or intravenous heparin during course of hospitalization, making HIT very unlikely.   - Maintain active T+S, transfuse platelets if < 50 K with active bleed, < 20 K if febrile or < 10 K otherwise.   - Due to elevated D-Dimer, recommend US LE Venous Duplex bilaterally to rule-out consumptive process such as thrombus formation.   - Ongoing septic shock is likely cause of thrombocytopenia versus ITP. Will continue to monitor platelets for now, CBC with differential once daily.     - LDH downtrending from > 2500 --> 645. Haptoglobin previously undetectable on admission, now 37. Fibrinogen was < 10 on admission, now 305. This supports DIC in the setting of septic shock that resolved with 1 U cryoprecipitate and 2 U plasma. Ongoing TMA as a cause of thrombocytopenia is less likely. Trend LDH, haptoglobin and PT/INR/PTT daily.     86 y/o man with no reported PMHx (possibly Afib, states not on any medications) who presented initially to [facility] ED after EMS found patient down on the street hypothermic to 77F and with altered mental status. Pt admitted to MICU for further evaluation and management of hypothermia, shock.     #) DIAGNOSIS  - Grade 4 thrombocytopenia, worsening   - Leukocytosis, likely reactive (left-shift)  - Normocytic anemia, stable  - Prolonged PT/PTT, improving   - Elevated D-Dimer 77407   - DIC 2/2 septic shock, resolved   - Shock liver, AHRF 2/2 aspiration pneumonia   - Possible coffee-ground residue on suction     #) PLAN  - s/p 2 U PRBC (12/31), 4 U Plasma (01/01), 1 U Cryoprecipitate (01/01)  - B12 WNL. HIV/HCV, TSH WNL. Currently on IV Thiamine/Folate. Serum drug screen positive for alcohol. No cirrhosis or hepatosplenomegaly noted on radiographic imaging.   - Peripheral blood smear shows smudge cells and no schistocytes. Send peripheral flow cytometry and blue top for platelets to rule-out pseudothrombocytopenia.   - Medical team to obtain home medications when able. Otherwise, in-hospital medications reviewed and are unlikely culprits for thrombocytopenia. Hold off on Aspirin and pharmacologic DVT ppx given platelet < 50 K.   - Patient has never received subcutaneous or intravenous heparin during course of hospitalization, making HIT very unlikely.   - Maintain active T+S, transfuse platelets if < 50 K with active bleed, < 20 K if febrile or < 10 K otherwise.   - Due to elevated D-Dimer, recommend US LE Venous Duplex bilaterally to rule-out consumptive process such as thrombus formation.   - Ongoing septic shock is likely cause of thrombocytopenia versus ITP. Will continue to monitor platelets for now, CBC with differential once daily.   - Send direct Coomb's testing, anticardiolipin Ab, beta2 microglobulin, lupus anticoagulant, reticulocyte count.   - HLH should be a consideration given markedly elevated ferritin. Send triglyceride, CD25/IL-2, NK cell activity.       - LDH downtrending from > 2500 --> 645. Haptoglobin previously undetectable on admission, now 37. Fibrinogen was < 10 on admission, now 305. This supports DIC in the setting of septic shock that resolved with 1 U cryoprecipitate and 2 U plasma. Ongoing TMA as a cause of thrombocytopenia is less likely. Trend LDH, haptoglobin and PT/INR/PTT daily.

## 2021-01-03 NOTE — PROGRESS NOTE ADULT - ASSESSMENT
red mental status. Pt admitted to MICU for further evaluation and management of hypothermia, shock, and r/o ischemic bowel/ gastrointestinal hemorrhage.     NEURO  -intubated, sedated on propofol gtt    #Encephalopathy  Patient presented to [facility] with AMS. CTH noncontrast without acute bleed or fracture. Alcohol level elevated with hx of alcohol use. UTox negative  - pt initially with severe hypothermia, metabolic derangements, and shock likely contributing to altered mental status.   - currently intubated and sedated    CARDIOVASCULAR  #SHOCK  Pt found to be hypotensive to 80s/40s likely 2/2 to hemorrhagic/hypovolemic shock vs septic shock (unclear source) vs distributive (rewarming). Hemorrhagic source evidenced by Hgb drop from 8.2 to 5.9 with coffee grounds suctioned from sump placed. Pt with alerted mentation, low urine output, and cold to touch.   - levo transitioned to phenylephrine in setting of Afib with RVR  - volume resuscitation with blood products as well as intermittent fluid boluses  - UOP about 15-20cc/hr     #Afib with RVR  - Intermittently in Afib with RVR on 1/2   - levo transitioned to phenylephrine  - c/w lopressor 2.5 q6  - f/u TTE    PULMONARY  #Respiratory Failure  Pt not hypoxic but with tachypnea, increased WOB with use of accessory muscles. Pt intubated due to concern for eventual respiratory failure and air way protection  - Currently on VC/AC Vent Settings: 50/450/14/5    GASTROINTESTINAL  #r/o Bowel Ischemia  Pt with Hb 5.8 upon MICU arrival, with coffee ground output from sump. In the setting of shock and low Hb, and afib noted at [facility],   -CTA A/P performed with no intestinal bleed or mesenteric ischemia  -Surgery consulted. no surgical intervention at this time     #r/o Gastrointestinal Hemorrhage  See above  - c/w IV protonix gtt and octreotide gtt  - IV Protonix Q12H  - d/c octreotide drip due to no evidence of portal HTN or cirrhosis on imagining   - GI consulted, f/u recs    #Acute liver failure  LFTs > 7000, with coagulopathy and Utox negative. Acetaminophen level negative. Alcohol level 26. Hx of alcohol use. Liver failure possibly 2/2 to shock liver   - c/w NAC protocol  - Vitamin K adminstered for coagulopathy  - monitor INR   - continue to trend LFTs    RENAL  #Anion Gap Metabolic Acidosis  Likely 2/2 lactic acidosis. AG of 28 on presentation with pH 7.19, pCO2 15, bicarb 6 on arrival to MICU. Initiated on bicarb gtt here and s/p 2 amps bicarb  - bicarb gtt turned off  - f/u Q4H BMP    #Acute Renal Failure  Pt with sCr 2.93 on presentation (unknown baseline), improved s/p fluid boluses in ED. However now trending back up to 2.26  - renal sono consistent with ANTONELLA   - s/p HD cath placement 1/1  - continue with CVVHD net even fluid balance  - q6h BMP Mg Phos while on CVVHD   - repeat UA, urine lytes (Na, Cr, Urea, Osm) and urine protein-creatinine ratio   - daily CXR    - strict I/Os   - renal diet     #Hyperkalemia   K+5.5 on arrival at MICU with repeat 6.5. In the setting of ANTONELLA  - K now downtrending  - initiated Lokelma 10mg Q8H   - Q4H bmp    INFECTIOUS DISEASE  #LLL consolidation vs atelectasis  - will emprically cover for Zosyn due to patient being in shock  - MRSA swab negative    ENDOCRINE  #Hyperglycemia  Possibly in the setting of sepsis?   - insulin infusion following hyperglycemia protocol    HEME  #Normocytic Anemia   On presentation, patient with Hb 8.2, with repeat of 5.8 on MICU arrival. Now s/p 2u pRBC, with posttransfusion Hb 8.8  - s/p 2 U PRBC 12/31    #Thrombocytopenia   - Plt 70 on presentation-->20s Likely 2/2 DIC in setting of septic shock versus ITP  - since admission LDH downtrending, haptoglobin increasing, fibrinogen increasing s/p 1 U cryoprecipitate and 2 U FFP suppoting DIC in setting of septic shock   - Due to elevated D-Dimer will obtain US LE Venous Duplex bilaterally to r/o thrombus   - f/u peripheral flow cytometry and blue top for platelets to rule-out pseudothrombocytopenia  - Coomb's testing, anticardiolipin Ab, beta2 microglobulin, lupus anticoagulant, reticulocyte count.   - triglyceride, CD25/IL-2, NK cell activity.  - Trend LDH, haptoglobin and PT/INR/PTT daily.   - Maintain active T+S  - transfuse platelets if < 50 K with active bleed, < 20 K if febrile or < 10 K otherwise.     #Elevated INR  In the setting of acute liver failure. Repeat INR 2.39  - s/p IV vitamin K 10mg daily x 3 days per GI  - s/p 4 U FFP (01/01), 1 U Cryoprecipitate (01/01)  - Trend LDH, haptoglobin and PT/INR/PTT daily    MSK  #R pubic rim fracture  Noted on CTA abd/pelvis with adjacent hematoma without active signs of bleeding (per wet radiology read).   - Ortho consulted. currently no surgical intervention indicated at this time.      - indwelling mcbride in place    FLUIDS/ELECTROLYTES/NUTRITION  -IVF: none  -Monitor, Replete to K>4 and Mg>2  -Diet: NEPRO @ 20 cc  PROPHYLAXIS  -DVT: hold in the setting of possible bleed  -GI ppx: protonix BID    DISPO: MICU  CODE STATUS: FULL

## 2021-01-03 NOTE — PROGRESS NOTE ADULT - SUBJECTIVE AND OBJECTIVE BOX
SUBJECTIVE: The patient is intubated and sedated. had one BM yesterday, non-melanontic.     MEDICATIONS  (STANDING):  chlorhexidine 0.12% Liquid 15 milliLiter(s) Oral Mucosa every 12 hours  chlorhexidine 4% Liquid 1 Application(s) Topical <User Schedule>  CRRT Treatment    <Continuous>  dextrose 40% Gel 15 Gram(s) Oral once  dextrose 5%. 1000 milliLiter(s) (50 mL/Hr) IV Continuous <Continuous>  dextrose 5%. 1000 milliLiter(s) (100 mL/Hr) IV Continuous <Continuous>  dextrose 50% Injectable 25 Gram(s) IV Push once  dextrose 50% Injectable 12.5 Gram(s) IV Push once  dextrose 50% Injectable 25 Gram(s) IV Push once  dextrose 50% Injectable 25 Gram(s) IV Push once  folic acid Injectable 1 milliGRAM(s) IV Push daily  glucagon  Injectable 1 milliGRAM(s) IntraMuscular once  insulin lispro (ADMELOG) corrective regimen sliding scale   SubCutaneous three times a day before meals  insulin lispro (ADMELOG) corrective regimen sliding scale   SubCutaneous at bedtime  metoprolol tartrate Injectable 2.5 milliGRAM(s) IV Push every 6 hours  norepinephrine Infusion 0.05 MICROgram(s)/kG/Min (5.63 mL/Hr) IV Continuous <Continuous>  pantoprazole  Injectable 40 milliGRAM(s) IV Push every 12 hours  phenylephrine    Infusion 0.1 MICROgram(s)/kG/Min (1.13 mL/Hr) IV Continuous <Continuous>  piperacillin/tazobactam IVPB.. 2.25 Gram(s) IV Intermittent every 6 hours  propofol Infusion 5 MICROgram(s)/kG/Min (1.8 mL/Hr) IV Continuous <Continuous>  PureFlow Dialysate RFP-400 (K 2 / Ca 3) 5000 milliLiter(s) (2000 mL/Hr) CRRT <Continuous>  thiamine IVPB 500 milliGRAM(s) IV Intermittent every 8 hours    MEDICATIONS  (PRN):      LABS:                        8.7    15.40 )-----------( 21       ( 03 Jan 2021 05:25 )             24.9     01-03    136  |  99  |  18  ----------------------------<  123<H>  3.7   |  23  |  1.52<H>    Ca    7.9<L>      03 Jan 2021 05:25  Phos  2.7     01-03  Mg     1.9     01-03    TPro  5.3<L>  /  Alb  2.6<L>  /  TBili  5.0<H>  /  DBili  x   /  AST  1937<H>  /  ALT  2126<H>  /  AlkPhos  103  01-03    PT/INR - ( 03 Jan 2021 09:20 )   PT: 16.1 sec;   INR: 1.36          PTT - ( 03 Jan 2021 09:20 )  PTT:38.5 sec        Vital Signs Last 24 Hrs  T(C): 37.1 (03 Jan 2021 09:39), Max: 37.1 (03 Jan 2021 09:39)  T(F): 98.8 (03 Jan 2021 09:39), Max: 98.8 (03 Jan 2021 09:39)  HR: 94 (03 Jan 2021 09:40) (67 - 112)  BP: 90/57 (03 Jan 2021 09:30) (83/54 - 152/103)  BP(mean): 68 (03 Jan 2021 09:30) (61 - 124)  RR: 16 (03 Jan 2021 09:40) (12 - 30)  SpO2: 100% (03 Jan 2021 09:40) (96% - 100%)    I&O's Summary    02 Jan 2021 07:01  -  03 Jan 2021 07:00  --------------------------------------------------------  IN: 2089.3 mL / OUT: 3080 mL / NET: -990.7 mL    03 Jan 2021 07:01  -  03 Jan 2021 09:55  --------------------------------------------------------  IN: 280.8 mL / OUT: 261 mL / NET: 19.8 mL      I&O's Detail    02 Jan 2021 07:01  -  03 Jan 2021 07:00  --------------------------------------------------------  IN:    IV PiggyBack: 700 mL    Nepro: 400 mL    Norepinephrine: 69 mL    Phenylephrine: 282 mL    Phenylephrine: 372.7 mL    Propofol: 265.6 mL  Total IN: 2089.3 mL    OUT:    Indwelling Catheter - Urethral (mL): 30 mL    Norepinephrine: 0 mL    Other (mL): 3050 mL  Total OUT: 3080 mL    Total NET: -990.7 mL      03 Jan 2021 07:01  -  03 Jan 2021 09:55  --------------------------------------------------------  IN:    IV PiggyBack: 100 mL    IV PiggyBack: 5 mL    Nepro: 90 mL    Phenylephrine: 64.2 mL    Propofol: 21.6 mL  Total IN: 280.8 mL    OUT:    Indwelling Catheter - Urethral (mL): 0 mL    Other (mL): 261 mL  Total OUT: 261 mL    Total NET: 19.8 mL          Physical Exam:  General: NAD, resting in the bed  Pulmonary: intubated and sedated  Abdominal: soft, ND  Extremities: WWP, normal strength, no clubbing/cyanosis/edema   SUBJECTIVE: The patient is intubated and sedated. had one BM yesterday, non-melanontic.     MEDICATIONS  (STANDING):  chlorhexidine 0.12% Liquid 15 milliLiter(s) Oral Mucosa every 12 hours  chlorhexidine 4% Liquid 1 Application(s) Topical <User Schedule>  CRRT Treatment    <Continuous>  dextrose 40% Gel 15 Gram(s) Oral once  dextrose 5%. 1000 milliLiter(s) (50 mL/Hr) IV Continuous <Continuous>  dextrose 5%. 1000 milliLiter(s) (100 mL/Hr) IV Continuous <Continuous>  dextrose 50% Injectable 25 Gram(s) IV Push once  dextrose 50% Injectable 12.5 Gram(s) IV Push once  dextrose 50% Injectable 25 Gram(s) IV Push once  dextrose 50% Injectable 25 Gram(s) IV Push once  folic acid Injectable 1 milliGRAM(s) IV Push daily  glucagon  Injectable 1 milliGRAM(s) IntraMuscular once  insulin lispro (ADMELOG) corrective regimen sliding scale   SubCutaneous three times a day before meals  insulin lispro (ADMELOG) corrective regimen sliding scale   SubCutaneous at bedtime  metoprolol tartrate Injectable 2.5 milliGRAM(s) IV Push every 6 hours  norepinephrine Infusion 0.05 MICROgram(s)/kG/Min (5.63 mL/Hr) IV Continuous <Continuous>  pantoprazole  Injectable 40 milliGRAM(s) IV Push every 12 hours  phenylephrine    Infusion 0.1 MICROgram(s)/kG/Min (1.13 mL/Hr) IV Continuous <Continuous>  piperacillin/tazobactam IVPB.. 2.25 Gram(s) IV Intermittent every 6 hours  propofol Infusion 5 MICROgram(s)/kG/Min (1.8 mL/Hr) IV Continuous <Continuous>  PureFlow Dialysate RFP-400 (K 2 / Ca 3) 5000 milliLiter(s) (2000 mL/Hr) CRRT <Continuous>  thiamine IVPB 500 milliGRAM(s) IV Intermittent every 8 hours    MEDICATIONS  (PRN):      LABS:                        8.7    15.40 )-----------( 21       ( 03 Jan 2021 05:25 )             24.9     01-03    136  |  99  |  18  ----------------------------<  123<H>  3.7   |  23  |  1.52<H>    Ca    7.9<L>      03 Jan 2021 05:25  Phos  2.7     01-03  Mg     1.9     01-03    TPro  5.3<L>  /  Alb  2.6<L>  /  TBili  5.0<H>  /  DBili  x   /  AST  1937<H>  /  ALT  2126<H>  /  AlkPhos  103  01-03    PT/INR - ( 03 Jan 2021 09:20 )   PT: 16.1 sec;   INR: 1.36          PTT - ( 03 Jan 2021 09:20 )  PTT:38.5 sec        Vital Signs Last 24 Hrs  T(C): 37.1 (03 Jan 2021 09:39), Max: 37.1 (03 Jan 2021 09:39)  T(F): 98.8 (03 Jan 2021 09:39), Max: 98.8 (03 Jan 2021 09:39)  HR: 94 (03 Jan 2021 09:40) (67 - 112)  BP: 90/57 (03 Jan 2021 09:30) (83/54 - 152/103)  BP(mean): 68 (03 Jan 2021 09:30) (61 - 124)  RR: 16 (03 Jan 2021 09:40) (12 - 30)  SpO2: 100% (03 Jan 2021 09:40) (96% - 100%)    I&O's Summary    02 Jan 2021 07:01  -  03 Jan 2021 07:00  --------------------------------------------------------  IN: 2089.3 mL / OUT: 3080 mL / NET: -990.7 mL    03 Jan 2021 07:01  -  03 Jan 2021 09:55  --------------------------------------------------------  IN: 280.8 mL / OUT: 261 mL / NET: 19.8 mL      I&O's Detail    02 Jan 2021 07:01  -  03 Jan 2021 07:00  --------------------------------------------------------  IN:    IV PiggyBack: 700 mL    Nepro: 400 mL    Norepinephrine: 69 mL    Phenylephrine: 282 mL    Phenylephrine: 372.7 mL    Propofol: 265.6 mL  Total IN: 2089.3 mL    OUT:    Indwelling Catheter - Urethral (mL): 30 mL    Norepinephrine: 0 mL    Other (mL): 3050 mL  Total OUT: 3080 mL    Total NET: -990.7 mL      03 Jan 2021 07:01  -  03 Jan 2021 09:55  --------------------------------------------------------  IN:    IV PiggyBack: 100 mL    IV PiggyBack: 5 mL    Nepro: 90 mL    Phenylephrine: 64.2 mL    Propofol: 21.6 mL  Total IN: 280.8 mL    OUT:    Indwelling Catheter - Urethral (mL): 0 mL    Other (mL): 261 mL  Total OUT: 261 mL    Total NET: 19.8 mL          Physical Exam:  General: NAD, resting in the bed  Pulmonary: intubated and sedated  Abdominal: soft, ND, no rebound or guarding.  Extremities: WWP, normal strength, no clubbing/cyanosis/edema

## 2021-01-03 NOTE — CONSULT NOTE ADULT - SUBJECTIVE AND OBJECTIVE BOX
LENGTH OF HOSPITAL STAY: 3d    CHIEF COMPLAINT:   Patient is a 85y old  Male who presents with a chief complaint of AMS, hypothermic, (03 Jan 2021 10:28)    HISTORY OF PRESENTING ILLNESS:   86 y/o man with no reported PMHx possibly Afib? ( states not on any medications) who presented initially to [facility] ED after EMS found patient down on the street hypothermic to 77F and with altered mental status. Unfortunately pt is a poor historian was not able to verbalize any symptoms or sequence of events due to his critical condition. Based off the ED provier note, pt arrived to the ED alert , interactive but mumbling.     In the ED VS T: 86 F Rectally HR: 66 BP: 86/45 RR: ? Saturating 100% on RA . Labs remarkable for HGb 8.2, Plt 70, K 6.7, Mg 3.2, bicarb 12, lactate 15, anion gap 12, Cr 2.93, glucose 498. AST//324 VBG: pH 6.98 pcO2 30, po2 16, o2 sat 11.  CXR demonstrating severe dextro scoliosis. CT head demonstrating no acute intracranial pathology and frontal soft tissue hematoma. Pt received IV CTZ 1g x 1, Sodium Bicarb amp x 1 and started on infusion, calcium gluconate/dextrose/insulin, Zyprexa, and started on levophed and pt  was accepted to MICU for hypothermia and shock.     Upon arrival to the Medical ICU, pt was on NC but in respiratory distress using accessory muscles and belly breathing. Pt was mentating well but tachypneic to 35-40s. Pt was initially placed on BiPAP to decrease work of breathing but decision was made to intubate the patient for respiratory distress. Repeat labs on arrival demonstrated worsening anemia to 5.9, worsening thrombocytopenia, even lower bicarb, and coagulopathy. Central line and arterial line was placed. Sump inserted during time on intubation was placed to suction with coffee ground / dark brown output. Pt received 2 units of pRBC and remained on levophed. Pt  now sedated , intubated, and admitted to MICU for further evaluation and management of hypothermia, shock, and r/o gastrointestinal hemorrhage.  (31 Dec 2020 19:53)    PAST MEDICAL & SURGICAL HISTORY:  Medical history unknown  No significant past surgical history    SOCIAL HISTORY:  Unable to obtain    ALLERGIES:  No Known Allergies    MEDICATIONS:  STANDING MEDICATIONS  chlorhexidine 0.12% Liquid 15 milliLiter(s) Oral Mucosa every 12 hours  chlorhexidine 4% Liquid 1 Application(s) Topical <User Schedule>  CRRT Treatment    <Continuous>  dextrose 40% Gel 15 Gram(s) Oral once  dextrose 5%. 1000 milliLiter(s) IV Continuous <Continuous>  dextrose 5%. 1000 milliLiter(s) IV Continuous <Continuous>  dextrose 50% Injectable 25 Gram(s) IV Push once  dextrose 50% Injectable 12.5 Gram(s) IV Push once  dextrose 50% Injectable 25 Gram(s) IV Push once  dextrose 50% Injectable 25 Gram(s) IV Push once  folic acid Injectable 1 milliGRAM(s) IV Push daily  glucagon  Injectable 1 milliGRAM(s) IntraMuscular once  insulin lispro (ADMELOG) corrective regimen sliding scale   SubCutaneous three times a day before meals  insulin lispro (ADMELOG) corrective regimen sliding scale   SubCutaneous at bedtime  metoprolol tartrate Injectable 2.5 milliGRAM(s) IV Push every 6 hours  norepinephrine Infusion 0.05 MICROgram(s)/kG/Min IV Continuous <Continuous>  pantoprazole  Injectable 40 milliGRAM(s) IV Push every 12 hours  phenylephrine    Infusion 0.1 MICROgram(s)/kG/Min IV Continuous <Continuous>  piperacillin/tazobactam IVPB.. 2.25 Gram(s) IV Intermittent every 6 hours  propofol Infusion 5 MICROgram(s)/kG/Min IV Continuous <Continuous>  PureFlow Dialysate RFP-400 (K 2 / Ca 3) 5000 milliLiter(s) CRRT <Continuous>  thiamine IVPB 500 milliGRAM(s) IV Intermittent every 8 hours    PRN MEDICATIONS    VITALS:   T(F): 98.8  HR: 83  BP: 112/59  RR: 14  SpO2: 100%    LABS:                        8.7    15.40 )-----------( 21 ( 03 Jan 2021 05:25 )             24.9     01-03    139  |  101  |  16  ----------------------------<  128<H>  3.5   |  26  |  1.45<H>    Ca    8.4      03 Jan 2021 11:43  Phos  1.9     01-03  Mg     1.9     01-03    TPro  5.3<L>  /  Alb  2.6<L>  /  TBili  5.0<H>  /  DBili  x   /  AST  1937<H>  /  ALT  2126<H>  /  AlkPhos  103  01-03    PT/INR - ( 03 Jan 2021 09:20 )   PT: 16.1 sec;   INR: 1.36        PTT - ( 03 Jan 2021 09:20 )  PTT:38.5 sec    Lactate, Blood: 3.0 mmol/L <H> (01-03-21 @ 11:43)  Lactate, Blood: 3.4 mmol/L <H> (01-03-21 @ 05:25)  Lactate, Blood: 3.8 mmol/L <H> (01-03-21 @ 02:07)  Lactate, Blood: 4.8 mmol/L <HH> (01-02-21 @ 18:37)    Culture - Blood (collected 31 Dec 2020 17:39)  Source: .Blood Blood-Peripheral  Preliminary Report (01 Jan 2021 18:01):    No growth to date.    Culture - Blood (collected 31 Dec 2020 17:39)  Source: .Blood Blood-Peripheral  Preliminary Report (01 Jan 2021 18:01):    No growth to date.    RADIOLOGY:  < from: US Abdomen Doppler (01.01.21 @ 14:37) >  1.  No evidence of Budd-Chiari/portal hypertension. Normal hepatic and portal venous waveforms and directionality of flow.  2.  Hepatic steatosis.  3.  Incidental finding of dilated pancreatic duct measuring 5 mm. No pancreatic head mass or cholelithiasis was identified on prior CT. As clinically indicated, further imaging to include a pancreatic MRI may be of value if there is clinical concern for an occult pancreatic head neoplasm.    < end of copied text >    < from: US Retroperitoneal B-Scan Limited (01.01.21 @ 14:16) >  Mildly echogenic kidneys with trace perinephric fluid bilaterally, consistent with medical renal disease.  No hydronephrosis.    < end of copied text >    < from: CT Angio Chest w/ IV Cont (12.31.20 @ 23:34) >  1.  No evidence of active gastrointestinal bleeding. Mild hemoperitoneum in the right lower pelvic region.`    2.  Acute fractures of the right superior and inferior pubic ramus with associated 8 cm right pelvic hematoma. There is no evidence of active extravasation. Mild pelvic andright retroperitoneal hemoperitoneum which may be from extension of pelvic hematoma. Right obturator internus and externus intramuscular hematomas. Right sided pectoralis hematoma. No visceral organ injury is identified.    3.  Consolidative opacities in the left greater than right lower lobes, these may represent atelectasis or infection. Aspiration should also be considered. Central airways are patent.    < end of copied text >    < from: CT Head No Cont (12.31.20 @ 13:57) >  Left frontal scalp soft tissue swelling. No intracranial hemorrhage or calvarial fracture.    < end of copied text >    PHYSICAL EXAM:  GEN: No acute distress  HEENT:   LUNGS: Clear to auscultation bilaterally   HEART: S1/S2 present. RRR.   ABD: Soft, non-tender, non-distended. Bowel sounds present  EXT:  NEURO: AAOX3     LENGTH OF HOSPITAL STAY: 3d    CHIEF COMPLAINT:   Patient is a 85y old  Male who presents with a chief complaint of AMS, hypothermic, (03 Jan 2021 10:28)    HISTORY OF PRESENTING ILLNESS:   84 y/o man with no reported PMHx possibly Afib? ( states not on any medications) who presented initially to [facility] ED after EMS found patient down on the street hypothermic to 77F and with altered mental status. Unfortunately pt is a poor historian was not able to verbalize any symptoms or sequence of events due to his critical condition. Based off the ED provier note, pt arrived to the ED alert , interactive but mumbling.     In the ED VS T: 86 F Rectally HR: 66 BP: 86/45 RR: ? Saturating 100% on RA . Labs remarkable for HGb 8.2, Plt 70, K 6.7, Mg 3.2, bicarb 12, lactate 15, anion gap 12, Cr 2.93, glucose 498. AST//324 VBG: pH 6.98 pcO2 30, po2 16, o2 sat 11.  CXR demonstrating severe dextro scoliosis. CT head demonstrating no acute intracranial pathology and frontal soft tissue hematoma. Pt received IV CTZ 1g x 1, Sodium Bicarb amp x 1 and started on infusion, calcium gluconate/dextrose/insulin, Zyprexa, and started on levophed and pt  was accepted to MICU for hypothermia and shock.     Upon arrival to the Medical ICU, pt was on NC but in respiratory distress using accessory muscles and belly breathing. Pt was mentating well but tachypneic to 35-40s. Pt was initially placed on BiPAP to decrease work of breathing but decision was made to intubate the patient for respiratory distress. Repeat labs on arrival demonstrated worsening anemia to 5.9, worsening thrombocytopenia, even lower bicarb, and coagulopathy. Central line and arterial line was placed. Sump inserted during time on intubation was placed to suction with coffee ground / dark brown output. Pt received 2 units of pRBC and remained on levophed. Pt  now sedated , intubated, and admitted to MICU for further evaluation and management of hypothermia, shock, and r/o gastrointestinal hemorrhage.  (31 Dec 2020 19:53)    PAST MEDICAL & SURGICAL HISTORY:  Medical history unknown  No significant past surgical history    SOCIAL HISTORY:  Unable to obtain    ALLERGIES:  No Known Allergies    MEDICATIONS:  STANDING MEDICATIONS  chlorhexidine 0.12% Liquid 15 milliLiter(s) Oral Mucosa every 12 hours  chlorhexidine 4% Liquid 1 Application(s) Topical <User Schedule>  CRRT Treatment    <Continuous>  dextrose 40% Gel 15 Gram(s) Oral once  dextrose 5%. 1000 milliLiter(s) IV Continuous <Continuous>  dextrose 5%. 1000 milliLiter(s) IV Continuous <Continuous>  dextrose 50% Injectable 25 Gram(s) IV Push once  dextrose 50% Injectable 12.5 Gram(s) IV Push once  dextrose 50% Injectable 25 Gram(s) IV Push once  dextrose 50% Injectable 25 Gram(s) IV Push once  folic acid Injectable 1 milliGRAM(s) IV Push daily  glucagon  Injectable 1 milliGRAM(s) IntraMuscular once  insulin lispro (ADMELOG) corrective regimen sliding scale   SubCutaneous three times a day before meals  insulin lispro (ADMELOG) corrective regimen sliding scale   SubCutaneous at bedtime  metoprolol tartrate Injectable 2.5 milliGRAM(s) IV Push every 6 hours  norepinephrine Infusion 0.05 MICROgram(s)/kG/Min IV Continuous <Continuous>  pantoprazole  Injectable 40 milliGRAM(s) IV Push every 12 hours  phenylephrine    Infusion 0.1 MICROgram(s)/kG/Min IV Continuous <Continuous>  piperacillin/tazobactam IVPB.. 2.25 Gram(s) IV Intermittent every 6 hours  propofol Infusion 5 MICROgram(s)/kG/Min IV Continuous <Continuous>  PureFlow Dialysate RFP-400 (K 2 / Ca 3) 5000 milliLiter(s) CRRT <Continuous>  thiamine IVPB 500 milliGRAM(s) IV Intermittent every 8 hours    PRN MEDICATIONS    VITALS:   T(F): 98.8  HR: 83  BP: 112/59  RR: 14  SpO2: 100%    LABS:                        8.7    15.40 )-----------( 21 ( 03 Jan 2021 05:25 )             24.9     01-03    139  |  101  |  16  ----------------------------<  128<H>  3.5   |  26  |  1.45<H>    Ca    8.4      03 Jan 2021 11:43  Phos  1.9     01-03  Mg     1.9     01-03    TPro  5.3<L>  /  Alb  2.6<L>  /  TBili  5.0<H>  /  DBili  x   /  AST  1937<H>  /  ALT  2126<H>  /  AlkPhos  103  01-03    PT/INR - ( 03 Jan 2021 09:20 )   PT: 16.1 sec;   INR: 1.36        PTT - ( 03 Jan 2021 09:20 )  PTT:38.5 sec    Lactate, Blood: 3.0 mmol/L <H> (01-03-21 @ 11:43)  Lactate, Blood: 3.4 mmol/L <H> (01-03-21 @ 05:25)  Lactate, Blood: 3.8 mmol/L <H> (01-03-21 @ 02:07)  Lactate, Blood: 4.8 mmol/L <HH> (01-02-21 @ 18:37)    Culture - Blood (collected 31 Dec 2020 17:39)  Source: .Blood Blood-Peripheral  Preliminary Report (01 Jan 2021 18:01):    No growth to date.    Culture - Blood (collected 31 Dec 2020 17:39)  Source: .Blood Blood-Peripheral  Preliminary Report (01 Jan 2021 18:01):    No growth to date.    RADIOLOGY:  < from: US Abdomen Doppler (01.01.21 @ 14:37) >  1.  No evidence of Budd-Chiari/portal hypertension. Normal hepatic and portal venous waveforms and directionality of flow.  2.  Hepatic steatosis.  3.  Incidental finding of dilated pancreatic duct measuring 5 mm. No pancreatic head mass or cholelithiasis was identified on prior CT. As clinically indicated, further imaging to include a pancreatic MRI may be of value if there is clinical concern for an occult pancreatic head neoplasm.    < end of copied text >    < from: US Retroperitoneal B-Scan Limited (01.01.21 @ 14:16) >  Mildly echogenic kidneys with trace perinephric fluid bilaterally, consistent with medical renal disease.  No hydronephrosis.    < end of copied text >    < from: CT Angio Chest w/ IV Cont (12.31.20 @ 23:34) >  1.  No evidence of active gastrointestinal bleeding. Mild hemoperitoneum in the right lower pelvic region.`    2.  Acute fractures of the right superior and inferior pubic ramus with associated 8 cm right pelvic hematoma. There is no evidence of active extravasation. Mild pelvic andright retroperitoneal hemoperitoneum which may be from extension of pelvic hematoma. Right obturator internus and externus intramuscular hematomas. Right sided pectoralis hematoma. No visceral organ injury is identified.    3.  Consolidative opacities in the left greater than right lower lobes, these may represent atelectasis or infection. Aspiration should also be considered. Central airways are patent.    < end of copied text >    < from: CT Head No Cont (12.31.20 @ 13:57) >  Left frontal scalp soft tissue swelling. No intracranial hemorrhage or calvarial fracture.    < end of copied text >    PHYSICAL EXAM:  GEN: No acute distress, intubated, sedated, on feeding tube  HEENT: NCAT  LUNGS: Clear to auscultation bilaterally   HEART: S1/S2 present. RRR.   ABD: Soft, non-tender, non-distended. Bowel sounds present  EXT: No pitting edema  NEURO: Sedated

## 2021-01-03 NOTE — ED PROVIDER NOTE - CCCP TRG CHIEF CMPLNT
Alert-The patient is alert, awake and responds to voice. The patient is oriented to time, place, and person. The triage nurse is able to obtain subjective information. hypoglycemia/AMS

## 2021-01-04 LAB
ANION GAP SERPL CALC-SCNC: 11 MMOL/L — SIGNIFICANT CHANGE UP (ref 5–17)
APTT BLD: 36.8 SEC — HIGH (ref 27.5–35.5)
BASE EXCESS BLDA CALC-SCNC: -0.1 MMOL/L — SIGNIFICANT CHANGE UP (ref -2–3)
BASE EXCESS BLDV CALC-SCNC: 0.4 MMOL/L — SIGNIFICANT CHANGE UP
BLD GP AB SCN SERPL QL: NEGATIVE — SIGNIFICANT CHANGE UP
BUN SERPL-MCNC: 13 MG/DL — SIGNIFICANT CHANGE UP (ref 7–23)
CA-I SERPL-SCNC: 1.05 MMOL/L — LOW (ref 1.12–1.3)
CALCIUM SERPL-MCNC: 8.4 MG/DL — SIGNIFICANT CHANGE UP (ref 8.4–10.5)
CHLORIDE SERPL-SCNC: 101 MMOL/L — SIGNIFICANT CHANGE UP (ref 96–108)
CO2 SERPL-SCNC: 25 MMOL/L — SIGNIFICANT CHANGE UP (ref 22–31)
CREAT SERPL-MCNC: 1.26 MG/DL — SIGNIFICANT CHANGE UP (ref 0.5–1.3)
FOLATE SERPL-MCNC: 6 NG/ML — SIGNIFICANT CHANGE UP
FOLATE SERPL-MCNC: 6.6 NG/ML — SIGNIFICANT CHANGE UP
GAS PNL BLDV: 131 MMOL/L — LOW (ref 138–146)
GAS PNL BLDV: SIGNIFICANT CHANGE UP
GLUCOSE BLDC GLUCOMTR-MCNC: 115 MG/DL — HIGH (ref 70–99)
GLUCOSE BLDC GLUCOMTR-MCNC: 130 MG/DL — HIGH (ref 70–99)
GLUCOSE BLDC GLUCOMTR-MCNC: 87 MG/DL — SIGNIFICANT CHANGE UP (ref 70–99)
GLUCOSE BLDC GLUCOMTR-MCNC: 96 MG/DL — SIGNIFICANT CHANGE UP (ref 70–99)
GLUCOSE SERPL-MCNC: 127 MG/DL — HIGH (ref 70–99)
HAPTOGLOB SERPL-MCNC: 59 MG/DL — SIGNIFICANT CHANGE UP (ref 34–200)
HCO3 BLDA-SCNC: 24 MMOL/L — SIGNIFICANT CHANGE UP (ref 21–28)
HCO3 BLDV-SCNC: 25 MMOL/L — SIGNIFICANT CHANGE UP (ref 20–27)
HCT VFR BLD CALC: 25.6 % — LOW (ref 39–50)
HGB BLD-MCNC: 8.6 G/DL — LOW (ref 13–17)
INR BLD: 1.14 — SIGNIFICANT CHANGE UP (ref 0.88–1.16)
LACTATE SERPL-SCNC: 2.4 MMOL/L — HIGH (ref 0.5–2)
LDH SERPL L TO P-CCNC: 511 U/L — HIGH (ref 50–242)
MAGNESIUM SERPL-MCNC: 2.2 MG/DL — SIGNIFICANT CHANGE UP (ref 1.6–2.6)
MAGNESIUM SERPL-MCNC: 2.3 MG/DL — SIGNIFICANT CHANGE UP (ref 1.6–2.6)
MCHC RBC-ENTMCNC: 29.3 PG — SIGNIFICANT CHANGE UP (ref 27–34)
MCHC RBC-ENTMCNC: 33.6 GM/DL — SIGNIFICANT CHANGE UP (ref 32–36)
MCV RBC AUTO: 87.1 FL — SIGNIFICANT CHANGE UP (ref 80–100)
NRBC # BLD: 0 /100 WBCS — SIGNIFICANT CHANGE UP (ref 0–0)
PCO2 BLDA: 36 MMHG — SIGNIFICANT CHANGE UP (ref 35–48)
PCO2 BLDV: 42 MMHG — SIGNIFICANT CHANGE UP (ref 41–51)
PH BLDA: 7.44 — SIGNIFICANT CHANGE UP (ref 7.35–7.45)
PH BLDV: 7.4 — SIGNIFICANT CHANGE UP (ref 7.32–7.43)
PHOSPHATE SERPL-MCNC: 1.4 MG/DL — LOW (ref 2.5–4.5)
PHOSPHATE SERPL-MCNC: 2.1 MG/DL — LOW (ref 2.5–4.5)
PLATELET # BLD AUTO: 25 K/UL — LOW (ref 150–400)
PO2 BLDA: 125 MMHG — HIGH (ref 83–108)
PO2 BLDV: 39 MMHG — SIGNIFICANT CHANGE UP
POTASSIUM BLDV-SCNC: 3.5 MMOL/L — SIGNIFICANT CHANGE UP (ref 3.5–4.9)
POTASSIUM SERPL-MCNC: 3.5 MMOL/L — SIGNIFICANT CHANGE UP (ref 3.5–5.3)
POTASSIUM SERPL-SCNC: 3.5 MMOL/L — SIGNIFICANT CHANGE UP (ref 3.5–5.3)
PROTHROM AB SERPL-ACNC: 13.6 SEC — SIGNIFICANT CHANGE UP (ref 10.6–13.6)
RBC # BLD: 2.97 M/UL — LOW (ref 4.2–5.8)
RBC # BLD: 2.97 M/UL — LOW (ref 4.2–5.8)
RBC # FLD: 14.6 % — HIGH (ref 10.3–14.5)
RETICS #: 34.7 K/UL — SIGNIFICANT CHANGE UP (ref 25–125)
RETICS/RBC NFR: 1.2 % — SIGNIFICANT CHANGE UP (ref 0.5–2.5)
RH IG SCN BLD-IMP: POSITIVE — SIGNIFICANT CHANGE UP
SAO2 % BLDA: 99 % — SIGNIFICANT CHANGE UP (ref 95–100)
SAO2 % BLDV: 71 % — SIGNIFICANT CHANGE UP
SODIUM SERPL-SCNC: 137 MMOL/L — SIGNIFICANT CHANGE UP (ref 135–145)
TRIGL SERPL-MCNC: 337 MG/DL — HIGH
WBC # BLD: 11.72 K/UL — HIGH (ref 3.8–10.5)
WBC # FLD AUTO: 11.72 K/UL — HIGH (ref 3.8–10.5)

## 2021-01-04 PROCEDURE — 93306 TTE W/DOPPLER COMPLETE: CPT | Mod: 26

## 2021-01-04 PROCEDURE — 99358 PROLONG SERVICE W/O CONTACT: CPT | Mod: 25

## 2021-01-04 PROCEDURE — 99223 1ST HOSP IP/OBS HIGH 75: CPT

## 2021-01-04 PROCEDURE — 71045 X-RAY EXAM CHEST 1 VIEW: CPT | Mod: 26,77

## 2021-01-04 PROCEDURE — 99233 SBSQ HOSP IP/OBS HIGH 50: CPT | Mod: GC

## 2021-01-04 PROCEDURE — 71045 X-RAY EXAM CHEST 1 VIEW: CPT | Mod: 26

## 2021-01-04 PROCEDURE — 99291 CRITICAL CARE FIRST HOUR: CPT

## 2021-01-04 RX ORDER — HYDROCORTISONE 20 MG
100 TABLET ORAL EVERY 8 HOURS
Refills: 0 | Status: DISCONTINUED | OUTPATIENT
Start: 2021-01-04 | End: 2021-01-06

## 2021-01-04 RX ADMIN — Medication 2.5 MILLIGRAM(S): at 06:20

## 2021-01-04 RX ADMIN — Medication 5.63 MICROGRAM(S)/KG/MIN: at 10:27

## 2021-01-04 RX ADMIN — PIPERACILLIN AND TAZOBACTAM 200 GRAM(S): 4; .5 INJECTION, POWDER, LYOPHILIZED, FOR SOLUTION INTRAVENOUS at 00:30

## 2021-01-04 RX ADMIN — PANTOPRAZOLE SODIUM 40 MILLIGRAM(S): 20 TABLET, DELAYED RELEASE ORAL at 10:27

## 2021-01-04 RX ADMIN — PROPOFOL 1.8 MICROGRAM(S)/KG/MIN: 10 INJECTION, EMULSION INTRAVENOUS at 07:20

## 2021-01-04 RX ADMIN — PROPOFOL 1.8 MICROGRAM(S)/KG/MIN: 10 INJECTION, EMULSION INTRAVENOUS at 23:35

## 2021-01-04 RX ADMIN — PIPERACILLIN AND TAZOBACTAM 200 GRAM(S): 4; .5 INJECTION, POWDER, LYOPHILIZED, FOR SOLUTION INTRAVENOUS at 06:13

## 2021-01-04 RX ADMIN — Medication 100 MILLIGRAM(S): at 16:33

## 2021-01-04 RX ADMIN — PHENYLEPHRINE HYDROCHLORIDE 1.13 MICROGRAM(S)/KG/MIN: 10 INJECTION INTRAVENOUS at 00:50

## 2021-01-04 RX ADMIN — CHLORHEXIDINE GLUCONATE 15 MILLILITER(S): 213 SOLUTION TOPICAL at 22:25

## 2021-01-04 RX ADMIN — PROPOFOL 1.8 MICROGRAM(S)/KG/MIN: 10 INJECTION, EMULSION INTRAVENOUS at 00:21

## 2021-01-04 RX ADMIN — CHLORHEXIDINE GLUCONATE 15 MILLILITER(S): 213 SOLUTION TOPICAL at 10:12

## 2021-01-04 RX ADMIN — PANTOPRAZOLE SODIUM 40 MILLIGRAM(S): 20 TABLET, DELAYED RELEASE ORAL at 22:25

## 2021-01-04 RX ADMIN — PIPERACILLIN AND TAZOBACTAM 200 GRAM(S): 4; .5 INJECTION, POWDER, LYOPHILIZED, FOR SOLUTION INTRAVENOUS at 18:31

## 2021-01-04 RX ADMIN — Medication 1 MILLIGRAM(S): at 11:34

## 2021-01-04 RX ADMIN — Medication 100 MILLIGRAM(S): at 22:25

## 2021-01-04 RX ADMIN — PIPERACILLIN AND TAZOBACTAM 200 GRAM(S): 4; .5 INJECTION, POWDER, LYOPHILIZED, FOR SOLUTION INTRAVENOUS at 23:05

## 2021-01-04 RX ADMIN — PIPERACILLIN AND TAZOBACTAM 200 GRAM(S): 4; .5 INJECTION, POWDER, LYOPHILIZED, FOR SOLUTION INTRAVENOUS at 11:34

## 2021-01-04 NOTE — PROCEDURE NOTE - NSSITEPREP_SKIN_A_CORE
chlorhexidine/Adherence to aseptic technique: hand hygiene prior to donning barriers (gown, gloves), don cap and mask, sterile drape over patient
chlorhexidine

## 2021-01-04 NOTE — PROCEDURE NOTE - NSPROCNAME_GEN_A_CORE
Central Line Insertion
Gastric Intubation/Gastric Lavage
Tracheal Intubation
Arterial Puncture/Cannulation
Central Line Insertion
Central Line Insertion

## 2021-01-04 NOTE — CONSULT NOTE ADULT - ASSESSMENT
85 M with acute respiratory failure, septic shock, functional quadriplegia, encounter for palliative care.

## 2021-01-04 NOTE — PROCEDURE NOTE - NSPROCDETAILS_GEN_ALL_CORE
guidewire recovered/lumen(s) aspirated and flushed/sterile dressing applied/sterile technique, catheter placed/ultrasound guidance with use of sterile gel and probe cove
orogastric/gastric secretions aspirated, placement confirmed/connected to suction
patient pre-oxygenated, tube inserted, placement confirmed
US guidance/location identified, draped/prepped, sterile technique used, needle inserted/introduced/positive blood return obtained via catheter/connected to a pressurized flush line/sutured in place/hemostasis with direct pressure, dressing applied/Seldinger technique/all materials/supplies accounted for at end of procedure
US guidance/guidewire recovered/lumen(s) aspirated and flushed/sterile dressing applied/sterile technique, catheter placed/ultrasound guidance with use of sterile gel and probe cove
guidewire recovered/lumen(s) aspirated and flushed/sterile dressing applied/sterile technique, catheter placed/ultrasound guidance with use of sterile gel and probe cove

## 2021-01-04 NOTE — PROCEDURE NOTE - NSPOSTPRCRAD_GEN_A_CORE
post-procedure radiography performed
Central line located in left sided superior vena cava
central line located in the superior vena cava/post-procedure radiography performed

## 2021-01-04 NOTE — CONSULT NOTE ADULT - PROBLEM SELECTOR RECOMMENDATION 2
- Patient remains on Levophed gtt.  - Evidence of end organ dysfunction, including shock liver.  - Supportive care.

## 2021-01-04 NOTE — PROGRESS NOTE ADULT - ASSESSMENT
86 y/o man with no reported PMHx (possibly Afib, states not on any medications) who presented initially to [facility] ED after EMS found patient down on the street hypothermic to 77F and with altered mental status. Pt admitted to MICU for further evaluation and management of hypothermia, shock, and r/o ischemic bowel/ gastrointestinal hemorrhage.     NEURO  -intubated, sedated on propofol gtt    #Encephalopathy  Patient presented to [facility] with AMS. CTH noncontrast without acute bleed or fracture. Alcohol level elevated with hx of alcohol use. UTox negative  - pt initially with severe hypothermia, metabolic derangements, and shock likely contributing to altered mental status.   - currently intubated and sedated    CARDIOVASCULAR  #SHOCK  Pt found to be hypotensive to 80s/40s likely 2/2 to hemorrhagic/hypovolemic shock vs septic shock (unclear source) vs distributive (rewarming). Hemorrhagic source evidenced by Hgb drop from 8.2 to 5.9 with coffee grounds suctioned from sump placed. Pt with alerted mentation, low urine output, and cold to touch.   - levo transitioned to phenylephrine in setting of Afib with RVR  - s/p volume resuscitation with blood products as well as intermittent fluid boluses    #Afib with RVR  - Intermittently in Afib with RVR on 1/2   - phenyelphrine transitioned to levo  - c/w lopressor 2.5 q6  - SOSA w/ Grade I left ventricular diastolic dysfunction, Aortic sclerosis without significant stenosis, Trace aortic regurgitation, Moderate tricuspid regurgitation.    PULMONARY  #Respiratory Failure  Pt not hypoxic but with tachypnea, increased WOB with use of accessory muscles. Pt intubated due to concern for eventual respiratory failure and air way protection  - Currently on vent     GASTROINTESTINAL  #r/o Bowel Ischemia  Pt with Hb 5.8 upon MICU arrival, with coffee ground output from sump. In the setting of shock and low Hb, and afib noted at [facility],   -CTA A/P performed with no intestinal bleed or mesenteric ischemia  -Surgery consulted. no surgical intervention at this time     #r/o Gastrointestinal Hemorrhage  See above  - c/w IV protonix gtt and octreotide gtt  - IV Protonix Q12H  - d/c octreotide drip due to no evidence of portal HTN or cirrhosis on imagining   - GI consulted, f/u recs    #Acute liver failure  LFTs > 7000, with coagulopathy and Utox negative. Acetaminophen level negative. Alcohol level 26. Hx of alcohol use. Liver failure possibly 2/2 to shock liver   - c/w NAC protocol  - Vitamin K administered for coagulopathy  - monitor INR   - continue to trend LFTs    RENAL  #Anion Gap Metabolic Acidosis  Likely 2/2 lactic acidosis. AG of 28 on presentation with pH 7.19, pCO2 15, bicarb 6 on arrival to MICU. Initiated on bicarb gtt here and s/p 2 amps bicarb  - RESOLVED  - f/u Q4H BMP    #Acute Renal Failure  Pt with sCr 2.93 on presentation (unknown baseline), improved s/p fluid boluses in ED. 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 In the setting of ANTONELLA  - Resolved   - trend  bmp    INFECTIOUS DISEASE  #LLL consolidation vs atelectasis  - will emprically cover w/ Zosyn due to patient being in shock  - MRSA swab negative    ENDOCRINE  #Hyperglycemia  Possibly in the setting of sepsis?   -now resolved    HEME  #Normocytic Anemia   On presentation, patient with Hb 8.2, with repeat of 5.8 on MICU arrival. Now s/p 2u pRBC, with posttransfusion Hb 8.8  - s/p 2 U PRBC 12/31    #Thrombocytopenia   - Plt 70 on presentation-->20s Likely 2/2 DIC in setting of septic shock versus ITP  - since admission LDH downtrending, haptoglobin increasing, fibrinogen increasing s/p 1 U cryoprecipitate and 2 U FFP supporting DIC in setting of septic shock   - Due to elevated D-Dimer will obtain US LE Venous Duplex bilaterally to r/o thrombus   - f/u peripheral flow cytometry and blue top for platelets to rule-out pseudothrombocytopenia  - Coomb's testing, anticardiolipin Ab, beta2 microglobulin, lupus anticoagulant, reticulocyte count.   - triglyceride, CD25/IL-2, NK cell activity.  - Trend LDH, haptoglobin and PT/INR/PTT daily.   - Maintain active T+S  - transfuse platelets if < 50 K with active bleed, < 20 K if febrile or < 10 K otherwise.     #Elevated INR  In the setting of acute liver failure. Repeat INR 2.39  - s/p IV vitamin K 10mg daily x 3 days per GI  - s/p 4 U FFP (01/01), 1 U Cryoprecipitate (01/01)  - Trend LDH, haptoglobin and PT/INR/PTT daily      MSK  #R pubic rim fracture  Noted on CTA abd/pelvis with adjacent hematoma without active signs of bleeding (per wet radiology read).   - Ortho consulted. currently no surgical intervention indicated at this time.      - indwelling mcbride in place    RHEUM  - per collateral obtained by PCP patient w/ hx of RA on prednisone 10 mg   - c/w hydrocortisone 100 mg q8     FLUIDS/ELECTROLYTES/NUTRITION  -IVF: none  -Monitor, Replete to K>4 and Mg>2  -Diet: jevity 1.5  PROPHYLAXIS  -DVT: SCDs  -GI ppx: protonix BID    DISPO: MICU  CODE STATUS: FULL

## 2021-01-04 NOTE — PROCEDURE NOTE - ADDITIONAL PROCEDURE DETAILS
CXR post procedure showed no pneumothorax but HD catheter that coursed along the left side of the aortic knob and towards the left side of heart. 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 Blood gas studies done through new line showed findings consistent with venous blood, and transducer tracing was consistent with central venous flow as well.

## 2021-01-04 NOTE — PROGRESS NOTE ADULT - SUBJECTIVE AND OBJECTIVE BOX
Patient is a 85y Male seen and evaluated at bedside.   remains oligo-anuric on CVVHD, net even, tolerating well  BP acceptable  remains on pressors   oxygen requirements stable   net ~even/24h     Meds:    chlorhexidine 0.12% Liquid 15 every 12 hours  chlorhexidine 4% Liquid 1 <User Schedule>  CRRT Treatment  <Continuous>  dextrose 40% Gel 15 once  dextrose 5%. 1000 <Continuous>  dextrose 5%. 1000 <Continuous>  dextrose 50% Injectable 25 once  dextrose 50% Injectable 25 once  dextrose 50% Injectable 12.5 once  dextrose 50% Injectable 25 once  folic acid Injectable 1 daily  glucagon  Injectable 1 once  insulin lispro (ADMELOG) corrective regimen sliding scale  three times a day before meals  insulin lispro (ADMELOG) corrective regimen sliding scale  at bedtime  metoprolol tartrate Injectable 2.5 every 6 hours  norepinephrine Infusion 0.05 <Continuous>  pantoprazole  Injectable 40 every 12 hours  phenylephrine    Infusion 0.1 <Continuous>  Phoxillum Filtration BK 4 / 2.5 5000 <Continuous>  piperacillin/tazobactam IVPB.. 2.25 every 6 hours  propofol Infusion 5 <Continuous>      T(C): , Max: 36.7 (01-03-21 @ 21:56)  T(F): , Max: 98 (01-03-21 @ 21:56)  HR: 65 (01-04-21 @ 12:00)  BP: 103/73 (01-04-21 @ 10:00)  BP(mean): 80 (01-04-21 @ 10:00)  RR: 17 (01-04-21 @ 12:00)  SpO2: 100% (01-04-21 @ 12:00)  Wt(kg): --    01-03 @ 07:01  -  01-04 @ 07:00  --------------------------------------------------------  IN: 3100.8 mL / OUT: 3079 mL / NET: 21.8 mL    01-04 @ 07:01  -  01-04 @ 12:28  --------------------------------------------------------  IN: 322.9 mL / OUT: 317 mL / NET: 5.9 mL          Review of Systems:  unable to obtain    PHYSICAL EXAM:  GENERAL: intubated, sedated, FiO2 40%   CHEST/LUNG: Bilateral clear breath sounds  HEART: Regular rate and rhythm, no murmur, no gallops, no rub   ABDOMEN: Soft, nontender, non distended  EXTREMITIES: no pedal edema  ACCESS: R fem temp hemodialysis catheter       LABS:                        8.6    11.72 )-----------( 25       ( 04 Jan 2021 06:06 )             25.6     01-04    137  |  101  |  13  ----------------------------<  127<H>  3.5   |  25  |  1.26    Ca    8.4      04 Jan 2021 00:22  Phos  2.1     01-04  Mg     2.2     01-04    TPro  5.3<L>  /  Alb  2.6<L>  /  TBili  5.0<H>  /  DBili  x   /  AST  1937<H>  /  ALT  2126<H>  /  AlkPhos  103  01-03      PT/INR - ( 04 Jan 2021 06:06 )   PT: 13.6 sec;   INR: 1.14          PTT - ( 04 Jan 2021 06:06 )  PTT:36.8 sec          RADIOLOGY & ADDITIONAL STUDIES:

## 2021-01-04 NOTE — CHART NOTE - NSCHARTNOTEFT_GEN_A_CORE
PALLIATIVE MEDICINE COORDINATION OF CARE NOTE FOR ARIADNA BELL  [  ] ED Trigger   [x  ] MICU Trigger     [  ] Consult    Patient last assessed: _1/4/21____  to manage: GOC/AD, Symptoms, and Support was recommended:___1/4/21___    __30____ Minutes; Start: __0900___  End: __0930__, of non-face-to-face prolonged service provided that relates to (face-to-face) care that has or will occur and ongoing patient management, including one or more of the following:   - Reviewed records from other physicians or other health care professional services, including one or more of the following: other medical records and diagnostic / radiology study results     HPI:  86 y/o man with no reported PMHx possibly Afib? ( states not on any medications) who presented initially to [facility] ED after EMS found patient down on the street hypothermic to 77F and with altered mental status. Unfortunately pt is a poor historian was not able to verbalize any symptoms or sequence of events due to his critical condition. Based off the ED provier note, pt arrived to the ED alert , interactive but mumbling.     In the ED VS T: 86 F Rectally HR: 66 BP: 86/45 RR: ? Saturating 100% on RA . Labs remarkable for HGb 8.2, Plt 70, K 6.7, Mg 3.2, bicarb 12, lactate 15, anion gap 12, Cr 2.93, glucose 498. AST//324 VBG: pH 6.98 pcO2 30, po2 16, o2 sat 11.  CXR demonstrating severe dextro scoliosis. CT head demonstrating no acute intracranial pathology and frontal soft tissue hematoma. Pt received IV CTZ 1g x 1, Sodium Bicarb amp x 1 and started on infusion, calcium gluconate/dextrose/insulin, Zyprexa, and started on levophed and pt  was accepted to MICU for hypothermia and shock.     Upon arrival to the Medical ICU, pt was on NC but in respiratory distress using accessory muscles and belly breathing. Pt was mentating well but tachypneic to 35-40s. Pt was initially placed on BiPAP to decrease work of breathing but decision was made to intubate the patient for respiratory distress. Repeat labs on arrival demonstrated worsening anemia to 5.9, worsening thrombocytopenia, even lower bicarb, and coagulopathy. Central line and arterial line was placed. Sump inserted during time on intubation was placed to suction with coffee ground / dark brown output. Pt received 2 units of pRBC and remained on levophed. Pt  now sedated , intubated, and admitted to MICU for further evaluation and management of hypothermia, shock, and r/o gastrointestinal hemorrhage.  (31 Dec 2020 19:53)      - Other: iStop reviewed.    - Other: Medication reviewed.    The patient HAS NOT used PRN's in the last 24h.    MEDICATIONS  (STANDING):  chlorhexidine 0.12% Liquid 15 milliLiter(s) Oral Mucosa every 12 hours  chlorhexidine 2% Cloths 1 Application(s) Topical <User Schedule>  CRRT Treatment    <Continuous>  dexMEDEtomidine Infusion 0.05 MICROgram(s)/kG/Hr (0.75 mL/Hr) IV Continuous <Continuous>  dextrose 40% Gel 15 Gram(s) Oral once  dextrose 5%. 1000 milliLiter(s) (50 mL/Hr) IV Continuous <Continuous>  dextrose 5%. 1000 milliLiter(s) (100 mL/Hr) IV Continuous <Continuous>  dextrose 50% Injectable 25 Gram(s) IV Push once  dextrose 50% Injectable 12.5 Gram(s) IV Push once  dextrose 50% Injectable 25 Gram(s) IV Push once  dextrose 50% Injectable 25 Gram(s) IV Push once  folic acid Injectable 1 milliGRAM(s) IV Push daily  glucagon  Injectable 1 milliGRAM(s) IntraMuscular once  heparin   Injectable 5000 Unit(s) SubCutaneous every 8 hours  hydrocortisone sodium succinate Injectable 50 milliGRAM(s) IV Push every 8 hours  insulin lispro (ADMELOG) corrective regimen sliding scale   SubCutaneous every 6 hours  metoprolol tartrate Injectable 2.5 milliGRAM(s) IV Push every 6 hours  norepinephrine Infusion 0.05 MICROgram(s)/kG/Min (5.63 mL/Hr) IV Continuous <Continuous>  pantoprazole  Injectable 40 milliGRAM(s) IV Push daily  Phoxillum Filtration BK 4 / 2.5 5000 milliLiter(s) (1200 mL/Hr) CRRT <Continuous>  piperacillin/tazobactam IVPB.. 2.25 Gram(s) IV Intermittent every 6 hours  polyethylene glycol 3350 17 Gram(s) Oral daily    MEDICATIONS  (PRN):      - Other: Advanced directives     Full Code     No documented MOLST form found on [facility]     No documented HCP form found on [facility]     No Living will / POA / Advance directives found on [place] / [facility].     No documented GOC notes on [facility]    - Other: Coordination/Plan of care     _1___ admissions in 1 year     Current admission LOS: _4__ days     LACE score: _3___ ADVANCE ILLNESS PATIENT.     Patient NOT previously seen by palliative medicine consult service.      Discussed with  Consult request for: "  Goals of care  "    Patient is an Advanced Illness patient hence the primary team will need to complete GOC document of any prior GOC discussions that were done during this admission so far as well as information available for Proxy/surrogates/NOK/guardians prior to a palliative contact.    Full consult to follow within 24h.    Will reassess later today during bedside rounds.

## 2021-01-04 NOTE — PROGRESS NOTE ADULT - SUBJECTIVE AND OBJECTIVE BOX
Overnight Events:    Subjective: Patient seen and examined at bedside.    Vital Signs:  T(F): , Max: 98 (01-03-21 @ 21:56)  HR:  (59 - 77)  BP:  (83/51 - 147/68)  BP(mean):  (61 - 95)  RR:  (14 - 23)  SpO2:  (100% - 100%)  Wt(kg): --  CVP(cm H2O): --  Mode: AC/ CMV (Assist Control/ Continuous Mandatory Ventilation), RR (machine): 14, RR (patient): 18, TV (machine): 450, FiO2: 50, PEEP: 5, ITime: 1, MAP: 11, PIP: 20    01-03 @ 07:01  -  01-04 @ 07:00  --------------------------------------------------------  IN: 3100.8 mL / OUT: 3079 mL / NET: 21.8 mL    01-04 @ 07:01  -  01-04 @ 20:43  --------------------------------------------------------  IN: 964.9 mL / OUT: 1106 mL / NET: -141.1 mL      CAPILLARY BLOOD GLUCOSE      POCT Blood Glucose.: 115 mg/dL (04 Jan 2021 16:34)      Physcial Exam:  T(F): 97 (01-04-21 @ 18:00)  HR: 77 (01-04-21 @ 20:00)  BP: 101/66 (01-04-21 @ 20:00)  RR: 14 (01-04-21 @ 20:00)  SpO2: 100% (01-04-21 @ 20:00)  Wt(kg): --        Medications:  MEDICATIONS  (STANDING):  chlorhexidine 0.12% Liquid 15 milliLiter(s) Oral Mucosa every 12 hours  chlorhexidine 4% Liquid 1 Application(s) Topical <User Schedule>  CRRT Treatment    <Continuous>  dextrose 40% Gel 15 Gram(s) Oral once  dextrose 5%. 1000 milliLiter(s) (50 mL/Hr) IV Continuous <Continuous>  dextrose 5%. 1000 milliLiter(s) (100 mL/Hr) IV Continuous <Continuous>  dextrose 50% Injectable 25 Gram(s) IV Push once  dextrose 50% Injectable 12.5 Gram(s) IV Push once  dextrose 50% Injectable 25 Gram(s) IV Push once  dextrose 50% Injectable 25 Gram(s) IV Push once  folic acid Injectable 1 milliGRAM(s) IV Push daily  glucagon  Injectable 1 milliGRAM(s) IntraMuscular once  hydrocortisone sodium succinate Injectable 100 milliGRAM(s) IV Push every 8 hours  insulin lispro (ADMELOG) corrective regimen sliding scale   SubCutaneous three times a day before meals  insulin lispro (ADMELOG) corrective regimen sliding scale   SubCutaneous at bedtime  metoprolol tartrate Injectable 2.5 milliGRAM(s) IV Push every 6 hours  norepinephrine Infusion 0.05 MICROgram(s)/kG/Min (5.63 mL/Hr) IV Continuous <Continuous>  pantoprazole  Injectable 40 milliGRAM(s) IV Push every 12 hours  Phoxillum Filtration BK 4 / 2.5 5000 milliLiter(s) (2000 mL/Hr) CRRT <Continuous>  piperacillin/tazobactam IVPB.. 2.25 Gram(s) IV Intermittent every 6 hours  propofol Infusion 5 MICROgram(s)/kG/Min (1.8 mL/Hr) IV Continuous <Continuous>    MEDICATIONS  (PRN):      Allergies:  Allergies    No Known Allergies    Intolerances        Labs:                        8.6    11.72 )-----------( 25       ( 04 Jan 2021 06:06 )             25.6     01-04    137  |  101  |  13  ----------------------------<  127<H>  3.5   |  25  |  1.26    Ca    8.4      04 Jan 2021 00:22  Phos  2.1     01-04  Mg     2.2     01-04    TPro  5.3<L>  /  Alb  2.6<L>  /  TBili  5.0<H>  /  DBili  x   /  AST  1937<H>  /  ALT  2126<H>  /  AlkPhos  103  01-03    PT/INR - ( 04 Jan 2021 06:06 )   PT: 13.6 sec;   INR: 1.14          PTT - ( 04 Jan 2021 06:06 )  PTT:36.8 sec      Radiology and other tests:   Overnight Events: No acute events.     Subjective: Patient seen and examined at bedside. Unable to obtain ROS as pt intubated and sedated.    Vital Signs:  T(F): , Max: 98 (01-03-21 @ 21:56)  HR:  (59 - 77)  BP:  (83/51 - 147/68)  BP(mean):  (61 - 95)  RR:  (14 - 23)  SpO2:  (100% - 100%)  Wt(kg): --  CVP(cm H2O): --  Mode: AC/ CMV (Assist Control/ Continuous Mandatory Ventilation), RR (machine): 14, RR (patient): 18, TV (machine): 450, FiO2: 50, PEEP: 5, ITime: 1, MAP: 11, PIP: 20    01-03 @ 07:01  -  01-04 @ 07:00  --------------------------------------------------------  IN: 3100.8 mL / OUT: 3079 mL / NET: 21.8 mL    01-04 @ 07:01 - 01-04 @ 20:43  --------------------------------------------------------  IN: 964.9 mL / OUT: 1106 mL / NET: -141.1 mL      CAPILLARY BLOOD GLUCOSE      POCT Blood Glucose.: 115 mg/dL (04 Jan 2021 16:34)    PHYSICAL EXAM  General: Intubated and sedated on propofol  HEENT: NC/AT; PERRL, anicteric sclera; MMM  Neck: supple. L IJ in place c/di  Cardiovascular: +S1/S2; RRR  Respiratory: bibasilar crackles; no W/R/R; intubated  Gastrointestinal: soft, NT/ND; +BSx4  Extremities: R groin HD cath in place c/d/i. WWP; no edema, clubbing or cyanosis  Vascular: 2+ radial, DP/PT pulses B/L  Neurological: Sedated ; responds to noxious stimuli        Medications:  MEDICATIONS  (STANDING):  chlorhexidine 0.12% Liquid 15 milliLiter(s) Oral Mucosa every 12 hours  chlorhexidine 4% Liquid 1 Application(s) Topical <User Schedule>  CRRT Treatment    <Continuous>  dextrose 40% Gel 15 Gram(s) Oral once  dextrose 5%. 1000 milliLiter(s) (50 mL/Hr) IV Continuous <Continuous>  dextrose 5%. 1000 milliLiter(s) (100 mL/Hr) IV Continuous <Continuous>  dextrose 50% Injectable 25 Gram(s) IV Push once  dextrose 50% Injectable 12.5 Gram(s) IV Push once  dextrose 50% Injectable 25 Gram(s) IV Push once  dextrose 50% Injectable 25 Gram(s) IV Push once  folic acid Injectable 1 milliGRAM(s) IV Push daily  glucagon  Injectable 1 milliGRAM(s) IntraMuscular once  hydrocortisone sodium succinate Injectable 100 milliGRAM(s) IV Push every 8 hours  insulin lispro (ADMELOG) corrective regimen sliding scale   SubCutaneous three times a day before meals  insulin lispro (ADMELOG) corrective regimen sliding scale   SubCutaneous at bedtime  metoprolol tartrate Injectable 2.5 milliGRAM(s) IV Push every 6 hours  norepinephrine Infusion 0.05 MICROgram(s)/kG/Min (5.63 mL/Hr) IV Continuous <Continuous>  pantoprazole  Injectable 40 milliGRAM(s) IV Push every 12 hours  Phoxillum Filtration BK 4 / 2.5 5000 milliLiter(s) (2000 mL/Hr) CRRT <Continuous>  piperacillin/tazobactam IVPB.. 2.25 Gram(s) IV Intermittent every 6 hours  propofol Infusion 5 MICROgram(s)/kG/Min (1.8 mL/Hr) IV Continuous <Continuous>    MEDICATIONS  (PRN):      Allergies:  Allergies    No Known Allergies    Intolerances        Labs:                        8.6    11.72 )-----------( 25       ( 04 Jan 2021 06:06 )             25.6     01-04    137  |  101  |  13  ----------------------------<  127<H>  3.5   |  25  |  1.26    Ca    8.4      04 Jan 2021 00:22  Phos  2.1     01-04  Mg     2.2     01-04    TPro  5.3<L>  /  Alb  2.6<L>  /  TBili  5.0<H>  /  DBili  x   /  AST  1937<H>  /  ALT  2126<H>  /  AlkPhos  103  01-03    PT/INR - ( 04 Jan 2021 06:06 )   PT: 13.6 sec;   INR: 1.14          PTT - ( 04 Jan 2021 06:06 )  PTT:36.8 sec      Radiology and other tests:

## 2021-01-04 NOTE — PROCEDURE NOTE - NSINDICATIONS_GEN_A_CORE
respiratory failure
dialysis/CRRT
arterial puncture to obtain ABG's/blood sampling/cannulation purposes/critical patient/monitoring purposes
critical illness/emergency venous access/hypertonic/irritant infusion/venous access
dialysis/CRRT

## 2021-01-04 NOTE — PROGRESS NOTE ADULT - ASSESSMENT
85M no reported PMHx who presented initially to [facility] ED after EMS found patient down on the street hypothermic to 77F and with altered mental status. Found to have ANTONELLA and hyperK. Nephrology consulted for ANTONELLA and hyperkalemia.      Assessment/Plan:   #hyperkalemia  #oligo-anuric ANTONELLA on CKD vs ANTONELLA   #transaminitis, shock liver   #coagulopathy  #thrombocytopenia   multi-organ failure, unclear precipitant   unclear baseline creatinine   etiology of ANTONELLA likely intra-renal ischemic ATN     Recommend:   continue with CVVHD net even fluid balance, 2K, 2L DFR,    q6h BMP Mg Phos while on CVVHD   repeat UA, urine lytes (Na, Cr, Urea, Osm) and urine protein-creatinine ratio   daily CXR    renal sono consistent with ANTONELLA   strict I/Os   renal diet     Patient was seen and evaluated on dialysis.     Hemodynamically stable.     CVVHD:   QB: 250 mL/min   Dialysis Fluid Rate: 2L/h   Prescribed UF set to achieve net even   Actual UF: 100mL/hr     Patient is tolerating the procedure well.   Continue CVVHD treatment as prescribed.    Thank you for the opportunity to participate in the care of your patient. The nephrology service remains available to assist with any questions or concerns. Please feel free to reach us by paging the on-call nephrology fellow for urgent issues or as below.     Ronnell Stout M.D.   PGY-4, Nephrology Fellow   C: [phone]   P: [phone]

## 2021-01-04 NOTE — PROCEDURE NOTE - PROCEDURE DATE TIME, MLM
04-Jan-2021 18:00
31-Dec-2020 18:30
01-Jan-2021 18:30
31-Dec-2020 18:45
31-Dec-2020 19:45
31-Dec-2020 20:05

## 2021-01-04 NOTE — PROCEDURE NOTE - NSINFORMCONSENT_GEN_A_CORE
Benefits, risks, and possible complications of procedure explained to patient/caregiver who verbalized understanding and gave written consent.
2 physician consent/This was an emergent procedure.
Benefits, risks, and possible complications of procedure explained to patient/caregiver who verbalized understanding and gave verbal consent.
This was an emergent procedure.

## 2021-01-04 NOTE — CONSULT NOTE ADULT - PROBLEM SELECTOR RECOMMENDATION 4
- Patient with multiorgan failure, including septic shock, respiratory and liver failure.  - Currently, patient is being treated in a critical care setting to evaluate for reversible conditions.  - Palliative care to reach out to patients sisters to discuss overall goals of care.  - Prior to intubation, patient was in agreement to trial of aggressive interventions.

## 2021-01-04 NOTE — CONSULT NOTE ADULT - PROBLEM SELECTOR RECOMMENDATION 9
- Patient presented with multiorgan failure, shock and respiratory failure on presentation.   - Intubated, requiring mechanical ventilation.   - Sedated. Weaning trials if patient improves.

## 2021-01-05 LAB
ALBUMIN SERPL ELPH-MCNC: 2.6 G/DL — LOW (ref 3.3–5)
ALP SERPL-CCNC: 118 U/L — SIGNIFICANT CHANGE UP (ref 40–120)
ALT FLD-CCNC: 960 U/L — HIGH (ref 10–45)
ANION GAP SERPL CALC-SCNC: 10 MMOL/L — SIGNIFICANT CHANGE UP (ref 5–17)
ANION GAP SERPL CALC-SCNC: 10 MMOL/L — SIGNIFICANT CHANGE UP (ref 5–17)
APTT BLD: 35.2 SEC — SIGNIFICANT CHANGE UP (ref 27.5–35.5)
AST SERPL-CCNC: 342 U/L — HIGH (ref 10–40)
BASOPHILS # BLD AUTO: 0.02 K/UL — SIGNIFICANT CHANGE UP (ref 0–0.2)
BASOPHILS NFR BLD AUTO: 0.3 % — SIGNIFICANT CHANGE UP (ref 0–2)
BILIRUB DIRECT SERPL-MCNC: 6.8 MG/DL — HIGH (ref 0–0.2)
BILIRUB INDIRECT FLD-MCNC: 1.1 MG/DL — HIGH (ref 0.2–1)
BILIRUB SERPL-MCNC: 7.9 MG/DL — HIGH (ref 0.2–1.2)
BUN SERPL-MCNC: 15 MG/DL — SIGNIFICANT CHANGE UP (ref 7–23)
BUN SERPL-MCNC: 20 MG/DL — SIGNIFICANT CHANGE UP (ref 7–23)
CALCIUM SERPL-MCNC: 7.9 MG/DL — LOW (ref 8.4–10.5)
CALCIUM SERPL-MCNC: 7.9 MG/DL — LOW (ref 8.4–10.5)
CHLORIDE SERPL-SCNC: 101 MMOL/L — SIGNIFICANT CHANGE UP (ref 96–108)
CHLORIDE SERPL-SCNC: 102 MMOL/L — SIGNIFICANT CHANGE UP (ref 96–108)
CO2 SERPL-SCNC: 24 MMOL/L — SIGNIFICANT CHANGE UP (ref 22–31)
CO2 SERPL-SCNC: 24 MMOL/L — SIGNIFICANT CHANGE UP (ref 22–31)
CREAT ?TM UR-MCNC: 32 MG/DL — SIGNIFICANT CHANGE UP
CREAT SERPL-MCNC: 1.02 MG/DL — SIGNIFICANT CHANGE UP (ref 0.5–1.3)
CREAT SERPL-MCNC: 1.06 MG/DL — SIGNIFICANT CHANGE UP (ref 0.5–1.3)
CULTURE RESULTS: SIGNIFICANT CHANGE UP
CULTURE RESULTS: SIGNIFICANT CHANGE UP
EOSINOPHIL # BLD AUTO: 0 K/UL — SIGNIFICANT CHANGE UP (ref 0–0.5)
EOSINOPHIL NFR BLD AUTO: 0 % — SIGNIFICANT CHANGE UP (ref 0–6)
GLUCOSE BLDC GLUCOMTR-MCNC: 111 MG/DL — HIGH (ref 70–99)
GLUCOSE BLDC GLUCOMTR-MCNC: 117 MG/DL — HIGH (ref 70–99)
GLUCOSE BLDC GLUCOMTR-MCNC: 144 MG/DL — HIGH (ref 70–99)
GLUCOSE BLDC GLUCOMTR-MCNC: 152 MG/DL — HIGH (ref 70–99)
GLUCOSE SERPL-MCNC: 142 MG/DL — HIGH (ref 70–99)
GLUCOSE SERPL-MCNC: 156 MG/DL — HIGH (ref 70–99)
HCT VFR BLD CALC: 24.7 % — LOW (ref 39–50)
HGB BLD-MCNC: 8.3 G/DL — LOW (ref 13–17)
IMM GRANULOCYTES NFR BLD AUTO: 0.3 % — SIGNIFICANT CHANGE UP (ref 0–1.5)
INR BLD: 1.01 — SIGNIFICANT CHANGE UP (ref 0.88–1.16)
LACTATE SERPL-SCNC: 1.6 MMOL/L — SIGNIFICANT CHANGE UP (ref 0.5–2)
LYMPHOCYTES # BLD AUTO: 0.39 K/UL — LOW (ref 1–3.3)
LYMPHOCYTES # BLD AUTO: 5.5 % — LOW (ref 13–44)
MAGNESIUM SERPL-MCNC: 2.2 MG/DL — SIGNIFICANT CHANGE UP (ref 1.6–2.6)
MAGNESIUM SERPL-MCNC: 2.3 MG/DL — SIGNIFICANT CHANGE UP (ref 1.6–2.6)
MCHC RBC-ENTMCNC: 30.1 PG — SIGNIFICANT CHANGE UP (ref 27–34)
MCHC RBC-ENTMCNC: 33.6 GM/DL — SIGNIFICANT CHANGE UP (ref 32–36)
MCV RBC AUTO: 89.5 FL — SIGNIFICANT CHANGE UP (ref 80–100)
MONOCYTES # BLD AUTO: 0.94 K/UL — HIGH (ref 0–0.9)
MONOCYTES NFR BLD AUTO: 13.2 % — SIGNIFICANT CHANGE UP (ref 2–14)
NEUTROPHILS # BLD AUTO: 5.73 K/UL — SIGNIFICANT CHANGE UP (ref 1.8–7.4)
NEUTROPHILS NFR BLD AUTO: 80.7 % — HIGH (ref 43–77)
NRBC # BLD: 0 /100 WBCS — SIGNIFICANT CHANGE UP (ref 0–0)
PHOSPHATE SERPL-MCNC: 2.9 MG/DL — SIGNIFICANT CHANGE UP (ref 2.5–4.5)
PHOSPHATE SERPL-MCNC: 3.3 MG/DL — SIGNIFICANT CHANGE UP (ref 2.5–4.5)
PLATELET # BLD AUTO: 56 K/UL — LOW (ref 150–400)
POTASSIUM SERPL-MCNC: 3.9 MMOL/L — SIGNIFICANT CHANGE UP (ref 3.5–5.3)
POTASSIUM SERPL-MCNC: 4 MMOL/L — SIGNIFICANT CHANGE UP (ref 3.5–5.3)
POTASSIUM SERPL-SCNC: 3.9 MMOL/L — SIGNIFICANT CHANGE UP (ref 3.5–5.3)
POTASSIUM SERPL-SCNC: 4 MMOL/L — SIGNIFICANT CHANGE UP (ref 3.5–5.3)
PROT SERPL-MCNC: 5.4 G/DL — LOW (ref 6–8.3)
PROTHROM AB SERPL-ACNC: 12.1 SEC — SIGNIFICANT CHANGE UP (ref 10.6–13.6)
RBC # BLD: 2.76 M/UL — LOW (ref 4.2–5.8)
RBC # FLD: 14.8 % — HIGH (ref 10.3–14.5)
SODIUM SERPL-SCNC: 135 MMOL/L — SIGNIFICANT CHANGE UP (ref 135–145)
SODIUM SERPL-SCNC: 136 MMOL/L — SIGNIFICANT CHANGE UP (ref 135–145)
SODIUM UR-SCNC: 21 MMOL/L — SIGNIFICANT CHANGE UP
SPECIMEN SOURCE: SIGNIFICANT CHANGE UP
SPECIMEN SOURCE: SIGNIFICANT CHANGE UP
TRIGL SERPL-MCNC: 275 MG/DL — HIGH
UUN UR-MCNC: <112 MG/DL — SIGNIFICANT CHANGE UP
WBC # BLD: 7.1 K/UL — SIGNIFICANT CHANGE UP (ref 3.8–10.5)
WBC # FLD AUTO: 7.1 K/UL — SIGNIFICANT CHANGE UP (ref 3.8–10.5)

## 2021-01-05 PROCEDURE — 93970 EXTREMITY STUDY: CPT | Mod: 26

## 2021-01-05 PROCEDURE — 99233 SBSQ HOSP IP/OBS HIGH 50: CPT | Mod: GC

## 2021-01-05 PROCEDURE — 99291 CRITICAL CARE FIRST HOUR: CPT

## 2021-01-05 RX ORDER — POLYETHYLENE GLYCOL 3350 17 G/17G
17 POWDER, FOR SOLUTION ORAL DAILY
Refills: 0 | Status: DISCONTINUED | OUTPATIENT
Start: 2021-01-05 | End: 2021-01-12

## 2021-01-05 RX ORDER — PANTOPRAZOLE SODIUM 20 MG/1
40 TABLET, DELAYED RELEASE ORAL
Refills: 0 | Status: DISCONTINUED | OUTPATIENT
Start: 2021-01-05 | End: 2021-01-05

## 2021-01-05 RX ORDER — CHLORHEXIDINE GLUCONATE 213 G/1000ML
1 SOLUTION TOPICAL
Refills: 0 | Status: DISCONTINUED | OUTPATIENT
Start: 2021-01-05 | End: 2021-01-15

## 2021-01-05 RX ORDER — PANTOPRAZOLE SODIUM 20 MG/1
40 TABLET, DELAYED RELEASE ORAL DAILY
Refills: 0 | Status: DISCONTINUED | OUTPATIENT
Start: 2021-01-06 | End: 2021-01-08

## 2021-01-05 RX ADMIN — PIPERACILLIN AND TAZOBACTAM 200 GRAM(S): 4; .5 INJECTION, POWDER, LYOPHILIZED, FOR SOLUTION INTRAVENOUS at 17:39

## 2021-01-05 RX ADMIN — Medication 1 MILLIGRAM(S): at 13:04

## 2021-01-05 RX ADMIN — Medication 100 MILLIGRAM(S): at 05:25

## 2021-01-05 RX ADMIN — Medication 100 MILLIGRAM(S): at 13:07

## 2021-01-05 RX ADMIN — PROPOFOL 1.8 MICROGRAM(S)/KG/MIN: 10 INJECTION, EMULSION INTRAVENOUS at 17:09

## 2021-01-05 RX ADMIN — POLYETHYLENE GLYCOL 3350 17 GRAM(S): 17 POWDER, FOR SOLUTION ORAL at 11:17

## 2021-01-05 RX ADMIN — PIPERACILLIN AND TAZOBACTAM 200 GRAM(S): 4; .5 INJECTION, POWDER, LYOPHILIZED, FOR SOLUTION INTRAVENOUS at 23:11

## 2021-01-05 RX ADMIN — PIPERACILLIN AND TAZOBACTAM 200 GRAM(S): 4; .5 INJECTION, POWDER, LYOPHILIZED, FOR SOLUTION INTRAVENOUS at 05:25

## 2021-01-05 RX ADMIN — PROPOFOL 1.8 MICROGRAM(S)/KG/MIN: 10 INJECTION, EMULSION INTRAVENOUS at 06:42

## 2021-01-05 RX ADMIN — Medication 2: at 18:04

## 2021-01-05 RX ADMIN — Medication 100 MILLIGRAM(S): at 21:39

## 2021-01-05 RX ADMIN — Medication 2.5 MILLIGRAM(S): at 05:25

## 2021-01-05 RX ADMIN — CHLORHEXIDINE GLUCONATE 1 APPLICATION(S): 213 SOLUTION TOPICAL at 07:19

## 2021-01-05 RX ADMIN — CHLORHEXIDINE GLUCONATE 15 MILLILITER(S): 213 SOLUTION TOPICAL at 21:39

## 2021-01-05 RX ADMIN — CHLORHEXIDINE GLUCONATE 15 MILLILITER(S): 213 SOLUTION TOPICAL at 10:58

## 2021-01-05 RX ADMIN — Medication 5.63 MICROGRAM(S)/KG/MIN: at 05:26

## 2021-01-05 RX ADMIN — PIPERACILLIN AND TAZOBACTAM 200 GRAM(S): 4; .5 INJECTION, POWDER, LYOPHILIZED, FOR SOLUTION INTRAVENOUS at 11:17

## 2021-01-05 RX ADMIN — Medication 2.5 MILLIGRAM(S): at 14:48

## 2021-01-05 NOTE — PROGRESS NOTE ADULT - ASSESSMENT
84 y/o man with no reported PMHx (possibly Afib, states not on any medications) who presented initially to [facility] ED after EMS found patient down on the street hypothermic to 77F and with altered mental status. Pt admitted to MICU for further evaluation and management of hypothermia, shock.     #) DIAGNOSIS  - Grade 4 thrombocytopenia, s/p platelets 1/4, Today 56k  - Leukocytosis, likely reactive - improving  - Normocytic anemia, stable, Hgb 8s  - Prolonged PT/PTT, improving   - Elevated D-Dimer 60166   - DIC 2/2 septic shock, resolved   - Shock liver, AHRF 2/2 aspiration pneumonia   - Possible coffee-ground residue on suction     #) PLAN  - s/p 2 U PRBC (12/31), 4 U Plasma (01/01), 1 U Cryoprecipitate (01/01)  - S/p platelets (1/4)  - B12 WNL. HIV/HCV, TSH WNL. Currently on IV Thiamine/Folate. Serum drug screen positive for alcohol. No cirrhosis or hepatosplenomegaly noted on radiographic imaging.   - Peripheral blood smear shows smudge cells and no schistocytes. Send peripheral flow cytometry   - Medical team to obtain home medications when able. Otherwise, in-hospital medications reviewed and are unlikely culprits for thrombocytopenia. Hold off on Aspirin and pharmacologic DVT ppx given platelet < 50 K.   - Patient has never received subcutaneous or intravenous heparin during course of hospitalization, making HIT very unlikely.   - Maintain active T+S, transfuse platelets if < 50 K with active bleed, < 20 K if febrile or < 10 K otherwise.   - Due to elevated D-Dimer, recommend US LE Venous Duplex bilaterally to rule-out consumptive process such as thrombus formation.   - Ongoing septic shock is likely cause of thrombocytopenia . Will continue to monitor platelets for now, CBC with differential once daily.   - HLH was initially a consideration given markedly elevated ferritin (30k) Send CD25/IL-2, NK cell activity.  However it is unlikely with improving LDH and transaminases have markedly improved.    -  DIC in the setting of septic shock that resolved with 1 U cryoprecipitate and 2 U plasma. Ongoing TMA as a cause of thrombocytopenia is less likely. Trend LDH, haptoglobin and PT/INR/PTT daily.    S/E/D with Dr. Lott

## 2021-01-05 NOTE — CHART NOTE - NSCHARTNOTEFT_GEN_A_CORE
Admitting Diagnosis:   Patient is a 85y old  Male who presents with a chief complaint of AMS, hypothermic, (05 Jan 2021 11:52)      PAST MEDICAL & SURGICAL HISTORY:  Medical history unknown    No significant past surgical history    Current Nutrition Order: NPO + TF via NGT receiving Jevity 1.5cal @ 32ml/hr x24 hrs + Prostat x3/day providing (768TV, 1452kcals, 94gm protein, 76%RDIs, ~1.5gm pro/kg ABW, ~24kcal/kg ABW)    PO Intake: Good (%) [   ]  Fair (50-75%) [   ] Poor (<25%) [   ]- n/a    GI Issues:   No N/V/C/D noted  Per flowsheets, +BM on 1/4. fecal incontinence noted 1/5.    Pain:  Unable to assess pain at this time.    Skin Integrity:  Skin intact  2+ generalized edema noted.  Mason Score:12    Labs:   01-05    135  |  101  |  15  ----------------------------<  156<H>  3.9   |  24  |  1.02    Ca    7.9<L>      05 Jan 2021 02:00  Phos  2.9     01-05  Mg     2.3     01-05    TPro  5.4<L>  /  Alb  2.6<L>  /  TBili  7.9<H>  /  DBili  6.8<H>  /  AST  342<H>  /  ALT  960<H>  /  AlkPhos  118  01-05    CAPILLARY BLOOD GLUCOSE    POCT Blood Glucose.: 117 mg/dL (05 Jan 2021 11:30)  POCT Blood Glucose.: 144 mg/dL (05 Jan 2021 06:33)  POCT Blood Glucose.: 130 mg/dL (04 Jan 2021 21:52)  POCT Blood Glucose.: 115 mg/dL (04 Jan 2021 16:34)    Medications:  MEDICATIONS  (STANDING):  chlorhexidine 0.12% Liquid 15 milliLiter(s) Oral Mucosa every 12 hours  chlorhexidine 2% Cloths 1 Application(s) Topical <User Schedule>  CRRT Treatment    <Continuous>  dextrose 40% Gel 15 Gram(s) Oral once  dextrose 5%. 1000 milliLiter(s) (50 mL/Hr) IV Continuous <Continuous>  dextrose 5%. 1000 milliLiter(s) (100 mL/Hr) IV Continuous <Continuous>  dextrose 50% Injectable 25 Gram(s) IV Push once  dextrose 50% Injectable 12.5 Gram(s) IV Push once  dextrose 50% Injectable 25 Gram(s) IV Push once  dextrose 50% Injectable 25 Gram(s) IV Push once  folic acid Injectable 1 milliGRAM(s) IV Push daily  glucagon  Injectable 1 milliGRAM(s) IntraMuscular once  hydrocortisone sodium succinate Injectable 100 milliGRAM(s) IV Push every 8 hours  insulin lispro (ADMELOG) corrective regimen sliding scale   SubCutaneous three times a day before meals  insulin lispro (ADMELOG) corrective regimen sliding scale   SubCutaneous at bedtime  metoprolol tartrate Injectable 2.5 milliGRAM(s) IV Push every 6 hours  norepinephrine Infusion 0.05 MICROgram(s)/kG/Min (5.63 mL/Hr) IV Continuous <Continuous>  Phoxillum Filtration BK 4 / 2.5 5000 milliLiter(s) (2000 mL/Hr) CRRT <Continuous>  piperacillin/tazobactam IVPB.. 2.25 Gram(s) IV Intermittent every 6 hours  polyethylene glycol 3350 17 Gram(s) Oral daily  propofol Infusion 5 MICROgram(s)/kG/Min (1.8 mL/Hr) IV Continuous <Continuous>    MEDICATIONS  (PRN):    Height: 5'7" Weight: 60kg IBW:67.1kg+/-10%, %IBW: 89%, BMI:20.6kg/m2    Weight Change: No new weights noted. Please obtain updated height and weight.    Estimated energy needs:   ABW(60kg) used to calculate energy needs due to pt's current body weight within % IBW (89%). Nutrient needs based on [facility] standards of care for maintenance in adults, adjusted for age, renal failure. Fluids per team.  1677 - 2013 kcal (25-30kcal/kg)  90- 102 gm of protein(1.5-1.7 gm/kg)    Subjective:   86y/o male with no known history(suspected ETOH abuse) presented to ED after being found on street with hypothermia 77F,altered mentation and coffee ground emesis.Pt admitted to MICU for further evaluation and management of hypothermia, shock, and r/o ischemic bowel/ gastrointestinal hemorrhage.  Pt visited as part of rounds this AM. Per team, plan for breathing trial and wean sedation to wake up patient. Pt on levophed with MAP: 86. Pt is NPO and receiving TF via NGT. Please see below for full nutritional recommendations- d/w team. RD to monitor and f/u per protocol.     Previous Nutrition Diagnosis:  Inadequate Oral Intake R/T inability to meet EER AEB: NPO/0% of EER met.   Active [   ]  Resolved [ X  ]    If resolved, new PES:   Increased Nutrient Needs (protein) RT increased demand 2/2 hypermetabolic state AEB vented in ICU  Active [ X ]  Resolved [   ]    Goal/Expected Outcome Consistently meet >75% est needs via most appropriate route with good tolerance    Recommendations:  1. Recommend continue with TF regimen  >>free water flushes per team  >>Order for volume based feeding protocol, maintain aspiration precautions, monitor for s/sx of GI distress  2. Monitor BMP, lytes, [id number]; replete lytes prn.   3. Pain and bowel regimen per team.  4. Obtain current weight and trend bi-weekly weights    Education: Deferred at this time.    Risk Level: High [X  ] Moderate [   ] Low [   ] Admitting Diagnosis:   Patient is a 85y old  Male who presents with a chief complaint of AMS, hypothermic, (05 Jan 2021 11:52)      PAST MEDICAL & SURGICAL HISTORY:  Medical history unknown    No significant past surgical history    Current Nutrition Order: NPO + TF via NGT receiving Jevity 1.5cal @ 32ml/hr x24 hrs + Prostat x3/day providing (768TV, 1452kcals, 94gm protein, 76%RDIs, ~1.5gm pro/kg ABW, ~24kcal/kg ABW)    PO Intake: Good (%) [   ]  Fair (50-75%) [   ] Poor (<25%) [   ]- n/a    GI Issues:   No N/V/C/D noted  Per flowsheets, +BM on 1/4.     Pain:  Unable to assess pain at this time.    Skin Integrity:  Skin intact  2+ generalized edema noted.  Mason Score:12    Labs:   01-05    135  |  101  |  15  ----------------------------<  156<H>  3.9   |  24  |  1.02    Ca    7.9<L>      05 Jan 2021 02:00  Phos  2.9     01-05  Mg     2.3     01-05    TPro  5.4<L>  /  Alb  2.6<L>  /  TBili  7.9<H>  /  DBili  6.8<H>  /  AST  342<H>  /  ALT  960<H>  /  AlkPhos  118  01-05    CAPILLARY BLOOD GLUCOSE    POCT Blood Glucose.: 117 mg/dL (05 Jan 2021 11:30)  POCT Blood Glucose.: 144 mg/dL (05 Jan 2021 06:33)  POCT Blood Glucose.: 130 mg/dL (04 Jan 2021 21:52)  POCT Blood Glucose.: 115 mg/dL (04 Jan 2021 16:34)    Medications:  MEDICATIONS  (STANDING):  chlorhexidine 0.12% Liquid 15 milliLiter(s) Oral Mucosa every 12 hours  chlorhexidine 2% Cloths 1 Application(s) Topical <User Schedule>  CRRT Treatment    <Continuous>  dextrose 40% Gel 15 Gram(s) Oral once  dextrose 5%. 1000 milliLiter(s) (50 mL/Hr) IV Continuous <Continuous>  dextrose 5%. 1000 milliLiter(s) (100 mL/Hr) IV Continuous <Continuous>  dextrose 50% Injectable 25 Gram(s) IV Push once  dextrose 50% Injectable 12.5 Gram(s) IV Push once  dextrose 50% Injectable 25 Gram(s) IV Push once  dextrose 50% Injectable 25 Gram(s) IV Push once  folic acid Injectable 1 milliGRAM(s) IV Push daily  glucagon  Injectable 1 milliGRAM(s) IntraMuscular once  hydrocortisone sodium succinate Injectable 100 milliGRAM(s) IV Push every 8 hours  insulin lispro (ADMELOG) corrective regimen sliding scale   SubCutaneous three times a day before meals  insulin lispro (ADMELOG) corrective regimen sliding scale   SubCutaneous at bedtime  metoprolol tartrate Injectable 2.5 milliGRAM(s) IV Push every 6 hours  norepinephrine Infusion 0.05 MICROgram(s)/kG/Min (5.63 mL/Hr) IV Continuous <Continuous>  Phoxillum Filtration BK 4 / 2.5 5000 milliLiter(s) (2000 mL/Hr) CRRT <Continuous>  piperacillin/tazobactam IVPB.. 2.25 Gram(s) IV Intermittent every 6 hours  polyethylene glycol 3350 17 Gram(s) Oral daily  propofol Infusion 5 MICROgram(s)/kG/Min (1.8 mL/Hr) IV Continuous <Continuous>    MEDICATIONS  (PRN):    Height: 5'7" Weight: 60kg IBW:67.1kg+/-10%, %IBW: 89%, BMI:20.6kg/m2    Weight Change: No new weights noted. Please obtain updated height and weight.    Estimated energy needs:   ABW(60kg) used to calculate energy needs due to pt's current body weight <100% IBW (89%). Nutrient needs based on [facility] standards of care for maintenance in adults, adjusted for age, renal failure. Fluids per team.  1677 - 2013 kcal (25-30kcal/kg)  90- 102 gm of protein(1.5-1.7 gm/kg)    Subjective:   86y/o male with unknown history(suspected ETOH abuse) presented to ED after being found on street with hypothermia 77F,altered mentation and coffee ground emesis. Pt admitted to MICU for further evaluation and management of hypothermia, shock, and r/o ischemic bowel/ gastrointestinal hemorrhage.  Pt is s/p HD catheter placement on 1/4.    Pt visited as part of rounds this AM. Pt remains intubated on AC-CMV mode. Per team, plan for breathing trial and wean sedation to wake up patient. Pt on levophed with MAP: 86. Pt is currently receiving CVVHD 2/2 renal failure. Pt is NPO and receiving TF via NGT.   Please see below for full nutritional recommendations- d/w team. RD to monitor and f/u per protocol.     Previous Nutrition Diagnosis:  Inadequate Oral Intake R/T inability to meet EER AEB: NPO/0% of EER met.   Active [   ]  Resolved [ X  ]    If resolved, new PES:   Increased Nutrient Needs (protein) RT increased demand 2/2 hypermetabolic state AEB vented in ICU  Active [ X ]  Resolved [   ]    Goal/Expected Outcome Consistently meet >75% est needs via most appropriate route with good tolerance    Recommendations:  1. Recommend continue with TF regimen.  >>free water flushes per team  >>Maintain aspiration precautions, monitor for s/sx of GI distress  2. Monitor BMP, lytes, [id number]; replete lytes prn.   3. Pain and bowel regimen per team.  4. Obtain current weight and trend bi-weekly weights    Education: Deferred at this time 2/2 pt's clinical status.    Risk Level: High [X  ] Moderate [   ] Low [   ] Admitting Diagnosis:   Patient is a 85y old  Male who presents with a chief complaint of AMS, hypothermic, (05 Jan 2021 11:52)      PAST MEDICAL & SURGICAL HISTORY:  Medical history unknown    No significant past surgical history    Current Nutrition Order: NPO + TF via NGT receiving Jevity 1.5cal @ 32ml/hr x24 hrs + Prostat x3/day providing (768TV, 1452kcals, 94gm protein, 76%RDIs, ~1.5gm pro/kg ABW, ~24kcal/kg ABW)    PO Intake: Good (%) [   ]  Fair (50-75%) [   ] Poor (<25%) [   ]- n/a    GI Issues:   No N/V/C/D noted  Per flowsheets, +BM on 1/4.     Pain:  Unable to assess pain at this time.    Skin Integrity:  Skin intact  2+ generalized edema noted.  Mason Score:12    Labs:   01-05    135  |  101  |  15  ----------------------------<  156<H>  3.9   |  24  |  1.02    Ca    7.9<L>      05 Jan 2021 02:00  Phos  2.9     01-05  Mg     2.3     01-05    TPro  5.4<L>  /  Alb  2.6<L>  /  TBili  7.9<H>  /  DBili  6.8<H>  /  AST  342<H>  /  ALT  960<H>  /  AlkPhos  118  01-05    CAPILLARY BLOOD GLUCOSE    POCT Blood Glucose.: 117 mg/dL (05 Jan 2021 11:30)  POCT Blood Glucose.: 144 mg/dL (05 Jan 2021 06:33)  POCT Blood Glucose.: 130 mg/dL (04 Jan 2021 21:52)  POCT Blood Glucose.: 115 mg/dL (04 Jan 2021 16:34)    Medications:  MEDICATIONS  (STANDING):  chlorhexidine 0.12% Liquid 15 milliLiter(s) Oral Mucosa every 12 hours  chlorhexidine 2% Cloths 1 Application(s) Topical <User Schedule>  CRRT Treatment    <Continuous>  dextrose 40% Gel 15 Gram(s) Oral once  dextrose 5%. 1000 milliLiter(s) (50 mL/Hr) IV Continuous <Continuous>  dextrose 5%. 1000 milliLiter(s) (100 mL/Hr) IV Continuous <Continuous>  dextrose 50% Injectable 25 Gram(s) IV Push once  dextrose 50% Injectable 12.5 Gram(s) IV Push once  dextrose 50% Injectable 25 Gram(s) IV Push once  dextrose 50% Injectable 25 Gram(s) IV Push once  folic acid Injectable 1 milliGRAM(s) IV Push daily  glucagon  Injectable 1 milliGRAM(s) IntraMuscular once  hydrocortisone sodium succinate Injectable 100 milliGRAM(s) IV Push every 8 hours  insulin lispro (ADMELOG) corrective regimen sliding scale   SubCutaneous three times a day before meals  insulin lispro (ADMELOG) corrective regimen sliding scale   SubCutaneous at bedtime  metoprolol tartrate Injectable 2.5 milliGRAM(s) IV Push every 6 hours  norepinephrine Infusion 0.05 MICROgram(s)/kG/Min (5.63 mL/Hr) IV Continuous <Continuous>  Phoxillum Filtration BK 4 / 2.5 5000 milliLiter(s) (2000 mL/Hr) CRRT <Continuous>  piperacillin/tazobactam IVPB.. 2.25 Gram(s) IV Intermittent every 6 hours  polyethylene glycol 3350 17 Gram(s) Oral daily  propofol Infusion 5 MICROgram(s)/kG/Min (1.8 mL/Hr) IV Continuous <Continuous>    MEDICATIONS  (PRN):    Height: 5'7" Weight: 60kg IBW:67.1kg+/-10%, %IBW: 89%, BMI:20.6kg/m2    Weight Change: No new weights noted. Please obtain updated height and weight.    Estimated energy needs:   ABW(60kg) used to calculate energy needs due to pt's current body weight <100% IBW (89%). Nutrient needs based on [facility] standards of care for maintenance in adults, adjusted for age, renal failure. Fluids per team.  1677 - 2013 kcal (25-30kcal/kg)  90- 102 gm of protein(1.5-1.7 gm/kg)    Subjective:   86y/o male with unknown history(suspected ETOH abuse) presented to ED after being found on street with hypothermia 77F, altered mentation and coffee ground emesis. Pt admitted to MICU for further evaluation and management of hypothermia, shock, and r/o ischemic bowel/ gastrointestinal hemorrhage. Pt intubated for airway protection. Pt is s/p HD catheter placement and initiation of CVVHD on 1/4.     Pt visited as part of rounds this AM. Pt remains intubated on AC-CMV mode. Per team, plan for breathing trial and wean sedation to wake up patient. Pt on levophed with MAP: 86. Pt is currently receiving CVVHD 2/2 renal failure. Pt is NPO and receiving TF via NGT.   Please see below for full nutritional recommendations- d/w team. RD to monitor and f/u per protocol.     Previous Nutrition Diagnosis:  Inadequate Oral Intake R/T inability to meet EER AEB: NPO/0% of EER met.   Active [   ]  Resolved [ X  ]    If resolved, new PES:   Increased Nutrient Needs (protein) RT increased demand 2/2 hypermetabolic state AEB vented in ICU  Active [ X ]  Resolved [   ]    Goal/Expected Outcome Consistently meet >75% est needs via most appropriate route with good tolerance    Recommendations:  1. Recommend continue with TF regimen.  >>free water flushes per team  >>Maintain aspiration precautions, monitor for s/sx of GI distress  2. Monitor BMP, lytes, [id number]; replete lytes prn.   3. Pain and bowel regimen per team.  4. Obtain current weight and trend bi-weekly weights    Education: Deferred at this time 2/2 pt's clinical status.    Risk Level: High [X  ] Moderate [   ] Low [   ]

## 2021-01-05 NOTE — PROGRESS NOTE ADULT - SUBJECTIVE AND OBJECTIVE BOX
Patient is a 85y Male seen and evaluated at bedside.   remains oligo-anuric on CVVHD, net even, tolerating well  BP acceptable   remains on pressors   oxygen requirements ~stable   2.5L UOP/24h; net ~negative 1/24h       Meds:    chlorhexidine 0.12% Liquid 15 every 12 hours  chlorhexidine 2% Cloths 1 <User Schedule>  CRRT Treatment  <Continuous>  dextrose 40% Gel 15 once  dextrose 5%. 1000 <Continuous>  dextrose 5%. 1000 <Continuous>  dextrose 50% Injectable 25 once  dextrose 50% Injectable 12.5 once  dextrose 50% Injectable 25 once  dextrose 50% Injectable 25 once  folic acid Injectable 1 daily  glucagon  Injectable 1 once  hydrocortisone sodium succinate Injectable 100 every 8 hours  insulin lispro (ADMELOG) corrective regimen sliding scale  three times a day before meals  insulin lispro (ADMELOG) corrective regimen sliding scale  at bedtime  metoprolol tartrate Injectable 2.5 every 6 hours  norepinephrine Infusion 0.05 <Continuous>  Phoxillum Filtration BK 4 / 2.5 5000 <Continuous>  piperacillin/tazobactam IVPB.. 2.25 every 6 hours  polyethylene glycol 3350 17 daily  propofol Infusion 5 <Continuous>      T(C): , Max: 36.7 (01-05-21 @ 06:00)  T(F): , Max: 98 (01-05-21 @ 06:00)  HR: 93 (01-05-21 @ 13:00)  BP: 110/56 (01-05-21 @ 13:00)  BP(mean): 75 (01-05-21 @ 13:00)  RR: 25 (01-05-21 @ 13:00)  SpO2: 100% (01-05-21 @ 13:00)  Wt(kg): --    01-04 @ 07:01  -  01-05 @ 07:00  --------------------------------------------------------  IN: 1520.9 mL / OUT: 2480 mL / NET: -959.1 mL    01-05 @ 07:01  -  01-05 @ 13:37  --------------------------------------------------------  IN: 263 mL / OUT: 519 mL / NET: -256 mL            Review of Systems:  unable to obtain    PHYSICAL EXAM:  GENERAL: intubated, sedated, FiO2 35%   CHEST/LUNG: Bilateral clear breath sounds  HEART: Regular rate and rhythm, no murmur, no gallops, no rub   ABDOMEN: Soft, nontender, non distended  EXTREMITIES: no pedal edema  ACCESS: R fem temp hemodialysis catheter       LABS:                        8.3    7.10  )-----------( 56       ( 05 Jan 2021 05:25 )             24.7     01-05    135  |  101  |  15  ----------------------------<  156<H>  3.9   |  24  |  1.02    Ca    7.9<L>      05 Jan 2021 02:00  Phos  2.9     01-05  Mg     2.3     01-05    TPro  5.4<L>  /  Alb  2.6<L>  /  TBili  7.9<H>  /  DBili  6.8<H>  /  AST  342<H>  /  ALT  960<H>  /  AlkPhos  118  01-05      PT/INR - ( 05 Jan 2021 05:26 )   PT: 12.1 sec;   INR: 1.01          PTT - ( 05 Jan 2021 05:26 )  PTT:35.2 sec    Sodium, Random Urine: 21 mmol/L (01-05 @ 09:54)  Creatinine, Random Urine: 32 mg/dL (01-05 @ 09:54)        RADIOLOGY & ADDITIONAL STUDIES:

## 2021-01-05 NOTE — PROGRESS NOTE ADULT - SUBJECTIVE AND OBJECTIVE BOX
Heme/Onc Progress Note (Dr. Lott )    The patient was seen and examined.      The patient is a 85y Male  with history of HEME/ONC DISEASE admitted with HYPOGLYCEMIA  86 y/o man with no reported PMHx (possibly Afib, states not on any medications) who presented initially to [facility] ED after EMS found patient down on the street hypothermic to 77F and with altered mental status. Pt admitted to MICU for further evaluation and management of hypothermia, shock.    Interval History: Patient remains intubated, labs were reviewed.     Allergies    No Known Allergies    Intolerances        Medications:  MEDICATIONS  (STANDING):  chlorhexidine 0.12% Liquid 15 milliLiter(s) Oral Mucosa every 12 hours  chlorhexidine 2% Cloths 1 Application(s) Topical <User Schedule>  CRRT Treatment    <Continuous>  dextrose 40% Gel 15 Gram(s) Oral once  dextrose 5%. 1000 milliLiter(s) (50 mL/Hr) IV Continuous <Continuous>  dextrose 5%. 1000 milliLiter(s) (100 mL/Hr) IV Continuous <Continuous>  dextrose 50% Injectable 25 Gram(s) IV Push once  dextrose 50% Injectable 12.5 Gram(s) IV Push once  dextrose 50% Injectable 25 Gram(s) IV Push once  dextrose 50% Injectable 25 Gram(s) IV Push once  folic acid Injectable 1 milliGRAM(s) IV Push daily  glucagon  Injectable 1 milliGRAM(s) IntraMuscular once  hydrocortisone sodium succinate Injectable 100 milliGRAM(s) IV Push every 8 hours  insulin lispro (ADMELOG) corrective regimen sliding scale   SubCutaneous three times a day before meals  insulin lispro (ADMELOG) corrective regimen sliding scale   SubCutaneous at bedtime  metoprolol tartrate Injectable 2.5 milliGRAM(s) IV Push every 6 hours  norepinephrine Infusion 0.05 MICROgram(s)/kG/Min (5.63 mL/Hr) IV Continuous <Continuous>  Phoxillum Filtration BK 4 / 2.5 5000 milliLiter(s) (2000 mL/Hr) CRRT <Continuous>  piperacillin/tazobactam IVPB.. 2.25 Gram(s) IV Intermittent every 6 hours  polyethylene glycol 3350 17 Gram(s) Oral daily  propofol Infusion 5 MICROgram(s)/kG/Min (1.8 mL/Hr) IV Continuous <Continuous>    MEDICATIONS  (PRN):            PHYSICAL EXAM:    T(F): 96 (01-05-21 @ 10:00), Max: 98 (01-05-21 @ 06:00)  HR: 93 (01-05-21 @ 13:00) (59 - 93)  BP: 110/56 (01-05-21 @ 13:00) (82/50 - 147/68)  RR: 25 (01-05-21 @ 13:00) (14 - 27)  SpO2: 100% (01-05-21 @ 13:00) (99% - 100%)  Wt(kg): --    Daily     Daily     GEN: No acute distress, intubated, sedated, on feeding tube  HEENT: NCAT  LUNGS: Clear to auscultation bilaterally   HEART: S1/S2 present. RRR.   ABD: Soft, non-tender, non-distended. Bowel sounds present  EXT: No pitting edema  NEURO: Sedated      Labs:                          8.3    7.10  )-----------( 56       ( 05 Jan 2021 05:25 )             24.7     CBC Full  -  ( 05 Jan 2021 05:25 )  WBC Count : 7.10 K/uL  RBC Count : 2.76 M/uL  Hemoglobin : 8.3 g/dL  Hematocrit : 24.7 %  Platelet Count - Automated : 56 K/uL  Mean Cell Volume : 89.5 fl  Mean Cell Hemoglobin : 30.1 pg  Mean Cell Hemoglobin Concentration : 33.6 gm/dL  Auto Neutrophil # : 5.73 K/uL  Auto Lymphocyte # : 0.39 K/uL  Auto Monocyte # : 0.94 K/uL  Auto Eosinophil # : 0.00 K/uL  Auto Basophil # : 0.02 K/uL  Auto Neutrophil % : 80.7 %  Auto Lymphocyte % : 5.5 %  Auto Monocyte % : 13.2 %  Auto Eosinophil % : 0.0 %  Auto Basophil % : 0.3 %    PT/INR - ( 05 Jan 2021 05:26 )   PT: 12.1 sec;   INR: 1.01          PTT - ( 05 Jan 2021 05:26 )  PTT:35.2 sec    01-05    135  |  101  |  15  ----------------------------<  156<H>  3.9   |  24  |  1.02    Ca    7.9<L>      05 Jan 2021 02:00  Phos  2.9     01-05  Mg     2.3     01-05    TPro  5.4<L>  /  Alb  2.6<L>  /  TBili  7.9<H>  /  DBili  6.8<H>  /  AST  342<H>  /  ALT  960<H>  /  AlkPhos  118  01-05             Heme/Onc Progress Note (Dr. Lott )    The patient was seen and examined.        84 y/o man with no reported PMHx (possibly Afib, states not on any medications) who presented initially to [facility] ED after EMS found patient down on the street hypothermic to 77F and with altered mental status. Pt admitted to MICU for further evaluation and management of hypothermia, shock.    Interval History: Patient remains intubated, labs were reviewed.     Allergies    No Known Allergies    Intolerances        Medications:  MEDICATIONS  (STANDING):  chlorhexidine 0.12% Liquid 15 milliLiter(s) Oral Mucosa every 12 hours  chlorhexidine 2% Cloths 1 Application(s) Topical <User Schedule>  CRRT Treatment    <Continuous>  dextrose 40% Gel 15 Gram(s) Oral once  dextrose 5%. 1000 milliLiter(s) (50 mL/Hr) IV Continuous <Continuous>  dextrose 5%. 1000 milliLiter(s) (100 mL/Hr) IV Continuous <Continuous>  dextrose 50% Injectable 25 Gram(s) IV Push once  dextrose 50% Injectable 12.5 Gram(s) IV Push once  dextrose 50% Injectable 25 Gram(s) IV Push once  dextrose 50% Injectable 25 Gram(s) IV Push once  folic acid Injectable 1 milliGRAM(s) IV Push daily  glucagon  Injectable 1 milliGRAM(s) IntraMuscular once  hydrocortisone sodium succinate Injectable 100 milliGRAM(s) IV Push every 8 hours  insulin lispro (ADMELOG) corrective regimen sliding scale   SubCutaneous three times a day before meals  insulin lispro (ADMELOG) corrective regimen sliding scale   SubCutaneous at bedtime  metoprolol tartrate Injectable 2.5 milliGRAM(s) IV Push every 6 hours  norepinephrine Infusion 0.05 MICROgram(s)/kG/Min (5.63 mL/Hr) IV Continuous <Continuous>  Phoxillum Filtration BK 4 / 2.5 5000 milliLiter(s) (2000 mL/Hr) CRRT <Continuous>  piperacillin/tazobactam IVPB.. 2.25 Gram(s) IV Intermittent every 6 hours  polyethylene glycol 3350 17 Gram(s) Oral daily  propofol Infusion 5 MICROgram(s)/kG/Min (1.8 mL/Hr) IV Continuous <Continuous>    MEDICATIONS  (PRN):            PHYSICAL EXAM:    T(F): 96 (01-05-21 @ 10:00), Max: 98 (01-05-21 @ 06:00)  HR: 93 (01-05-21 @ 13:00) (59 - 93)  BP: 110/56 (01-05-21 @ 13:00) (82/50 - 147/68)  RR: 25 (01-05-21 @ 13:00) (14 - 27)  SpO2: 100% (01-05-21 @ 13:00) (99% - 100%)  Wt(kg): --    Daily     Daily     GEN: No acute distress, intubated, sedated, on feeding tube  HEENT: NCAT  LUNGS: Clear to auscultation bilaterally   HEART: S1/S2 present. RRR.   ABD: Soft, non-tender, non-distended. Bowel sounds present  EXT: No pitting edema  NEURO: Sedated      Labs:                          8.3    7.10  )-----------( 56       ( 05 Jan 2021 05:25 )             24.7     CBC Full  -  ( 05 Jan 2021 05:25 )  WBC Count : 7.10 K/uL  RBC Count : 2.76 M/uL  Hemoglobin : 8.3 g/dL  Hematocrit : 24.7 %  Platelet Count - Automated : 56 K/uL  Mean Cell Volume : 89.5 fl  Mean Cell Hemoglobin : 30.1 pg  Mean Cell Hemoglobin Concentration : 33.6 gm/dL  Auto Neutrophil # : 5.73 K/uL  Auto Lymphocyte # : 0.39 K/uL  Auto Monocyte # : 0.94 K/uL  Auto Eosinophil # : 0.00 K/uL  Auto Basophil # : 0.02 K/uL  Auto Neutrophil % : 80.7 %  Auto Lymphocyte % : 5.5 %  Auto Monocyte % : 13.2 %  Auto Eosinophil % : 0.0 %  Auto Basophil % : 0.3 %    PT/INR - ( 05 Jan 2021 05:26 )   PT: 12.1 sec;   INR: 1.01          PTT - ( 05 Jan 2021 05:26 )  PTT:35.2 sec    01-05    135  |  101  |  15  ----------------------------<  156<H>  3.9   |  24  |  1.02    Ca    7.9<L>      05 Jan 2021 02:00  Phos  2.9     01-05  Mg     2.3     01-05    TPro  5.4<L>  /  Alb  2.6<L>  /  TBili  7.9<H>  /  DBili  6.8<H>  /  AST  342<H>  /  ALT  960<H>  /  AlkPhos  118  01-05

## 2021-01-05 NOTE — PROGRESS NOTE ADULT - ASSESSMENT
84 y/o man with no reported PMHx (possibly Afib, states not on any medications) who presented initially to [facility] ED after EMS found patient down on the street hypothermic to 77F and with altered mental status. Pt admitted to MICU for further evaluation and management of hypothermia, shock, and r/o ischemic bowel/ gastrointestinal hemorrhage.     NEURO  -weaned off propofol in AM, still very somnolent.     #Encephalopathy  Patient presented to [facility] with AMS. CTH noncontrast without acute bleed or fracture. Alcohol level elevated with hx of alcohol use. UTox negative  - pt initially with severe hypothermia, metabolic derangements, and shock likely contributing to altered mental status.   - weaned off propofol, still somnolent, not following commands  - continue to monitor mental status off sedation    CARDIOVASCULAR  #SHOCK  Pt found to be hypotensive to 80s/40s likely 2/2 to hemorrhagic/hypovolemic shock vs septic shock (unclear source) vs distributive (rewarming). Hemorrhagic source evidenced by Hgb drop from 8.2 to 5.9 with coffee grounds suctioned from sump placed. Pt with alerted mentation, low urine output, and cold to touch.   - s/p volume resuscitation with blood products as well as intermittent fluid boluses  - weaned of levo gtt  - continue w/ zosyn 2.25 IV q6h x 7 days     #Afib with RVR  - Intermittently in Afib with RVR on 1/2   - c/w lopressor 2.5 q6  - SOSA w/ Grade I left ventricular diastolic dysfunction, Aortic sclerosis without significant stenosis, Trace aortic regurgitation, Moderate tricuspid regurgitation.    PULMONARY  #Respiratory Failure  Pt not hypoxic but with tachypnea, increased WOB with use of accessory muscles. Pt intubated due to concern for eventual respiratory failure and air way protection  - Currently on vent   - continue SBT when more alert     GASTROINTESTINAL  #r/o Bowel Ischemia  Pt with Hb 5.8 upon MICU arrival, with coffee ground output from sump. In the setting of shock and low Hb, and afib noted at [facility],   -CTA A/P performed with no intestinal bleed or mesenteric ischemia  -Surgery consulted. no surgical intervention at this time     #r/o Gastrointestinal Hemorrhage  See above  - s/p IV protonix gtt and octreotide gtt  - d/c octreotide drip due to no evidence of portal HTN or cirrhosis on imagining   - c/w PPI daily  - GI consulted, f/u recs    #Acute liver failure  LFTs > 7000, with coagulopathy and Utox negative. Acetaminophen level negative. Alcohol level 26. Hx of alcohol use. Liver failure possibly 2/2 to shock liver   - c/w NAC protocol  - Vitamin K administered for coagulopathy  - monitor INR   - continue to trend LFTs    RENAL  #Anion Gap Metabolic Acidosis  Likely 2/2 lactic acidosis. AG of 28 on presentation with pH 7.19, pCO2 15, bicarb 6 on arrival to MICU. Initiated on bicarb gtt here and s/p 2 amps bicarb  - RESOLVED  - f/u Q4H BMP    #Acute Renal Failure  Pt with sCr 2.93 on presentation (unknown baseline), improved s/p fluid boluses in ED. 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 In the setting of ANTONELLA  - Resolved   - trend  bmp    INFECTIOUS DISEASE  #LLL consolidation vs atelectasis  - continue w/ zosyn 2.25 IV q6h x 7 days   - MRSA swab negative    ENDOCRINE  #Hyperglycemia  Possibly in the setting of sepsis?   -now resolved    HEME  #Normocytic Anemia   On presentation, patient with Hb 8.2, with repeat of 5.8 on MICU arrival. Now s/p 2u pRBC, with posttransfusion Hb 8.8  - s/p 2 U PRBC 12/31  - hgb stable  - trend CBC  - maintain active T&S    #Thrombocytopenia   - Plt 70 on presentation-->20s Likely 2/2 DIC in setting of septic shock versus ITP  - since admission LDH downtrending, haptoglobin increasing, fibrinogen increasing s/p 1 U cryoprecipitate and 2 U FFP supporting DIC in setting of septic shock   - Due to elevated D-Dimer will obtain US LE Venous Duplex bilaterally to r/o thrombus   - f/u peripheral flow cytometry and blue top for platelets to rule-out pseudothrombocytopenia  - Coomb's testing, anticardiolipin Ab, beta2 microglobulin, lupus anticoagulant, reticulocyte count.   - triglyceride, CD25/IL-2, NK cell activity.  - Trend LDH, haptoglobin and PT/INR/PTT daily.   - Maintain active T+S  - transfuse platelets if < 50 K with active bleed, < 20 K if febrile or < 10 K otherwise.     #Elevated INR  In the setting of acute liver failure. Repeat INR 2.39  - s/p IV vitamin K 10mg daily x 3 days per GI  - s/p 4 U FFP (01/01), 1 U Cryoprecipitate (01/01)  - Trend LDH, haptoglobin and PT/INR/PTT daily     MSK  #R pubic rim fracture  Noted on CTA abd/pelvis with adjacent hematoma without active signs of bleeding (per wet radiology read).   - Ortho consulted. currently no surgical intervention indicated at this time.      - indwelling mcbride in place    RHEUM  - per collateral obtained by PCP patient w/ hx of RA on prednisone 10 mg   - c/w hydrocortisone 100 mg q8     FLUIDS/ELECTROLYTES/NUTRITION  -IVF: none  -Monitor, Replete to K>4 and Mg>2  -Diet: jevity 1.5  PROPHYLAXIS  -DVT: SCDs  -GI ppx: protonix daily    DISPO: MICU  CODE STATUS: FULL             86 y/o man with no reported PMHx (possibly Afib, states not on any medications) who presented initially to [facility] ED after EMS found patient down on the street hypothermic to 77F and with altered mental status. Pt admitted to MICU for further evaluation and management of hypothermia, shock, and r/o ischemic bowel/ gastrointestinal hemorrhage.     NEURO  -weaned off propofol in AM, still very somnolent.     #Encephalopathy  Patient presented to [facility] with AMS. CTH noncontrast without acute bleed or fracture. Alcohol level elevated with hx of alcohol use. UTox negative  - pt initially with severe hypothermia, metabolic derangements, and shock likely contributing to altered mental status.   - weaned off propofol, still somnolent, not following commands  - continue to monitor mental status off sedation    CARDIOVASCULAR  #SHOCK  Pt found to be hypotensive to 80s/40s likely 2/2 to hemorrhagic/hypovolemic shock vs septic shock (unclear source) vs distributive (rewarming). Hemorrhagic source evidenced by Hgb drop from 8.2 to 5.9 with coffee grounds suctioned from sump placed. Pt with alerted mentation, low urine output, and cold to touch.   - s/p volume resuscitation with blood products as well as intermittent fluid boluses  - weaned of levo gtt  - continue w/ zosyn 2.25 IV q6h x 7 days     #Afib with RVR  - Intermittently in Afib with RVR on 1/2   - c/w lopressor 2.5 q6  - SOSA w/ Grade I left ventricular diastolic dysfunction, Aortic sclerosis without significant stenosis, Trace aortic regurgitation, Moderate tricuspid regurgitation.    PULMONARY  #Respiratory Failure  Pt not hypoxic but with tachypnea, increased WOB with use of accessory muscles. Pt intubated due to concern for eventual respiratory failure and air way protection  - Currently on vent   - trial SBT when more alert     GASTROINTESTINAL  #r/o Bowel Ischemia  Pt with Hb 5.8 upon MICU arrival, with coffee ground output from sump. In the setting of shock and low Hb, and afib noted at [facility],   -CTA A/P performed with no intestinal bleed or mesenteric ischemia  -Surgery consulted. no surgical intervention at this time     #r/o Gastrointestinal Hemorrhage  See above  - s/p IV protonix gtt and octreotide gtt  - d/c octreotide drip due to no evidence of portal HTN or cirrhosis on imagining   - c/w PPI daily  - GI consulted, f/u recs    #Acute liver failure  LFTs > 7000, with coagulopathy and Utox negative. Acetaminophen level negative. Alcohol level 26. Hx of alcohol use. Liver failure possibly 2/2 to shock liver   - c/w NAC protocol  - Vitamin K administered for coagulopathy  - monitor INR   - continue to trend LFTs    RENAL  #Anion Gap Metabolic Acidosis  Likely 2/2 lactic acidosis. AG of 28 on presentation with pH 7.19, pCO2 15, bicarb 6 on arrival to MICU. Initiated on bicarb gtt here and s/p 2 amps bicarb  - RESOLVED  - f/u Q4H BMP    #Acute Renal Failure  Pt with sCr 2.93 on presentation (unknown baseline), improved s/p fluid boluses in ED. 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 In the setting of ANTONELLA  - Resolved   - trend  bmp    INFECTIOUS DISEASE  #LLL consolidation vs atelectasis  - continue w/ zosyn 2.25 IV q6h x 7 days   - MRSA swab negative    ENDOCRINE  #Hyperglycemia  Possibly in the setting of sepsis?   -now resolved    HEME  #Normocytic Anemia   On presentation, patient with Hb 8.2, with repeat of 5.8 on MICU arrival. Now s/p 2u pRBC, with posttransfusion Hb 8.8  - s/p 2 U PRBC 12/31  - hgb stable  - trend CBC  - maintain active T&S    #Thrombocytopenia   - Plt 70 on presentation-->20s Likely 2/2 DIC in setting of septic shock versus ITP  - since admission LDH downtrending, haptoglobin increasing, fibrinogen increasing s/p 1 U cryoprecipitate and 2 U FFP supporting DIC in setting of septic shock   - Due to elevated D-Dimer will obtain US LE Venous Duplex bilaterally to r/o thrombus   - f/u peripheral flow cytometry and blue top for platelets to rule-out pseudothrombocytopenia  - Coomb's testing, anticardiolipin Ab, beta2 microglobulin, lupus anticoagulant, reticulocyte count.   - triglyceride, CD25/IL-2, NK cell activity.  - Trend LDH, haptoglobin and PT/INR/PTT daily.   - Maintain active T+S  - transfuse platelets if < 50 K with active bleed, < 20 K if febrile or < 10 K otherwise.     #Elevated INR  In the setting of acute liver failure. Repeat INR 2.39  - s/p IV vitamin K 10mg daily x 3 days per GI  - s/p 4 U FFP (01/01), 1 U Cryoprecipitate (01/01)  - Trend LDH, haptoglobin and PT/INR/PTT daily     MSK  #R pubic rim fracture  Noted on CTA abd/pelvis with adjacent hematoma without active signs of bleeding (per wet radiology read).   - Ortho consulted. currently no surgical intervention indicated at this time.      - indwelling mcbride in place    RHEUM  - per collateral obtained by PCP patient w/ hx of RA on prednisone 10 mg   - c/w hydrocortisone 100 mg q8     FLUIDS/ELECTROLYTES/NUTRITION  -IVF: none  -Monitor, Replete to K>4 and Mg>2  -Diet: jevity 1.5  PROPHYLAXIS  -DVT: SCDs  -GI ppx: protonix daily    DISPO: MICU  CODE STATUS: FULL

## 2021-01-05 NOTE — PROGRESS NOTE ADULT - ASSESSMENT
85M no reported PMHx who presented initially to [facility] ED after EMS found patient down on the street hypothermic to 77F and with altered mental status. Found to have ANTONELLA and hyperK. Nephrology consulted for ANTONELLA and hyperkalemia.      Assessment/Plan:   #hyperkalemia  #oligo-anuric ANTONELLA on CKD vs ANTONELLA   #transaminitis, shock liver   #coagulopathy  #thrombocytopenia   multi-organ failure, unclear precipitant   unclear baseline creatinine   etiology of ANTONELLA likely intra-renal ischemic ATN     Recommend:   continue with CVVHD net even fluid balance, 2K, 2L DFR,    q6h BMP Mg Phos while on CVVHD   repeat UA, urine lytes (Na, Cr, Urea, Osm) and urine protein-creatinine ratio   daily CXR    renal sono consistent with ANTONELLA   strict I/Os   renal diet     Patient was seen and evaluated on dialysis.     Hemodynamically stable.     CVVHD:   QB: 250 mL/min   Dialysis Fluid Rate: 2L/h   Prescribed UF set to achieve net even   Actual UF: 100mL/hr     Patient is tolerating the procedure well.   Continue CVVHD treatment as prescribed.    Thank you for the opportunity to participate in the care of your patient. The nephrology service remains available to assist with any questions or concerns. Please feel free to reach us by paging the on-call nephrology fellow for urgent issues or as below.     Ronnell Stout M.D.   PGY-4, Nephrology Fellow   C: [phone]   P: [phone]

## 2021-01-05 NOTE — PROGRESS NOTE ADULT - SUBJECTIVE AND OBJECTIVE BOX
Overnight Events: NGT placement confirmed. MD bladder scan 131. 2am BMP wnl.     Subjective: Patient seen and examined at bedside. Propofol stopped in am, patient still very somnolent no SBT performed. Unable to obtain ROS dt patients mental status    Vital Signs:  T(F): , Max: 98.3 (01-05-21 @ 17:00)  HR:  (63 - 99)  BP:  (82/50 - 133/77)  BP(mean):  (62 - 93)  RR:  (14 - 28)  SpO2:  (92% - 100%)  Wt(kg): --  CVP(cm H2O): --  Mode: AC/ CMV (Assist Control/ Continuous Mandatory Ventilation), RR (machine): 14, RR (patient): 23, TV (machine): 450, FiO2: 35, PEEP: 5, ITime: 0.9, MAP: 8, PIP: 8    01-04 @ 07:01 - 01-05 @ 07:00  --------------------------------------------------------  IN: 1520.9 mL / OUT: 2480 mL / NET: -959.1 mL    01-05 @ 07:01 - 01-05 @ 20:40  --------------------------------------------------------  IN: 477 mL / OUT: 1285 mL / NET: -808 mL      CAPILLARY BLOOD GLUCOSE      POCT Blood Glucose.: 152 mg/dL (05 Jan 2021 17:49)    PHYSICAL EXAM  General: Intubated and sedated on propofol  HEENT: NC/AT; PERRL, anicteric sclera; MMM  Neck: supple. L IJ in place c/di  Cardiovascular: +S1/S2; RRR  Respiratory: bibasilar crackles; no W/R/R;   Gastrointestinal: soft, NT/ND; +BSx4  Extremities: R groin HD cath in place c/d/i. R>L UE edema. LE with trace edema  Vascular: 2+ radial, DP/PT pulses B/L  Neurological: Sedated ; responds to noxious stimuli     Medications:  MEDICATIONS  (STANDING):  chlorhexidine 0.12% Liquid 15 milliLiter(s) Oral Mucosa every 12 hours  chlorhexidine 2% Cloths 1 Application(s) Topical <User Schedule>  CRRT Treatment    <Continuous>  dextrose 40% Gel 15 Gram(s) Oral once  dextrose 5%. 1000 milliLiter(s) (50 mL/Hr) IV Continuous <Continuous>  dextrose 5%. 1000 milliLiter(s) (100 mL/Hr) IV Continuous <Continuous>  dextrose 50% Injectable 25 Gram(s) IV Push once  dextrose 50% Injectable 25 Gram(s) IV Push once  dextrose 50% Injectable 12.5 Gram(s) IV Push once  dextrose 50% Injectable 25 Gram(s) IV Push once  folic acid Injectable 1 milliGRAM(s) IV Push daily  glucagon  Injectable 1 milliGRAM(s) IntraMuscular once  hydrocortisone sodium succinate Injectable 100 milliGRAM(s) IV Push every 8 hours  insulin lispro (ADMELOG) corrective regimen sliding scale   SubCutaneous every 6 hours  metoprolol tartrate Injectable 2.5 milliGRAM(s) IV Push every 6 hours  norepinephrine Infusion 0.05 MICROgram(s)/kG/Min (5.63 mL/Hr) IV Continuous <Continuous>  Phoxillum Filtration BK 4 / 2.5 5000 milliLiter(s) (1200 mL/Hr) CRRT <Continuous>  piperacillin/tazobactam IVPB.. 2.25 Gram(s) IV Intermittent every 6 hours  polyethylene glycol 3350 17 Gram(s) Oral daily  propofol Infusion 5 MICROgram(s)/kG/Min (1.8 mL/Hr) IV Continuous <Continuous>    MEDICATIONS  (PRN):      Allergies:  Allergies    No Known Allergies    Intolerances        Labs:                        8.3    7.10  )-----------( 56       ( 05 Jan 2021 05:25 )             24.7     01-05    135  |  101  |  15  ----------------------------<  156<H>  3.9   |  24  |  1.02    Ca    7.9<L>      05 Jan 2021 02:00  Phos  2.9     01-05  Mg     2.3     01-05    TPro  5.4<L>  /  Alb  2.6<L>  /  TBili  7.9<H>  /  DBili  6.8<H>  /  AST  342<H>  /  ALT  960<H>  /  AlkPhos  118  01-05    PT/INR - ( 05 Jan 2021 05:26 )   PT: 12.1 sec;   INR: 1.01          PTT - ( 05 Jan 2021 05:26 )  PTT:35.2 sec      Radiology and other tests:

## 2021-01-06 LAB
ALBUMIN SERPL ELPH-MCNC: 2.6 G/DL — LOW (ref 3.3–5)
ALP SERPL-CCNC: 162 U/L — HIGH (ref 40–120)
ALT FLD-CCNC: 652 U/L — HIGH (ref 10–45)
ANION GAP SERPL CALC-SCNC: 11 MMOL/L — SIGNIFICANT CHANGE UP (ref 5–17)
ANISOCYTOSIS BLD QL: SLIGHT — SIGNIFICANT CHANGE UP
APTT BLD: 31.7 SEC — SIGNIFICANT CHANGE UP (ref 27.5–35.5)
AST SERPL-CCNC: 184 U/L — HIGH (ref 10–40)
BASOPHILS # BLD AUTO: 0 K/UL — SIGNIFICANT CHANGE UP (ref 0–0.2)
BASOPHILS NFR BLD AUTO: 0 % — SIGNIFICANT CHANGE UP (ref 0–2)
BILIRUB SERPL-MCNC: 7.1 MG/DL — HIGH (ref 0.2–1.2)
BUN SERPL-MCNC: 22 MG/DL — SIGNIFICANT CHANGE UP (ref 7–23)
CALCIUM SERPL-MCNC: 8.1 MG/DL — LOW (ref 8.4–10.5)
CHLORIDE SERPL-SCNC: 100 MMOL/L — SIGNIFICANT CHANGE UP (ref 96–108)
CO2 SERPL-SCNC: 24 MMOL/L — SIGNIFICANT CHANGE UP (ref 22–31)
CONFIRM APTT STACLOT: POSITIVE
CREAT SERPL-MCNC: 1.23 MG/DL — SIGNIFICANT CHANGE UP (ref 0.5–1.3)
DRVVT SCREEN TO CONFIRM RATIO: SIGNIFICANT CHANGE UP
EOSINOPHIL # BLD AUTO: 0 K/UL — SIGNIFICANT CHANGE UP (ref 0–0.5)
EOSINOPHIL NFR BLD AUTO: 0 % — SIGNIFICANT CHANGE UP (ref 0–6)
GIANT PLATELETS BLD QL SMEAR: PRESENT — SIGNIFICANT CHANGE UP
GLUCOSE BLDC GLUCOMTR-MCNC: 190 MG/DL — HIGH (ref 70–99)
GLUCOSE BLDC GLUCOMTR-MCNC: 195 MG/DL — HIGH (ref 70–99)
GLUCOSE BLDC GLUCOMTR-MCNC: 201 MG/DL — HIGH (ref 70–99)
GLUCOSE BLDC GLUCOMTR-MCNC: 210 MG/DL — HIGH (ref 70–99)
GLUCOSE SERPL-MCNC: 209 MG/DL — HIGH (ref 70–99)
HCT VFR BLD CALC: 21.7 % — LOW (ref 39–50)
HGB BLD-MCNC: 7.6 G/DL — LOW (ref 13–17)
HYPOCHROMIA BLD QL: SLIGHT — SIGNIFICANT CHANGE UP
INR BLD: 1.06 — SIGNIFICANT CHANGE UP (ref 0.88–1.16)
LA NT DPL PPP QL: SIGNIFICANT CHANGE UP SEC
LACTATE SERPL-SCNC: 1.4 MMOL/L — SIGNIFICANT CHANGE UP (ref 0.5–2)
LYMPHOCYTES # BLD AUTO: 0.51 K/UL — LOW (ref 1–3.3)
LYMPHOCYTES # BLD AUTO: 4.3 % — LOW (ref 13–44)
MACROCYTES BLD QL: SLIGHT — SIGNIFICANT CHANGE UP
MAGNESIUM SERPL-MCNC: 2.3 MG/DL — SIGNIFICANT CHANGE UP (ref 1.6–2.6)
MANUAL SMEAR VERIFICATION: SIGNIFICANT CHANGE UP
MCHC RBC-ENTMCNC: 30.9 PG — SIGNIFICANT CHANGE UP (ref 27–34)
MCHC RBC-ENTMCNC: 35 GM/DL — SIGNIFICANT CHANGE UP (ref 32–36)
MCV RBC AUTO: 88.2 FL — SIGNIFICANT CHANGE UP (ref 80–100)
METAMYELOCYTES # FLD: 3.5 % — HIGH (ref 0–0)
MONOCYTES # BLD AUTO: 0.92 K/UL — HIGH (ref 0–0.9)
MONOCYTES NFR BLD AUTO: 7.8 % — SIGNIFICANT CHANGE UP (ref 2–14)
MYELOCYTES NFR BLD: 3.5 % — HIGH (ref 0–0)
NEUTROPHILS # BLD AUTO: 9.52 K/UL — HIGH (ref 1.8–7.4)
NEUTROPHILS NFR BLD AUTO: 80.9 % — HIGH (ref 43–77)
PHOSPHATE SERPL-MCNC: 3.4 MG/DL — SIGNIFICANT CHANGE UP (ref 2.5–4.5)
PLAT MORPH BLD: ABNORMAL
PLATELET # BLD AUTO: 48 K/UL — LOW (ref 150–400)
POLYCHROMASIA BLD QL SMEAR: SLIGHT — SIGNIFICANT CHANGE UP
POTASSIUM SERPL-MCNC: 3.7 MMOL/L — SIGNIFICANT CHANGE UP (ref 3.5–5.3)
POTASSIUM SERPL-SCNC: 3.7 MMOL/L — SIGNIFICANT CHANGE UP (ref 3.5–5.3)
PROT SERPL-MCNC: 5.3 G/DL — LOW (ref 6–8.3)
PROTHROM AB SERPL-ACNC: 12.7 SEC — SIGNIFICANT CHANGE UP (ref 10.6–13.6)
RBC # BLD: 2.46 M/UL — LOW (ref 4.2–5.8)
RBC # FLD: 14.6 % — HIGH (ref 10.3–14.5)
RBC BLD AUTO: ABNORMAL
SODIUM SERPL-SCNC: 135 MMOL/L — SIGNIFICANT CHANGE UP (ref 135–145)
WBC # BLD: 11.77 K/UL — HIGH (ref 3.8–10.5)
WBC # FLD AUTO: 11.77 K/UL — HIGH (ref 3.8–10.5)

## 2021-01-06 PROCEDURE — 99233 SBSQ HOSP IP/OBS HIGH 50: CPT | Mod: GC

## 2021-01-06 PROCEDURE — 71045 X-RAY EXAM CHEST 1 VIEW: CPT | Mod: 26

## 2021-01-06 PROCEDURE — 99291 CRITICAL CARE FIRST HOUR: CPT

## 2021-01-06 RX ORDER — DEXMEDETOMIDINE HYDROCHLORIDE IN 0.9% SODIUM CHLORIDE 4 UG/ML
0.05 INJECTION INTRAVENOUS
Qty: 200 | Refills: 0 | Status: DISCONTINUED | OUTPATIENT
Start: 2021-01-06 | End: 2021-01-08

## 2021-01-06 RX ORDER — DEXMEDETOMIDINE HYDROCHLORIDE IN 0.9% SODIUM CHLORIDE 4 UG/ML
0.05 INJECTION INTRAVENOUS
Qty: 200 | Refills: 0 | Status: DISCONTINUED | OUTPATIENT
Start: 2021-01-06 | End: 2021-01-06

## 2021-01-06 RX ORDER — HYDROCORTISONE 20 MG
50 TABLET ORAL EVERY 8 HOURS
Refills: 0 | Status: DISCONTINUED | OUTPATIENT
Start: 2021-01-06 | End: 2021-01-07

## 2021-01-06 RX ORDER — HEPARIN SODIUM 5000 [USP'U]/ML
5000 INJECTION INTRAVENOUS; SUBCUTANEOUS EVERY 8 HOURS
Refills: 0 | Status: DISCONTINUED | OUTPATIENT
Start: 2021-01-06 | End: 2021-01-09

## 2021-01-06 RX ADMIN — CHLORHEXIDINE GLUCONATE 1 APPLICATION(S): 213 SOLUTION TOPICAL at 05:05

## 2021-01-06 RX ADMIN — HEPARIN SODIUM 5000 UNIT(S): 5000 INJECTION INTRAVENOUS; SUBCUTANEOUS at 13:17

## 2021-01-06 RX ADMIN — Medication 2.5 MILLIGRAM(S): at 05:04

## 2021-01-06 RX ADMIN — Medication 1 MILLIGRAM(S): at 11:03

## 2021-01-06 RX ADMIN — Medication 2.5 MILLIGRAM(S): at 11:03

## 2021-01-06 RX ADMIN — Medication 4: at 05:05

## 2021-01-06 RX ADMIN — Medication 2: at 11:19

## 2021-01-06 RX ADMIN — Medication 50 MILLIGRAM(S): at 21:13

## 2021-01-06 RX ADMIN — POLYETHYLENE GLYCOL 3350 17 GRAM(S): 17 POWDER, FOR SOLUTION ORAL at 11:03

## 2021-01-06 RX ADMIN — PIPERACILLIN AND TAZOBACTAM 200 GRAM(S): 4; .5 INJECTION, POWDER, LYOPHILIZED, FOR SOLUTION INTRAVENOUS at 23:15

## 2021-01-06 RX ADMIN — PIPERACILLIN AND TAZOBACTAM 200 GRAM(S): 4; .5 INJECTION, POWDER, LYOPHILIZED, FOR SOLUTION INTRAVENOUS at 17:02

## 2021-01-06 RX ADMIN — PIPERACILLIN AND TAZOBACTAM 200 GRAM(S): 4; .5 INJECTION, POWDER, LYOPHILIZED, FOR SOLUTION INTRAVENOUS at 11:03

## 2021-01-06 RX ADMIN — Medication 2.5 MILLIGRAM(S): at 23:49

## 2021-01-06 RX ADMIN — Medication 100 MILLIGRAM(S): at 05:05

## 2021-01-06 RX ADMIN — PIPERACILLIN AND TAZOBACTAM 200 GRAM(S): 4; .5 INJECTION, POWDER, LYOPHILIZED, FOR SOLUTION INTRAVENOUS at 05:04

## 2021-01-06 RX ADMIN — Medication 2.5 MILLIGRAM(S): at 17:03

## 2021-01-06 RX ADMIN — Medication 4: at 23:17

## 2021-01-06 RX ADMIN — Medication 50 MILLIGRAM(S): at 13:17

## 2021-01-06 RX ADMIN — HEPARIN SODIUM 5000 UNIT(S): 5000 INJECTION INTRAVENOUS; SUBCUTANEOUS at 21:14

## 2021-01-06 RX ADMIN — PANTOPRAZOLE SODIUM 40 MILLIGRAM(S): 20 TABLET, DELAYED RELEASE ORAL at 11:04

## 2021-01-06 RX ADMIN — Medication 2: at 17:39

## 2021-01-06 RX ADMIN — CHLORHEXIDINE GLUCONATE 15 MILLILITER(S): 213 SOLUTION TOPICAL at 09:18

## 2021-01-06 RX ADMIN — CHLORHEXIDINE GLUCONATE 15 MILLILITER(S): 213 SOLUTION TOPICAL at 21:13

## 2021-01-06 NOTE — PROGRESS NOTE ADULT - ASSESSMENT
84 y/o man with no reported PMHx (possibly Afib, states not on any medications) who presented initially to [facility] ED after EMS found patient down on the street hypothermic to 77F and with altered mental status. Pt admitted to MICU for further evaluation and management of hypothermia, shock, and r/o ischemic bowel/ gastrointestinal hemorrhage.     NEURO  -weaned off propofol in AM, still very somnolent.     #Encephalopathy  Patient presented to [facility] with AMS. CTH noncontrast without acute bleed or fracture. Alcohol level elevated with hx of alcohol use. UTox negative  - pt initially with severe hypothermia, metabolic derangements, and shock likely contributing to altered mental status.   - weaned off propofol, still somnolent, not following commands  - continue to monitor mental status off sedation    CARDIOVASCULAR  #SHOCK  Pt found to be hypotensive to 80s/40s likely 2/2 to hemorrhagic/hypovolemic shock vs septic shock (unclear source) vs distributive (rewarming). Hemorrhagic source evidenced by Hgb drop from 8.2 to 5.9 with coffee grounds suctioned from sump placed. Pt with alerted mentation, low urine output, and cold to touch.   - s/p volume resuscitation with blood products as well as intermittent fluid boluses  - weaned of levo gtt  - continue w/ zosyn 2.25 IV q6h x 7 days     #Afib with RVR  - Intermittently in Afib with RVR on 1/2   - c/w lopressor 2.5 q6  - SOSA w/ Grade I left ventricular diastolic dysfunction, Aortic sclerosis without significant stenosis, Trace aortic regurgitation, Moderate tricuspid regurgitation.    PULMONARY  #Respiratory Failure  Pt not hypoxic but with tachypnea, increased WOB with use of accessory muscles. Pt intubated due to concern for eventual respiratory failure and air way protection  - Currently on vent   - trial SBT when more alert     GASTROINTESTINAL  #r/o Bowel Ischemia  Pt with Hb 5.8 upon MICU arrival, with coffee ground output from sump. In the setting of shock and low Hb, and afib noted at [facility],   -CTA A/P performed with no intestinal bleed or mesenteric ischemia  -Surgery consulted. no surgical intervention at this time     #r/o Gastrointestinal Hemorrhage  See above  - s/p IV protonix gtt and octreotide gtt  - d/c octreotide drip due to no evidence of portal HTN or cirrhosis on imagining   - c/w PPI daily  - GI consulted, f/u recs    #Acute liver failure  LFTs > 7000, with coagulopathy and Utox negative. Acetaminophen level negative. Alcohol level 26. Hx of alcohol use. Liver failure possibly 2/2 to shock liver   - c/w NAC protocol  - Vitamin K administered for coagulopathy  - monitor INR   - continue to trend LFTs    RENAL  #Anion Gap Metabolic Acidosis  Likely 2/2 lactic acidosis. AG of 28 on presentation with pH 7.19, pCO2 15, bicarb 6 on arrival to MICU. Initiated on bicarb gtt here and s/p 2 amps bicarb  - RESOLVED  - f/u Q4H BMP    #Acute Renal Failure  Pt with sCr 2.93 on presentation (unknown baseline), improved s/p fluid boluses in ED. 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 In the setting of ANTONELLA  - Resolved   - trend  bmp    INFECTIOUS DISEASE  #LLL consolidation vs atelectasis  - continue w/ zosyn 2.25 IV q6h x 7 days   - MRSA swab negative    ENDOCRINE  #Hyperglycemia  Possibly in the setting of sepsis?   -now resolved    HEME  #Normocytic Anemia   On presentation, patient with Hb 8.2, with repeat of 5.8 on MICU arrival. Now s/p 2u pRBC, with posttransfusion Hb 8.8  - s/p 2 U PRBC 12/31  - hgb stable  - trend CBC  - maintain active T&S    #Thrombocytopenia   - Plt 70 on presentation-->20s Likely 2/2 DIC in setting of septic shock versus ITP  - since admission LDH downtrending, haptoglobin increasing, fibrinogen increasing s/p 1 U cryoprecipitate and 2 U FFP supporting DIC in setting of septic shock   - Due to elevated D-Dimer will obtain US LE Venous Duplex bilaterally to r/o thrombus   - f/u peripheral flow cytometry and blue top for platelets to rule-out pseudothrombocytopenia  - Coomb's testing, anticardiolipin Ab, beta2 microglobulin, lupus anticoagulant, reticulocyte count.   - triglyceride, CD25/IL-2, NK cell activity.  - Trend LDH, haptoglobin and PT/INR/PTT daily.   - Maintain active T+S  - transfuse platelets if < 50 K with active bleed, < 20 K if febrile or < 10 K otherwise.     #Elevated INR  In the setting of acute liver failure. Repeat INR 2.39  - s/p IV vitamin K 10mg daily x 3 days per GI  - s/p 4 U FFP (01/01), 1 U Cryoprecipitate (01/01)  - Trend LDH, haptoglobin and PT/INR/PTT daily     MSK  #R pubic rim fracture  Noted on CTA abd/pelvis with adjacent hematoma without active signs of bleeding (per wet radiology read).   - Ortho consulted. currently no surgical intervention indicated at this time.      - indwelling mcbride in place    RHEUM  - per collateral obtained by PCP patient w/ hx of RA on prednisone 10 mg   - c/w hydrocortisone 100 mg q8     FLUIDS/ELECTROLYTES/NUTRITION  -IVF: none  -Monitor, Replete to K>4 and Mg>2  -Diet: jevity 1.5  PROPHYLAXIS  -DVT: SCDs  -GI ppx: protonix daily    DISPO: MICU  CODE STATUS: FULL             86 y/o man with no reported PMHx (possibly Afib, states not on any medications) who presented initially to [facility] ED after EMS found patient down on the street hypothermic to 77F and with altered mental status. Pt admitted to MICU for further evaluation and management of hypothermia, shock, and r/o ischemic bowel/ gastrointestinal hemorrhage.     NEURO  - Weaned off sedation in AM    #Encephalopathy  Patient presented to [facility] with AMS. CTH noncontrast without acute bleed or fracture. Alcohol level elevated with hx of alcohol use. UTox negative  Pt initially with severe hypothermia, metabolic derangements, and shock likely contributing to altered mental status.   - weaned off sedation 1/6, following commands  - continue to monitor mental status off sedation    CARDIOVASCULAR  #SHOCK  Pt found to be hypotensive to 80s/40s likely 2/2 to hemorrhagic/hypovolemic shock vs septic shock (unclear source) vs distributive (rewarming). Hemorrhagic source evidenced by Hgb drop from 8.2 to 5.9 with coffee grounds suctioned from sump placed. Pt with alerted mentation, low urine output, and cold to touch.   - s/p volume resuscitation with blood products as well as intermittent fluid boluses  - weaned of levo gtt  - continue w/ zosyn 2.25 IV q6h x 7 days (-1/8)    #Afib with RVR  - Intermittently in Afib with RVR on 1/2   - c/w lopressor 2.5 q6  - SOSA w/ Grade I left ventricular diastolic dysfunction, Aortic sclerosis without significant stenosis, Trace aortic regurgitation, Moderate tricuspid regurgitation.    PULMONARY  #Respiratory Failure  Pt not hypoxic but with tachypnea, increased WOB with use of accessory muscles. Pt intubated due to concern for eventual respiratory failure and air way protection  - Currently on pressure support 5/5  - Monitor on pressure support with plan to extubate     GASTROINTESTINAL  #r/o Bowel Ischemia  Pt with Hb 5.8 upon MICU arrival, with coffee ground output from sump. In the setting of shock and low Hb, and afib noted at [facility],   -CTA A/P performed with no intestinal bleed or mesenteric ischemia  -Surgery consulted. no surgical intervention at this time     #r/o Gastrointestinal Hemorrhage  See above  - s/p IV protonix gtt and octreotide gtt  - d/c octreotide drip due to no evidence of portal HTN or cirrhosis on imagining   - c/w PPI daily  - GI consulted, f/u recs    #Acute liver failure  LFTs > 7000, with coagulopathy and Utox negative. Acetaminophen level negative. Alcohol level 26. Hx of alcohol use. Liver failure possibly 2/2 to shock liver   - monitor INR   - continue to trend LFTs    RENAL  #Anion Gap Metabolic Acidosis  Likely 2/2 lactic acidosis. AG of 28 on presentation with pH 7.19, pCO2 15, bicarb 6 on arrival to MICU. Initiated on bicarb gtt here and s/p 2 amps bicarb  - RESOLVED    #Acute Renal Failure  Pt with sCr 2.93 on presentation (unknown baseline), improved s/p fluid boluses in ED. 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 In the setting of ANTONELLA  - Resolved   - trend  bmp    INFECTIOUS DISEASE  #LLL consolidation vs atelectasis  - continue w/ zosyn 2.25 IV q6h x 7 days   - MRSA swab negative    ENDOCRINE  #Hyperglycemia  Possibly in the setting of sepsis?   -now resolved    HEME  #Normocytic Anemia   On presentation, patient with Hb 8.2, with repeat of 5.8 on MICU arrival. Now s/p 2u pRBC, with posttransfusion Hb 8.8  - s/p 2 U PRBC 12/31  - hgb stable  - trend CBC  - maintain active T&S    #Thrombocytopenia   - Plt 70 on presentation-->20s Likely 2/2 DIC in setting of septic shock versus ITP  - since admission LDH downtrending, haptoglobin increasing, fibrinogen increasing s/p 1 U cryoprecipitate and 2 U FFP supporting DIC in setting of septic shock   - Per Heme/Onc,  HLH was initially a consideration given markedly elevated ferritin (30k) Send CD25/IL-2, NK cell activity.  However it is unlikely with improving LDH and transaminases have markedly improved.   - Trend LDH, haptoglobin and PT/INR/PTT daily.   - Maintain active T+S  - Transfuse platelets if < 50 K with active bleed, < 20 K if febrile or < 10 K otherwise.     #Elevated INR  In the setting of acute liver failure. Repeat INR 2.39  - s/p IV vitamin K 10mg daily x 3 days per GI  - s/p 4 U FFP (01/01), 1 U Cryoprecipitate (01/01)  - Trend LDH, haptoglobin and PT/INR/PTT daily     MSK  #R pubic rim fracture  Noted on CTA abd/pelvis with adjacent hematoma without active signs of bleeding (per wet radiology read).   - Ortho consulted. currently no surgical intervention indicated at this time.      - Indwelling mcbride in place    RHEUM  - per collateral obtained by PCP patient w/ hx of RA on prednisone 10 mg   - Decrease hydrocortisone to 50mg q8, continue to taper and ultimately transition to Prednisone 10mg    FLUIDS/ELECTROLYTES/NUTRITION  -IVF: none  -Monitor, Replete to K>4 and Mg>2  -Diet: Jevity 1.5  PROPHYLAXIS  -DVT: Heparin 500 q8h  -GI ppx: Protonix daily    DISPO: MICU  CODE STATUS: FULL   84 y/o man with no reported PMHx (possibly Afib, states not on any medications) who presented initially to [facility] ED after EMS found patient down on the street hypothermic to 77F and with altered mental status. Pt admitted to MICU for further evaluation and management of hypothermia, shock, and r/o ischemic bowel/ gastrointestinal hemorrhage.     NEURO  - Weaned off sedation in AM    #Encephalopathy  Patient presented to [facility] with AMS. CTH noncontrast without acute bleed or fracture. Alcohol level elevated with hx of alcohol use. UTox negative  Pt initially with severe hypothermia, metabolic derangements, and shock likely contributing to altered mental status.   - weaned off sedation 1/6, following commands  - continue to monitor mental status off sedation    CARDIOVASCULAR  #SHOCK  Pt found to be hypotensive to 80s/40s likely 2/2 to hemorrhagic/hypovolemic shock vs septic shock (unclear source) vs distributive (rewarming). Hemorrhagic source evidenced by Hgb drop from 8.2 to 5.9 with coffee grounds suctioned from sump placed. Pt with alerted mentation, low urine output, and cold to touch.   - s/p volume resuscitation with blood products as well as intermittent fluid boluses  - weaned of levo gtt  - continue w/ zosyn 2.25 IV q6h x 7 days (-1/8)    #Afib with RVR  - Intermittently in Afib with RVR on 1/2   - c/w lopressor 2.5 q6  - SOSA w/ Grade I left ventricular diastolic dysfunction, Aortic sclerosis without significant stenosis, Trace aortic regurgitation, Moderate tricuspid regurgitation.    PULMONARY  #Respiratory Failure  Pt not hypoxic but with tachypnea, increased WOB with use of accessory muscles. Pt intubated due to concern for eventual respiratory failure and air way protection  - Currently on pressure support 5/5  - Monitor on pressure support with plan to extubate when mental status improved     GASTROINTESTINAL  #r/o Bowel Ischemia  Pt with Hb 5.8 upon MICU arrival, with coffee ground output from sump. In the setting of shock and low Hb, and afib noted at [facility],   -CTA A/P performed with no intestinal bleed or mesenteric ischemia  -Surgery consulted. no surgical intervention at this time     #r/o Gastrointestinal Hemorrhage  See above  - s/p IV protonix gtt and octreotide gtt  - d/c octreotide drip due to no evidence of portal HTN or cirrhosis on imagining   - c/w PPI daily  - GI consulted, f/u recs    #Acute liver failure  LFTs > 7000, with coagulopathy and Utox negative. Acetaminophen level negative. Alcohol level 26. Hx of alcohol use. Liver failure possibly 2/2 to shock liver   - improving     RENAL  #Anion Gap Metabolic Acidosis  Likely 2/2 lactic acidosis. AG of 28 on presentation with pH 7.19, pCO2 15, bicarb 6 on arrival to MICU. Initiated on bicarb gtt here and s/p 2 amps bicarb  - RESOLVED    #Acute Renal Failure  Pt with sCr 2.93 on presentation (unknown baseline), improved s/p fluid boluses in ED. 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 In the setting of ANTONELLA  - Resolved   - trend  bmp    INFECTIOUS DISEASE  #LLL consolidation vs atelectasis  - continue w/ zosyn 2.25 IV q6h x 7 days   - MRSA swab negative    ENDOCRINE  #Hyperglycemia  Possibly in the setting of sepsis?   -now resolved    HEME  #Normocytic Anemia   On presentation, patient with Hb 8.2, with repeat of 5.8 on MICU arrival. Now s/p 2u pRBC, with posttransfusion Hb 8.8  - s/p 2 U PRBC 12/31  - hgb stable  - trend CBC  - maintain active T&S    #Thrombocytopenia   - Plt 70 on presentation-->20s Likely 2/2 DIC in setting of septic shock versus ITP  - since admission LDH downtrending, haptoglobin increasing, fibrinogen increasing s/p 1 U cryoprecipitate and 2 U FFP supporting DIC in setting of septic shock   - Per Heme/Onc,  HLH was initially a consideration given markedly elevated ferritin (30k) Send CD25/IL-2, NK cell activity.  However it is unlikely with improving LDH and transaminases have markedly improved.   - Trend LDH, haptoglobin and PT/INR/PTT daily.   - Maintain active T+S  - Transfuse platelets if < 50 K with active bleed, < 20 K if febrile or < 10 K otherwise.     #Elevated INR  In the setting of acute liver failure and DIC likely from hypothermia. Repeat INR 2.39  - s/p IV vitamin K 10mg daily x 3 days per GI  - s/p 4 U FFP (01/01), 1 U Cryoprecipitate (01/01)    MSK  #R pubic rim fracture  Noted on CTA abd/pelvis with adjacent hematoma without active signs of bleeding (per wet radiology read).   - Ortho consulted. currently no surgical intervention indicated at this time.      - Indwelling mcbride in place    RHEUM  - per collateral obtained by PCP patient w/ hx of RA on prednisone 10 mg   - Decrease hydrocortisone to 50mg q8, continue to taper and ultimately transition to Prednisone 10mg    FLUIDS/ELECTROLYTES/NUTRITION  -IVF: none  -Monitor, Replete to K>4 and Mg>2  -Diet: Jevity 1.5  PROPHYLAXIS  -DVT: Heparin 500 q8h  -GI ppx: Protonix daily    DISPO: MICU  CODE STATUS: FULL

## 2021-01-06 NOTE — PROGRESS NOTE ADULT - SUBJECTIVE AND OBJECTIVE BOX
Patient is a 85y Male seen and evaluated at bedside.   remains oligo-anuric on CVVHD, net even, tolerating well  BP acceptable   remains on pressors   oxygen requirements ~stable   ~2L out-put/24h; net negative ~1L/24h     Meds:    chlorhexidine 0.12% Liquid 15 every 12 hours  chlorhexidine 2% Cloths 1 <User Schedule>  CRRT Treatment  <Continuous>  dexMEDEtomidine Infusion 0.05 <Continuous>  dextrose 40% Gel 15 once  dextrose 5%. 1000 <Continuous>  dextrose 5%. 1000 <Continuous>  dextrose 50% Injectable 25 once  dextrose 50% Injectable 12.5 once  dextrose 50% Injectable 25 once  dextrose 50% Injectable 25 once  folic acid Injectable 1 daily  glucagon  Injectable 1 once  heparin   Injectable 5000 every 8 hours  hydrocortisone sodium succinate Injectable 50 every 8 hours  insulin lispro (ADMELOG) corrective regimen sliding scale  every 6 hours  metoprolol tartrate Injectable 2.5 every 6 hours  norepinephrine Infusion 0.05 <Continuous>  pantoprazole  Injectable 40 daily  Phoxillum Filtration BK 4 / 2.5 5000 <Continuous>  piperacillin/tazobactam IVPB.. 2.25 every 6 hours  polyethylene glycol 3350 17 daily      T(C): , Max: 36.9 (01-05-21 @ 21:58)  T(F): , Max: 98.5 (01-05-21 @ 21:58)  HR: 88 (01-06-21 @ 13:00)  BP: 100/56 (01-06-21 @ 13:00)  BP(mean): 72 (01-06-21 @ 13:00)  RR: 23 (01-06-21 @ 13:00)  SpO2: 100% (01-06-21 @ 13:00)  Wt(kg): --    01-05 @ 07:01  -  01-06 @ 07:00  --------------------------------------------------------  IN: 1033.6 mL / OUT: 1950 mL / NET: -916.4 mL    01-06 @ 07:01  -  01-06 @ 13:29  --------------------------------------------------------  IN: 357 mL / OUT: 403 mL / NET: -46 mL                Review of Systems:  unable to obtain    PHYSICAL EXAM:  GENERAL: intubated, sedated, FiO2 35%   CHEST/LUNG: Bilateral clear breath sounds  HEART: Regular rate and rhythm, no murmur, no gallops, no rub   ABDOMEN: Soft, nontender, non distended  EXTREMITIES: no pedal edema  ACCESS: R fem temp hemodialysis catheter         LABS:                        7.6    11.77 )-----------( 48       ( 06 Jan 2021 04:15 )             21.7     01-06    135  |  100  |  22  ----------------------------<  209<H>  3.7   |  24  |  1.23    Ca    8.1<L>      06 Jan 2021 04:15  Phos  3.4     01-06  Mg     2.3     01-06    TPro  5.3<L>  /  Alb  2.6<L>  /  TBili  7.1<H>  /  DBili  x   /  AST  184<H>  /  ALT  652<H>  /  AlkPhos  162<H>  01-06      PT/INR - ( 06 Jan 2021 04:14 )   PT: 12.7 sec;   INR: 1.06          PTT - ( 06 Jan 2021 04:14 )  PTT:31.7 sec    Sodium, Random Urine: 21 mmol/L (01-05 @ 09:54)  Creatinine, Random Urine: 32 mg/dL (01-05 @ 09:54)        RADIOLOGY & ADDITIONAL STUDIES:

## 2021-01-06 NOTE — PROGRESS NOTE ADULT - SUBJECTIVE AND OBJECTIVE BOX
*INCOMPLETE NOTE*  Patient is a 85y old  Male who presents with a chief complaint of AMS, hypothermic, (05 Jan 2021 13:56)    INTERVAL HPI/OVERNIGHT EVENTS:   No overnight events     ICU Vital Signs Last 24 Hrs  T(C): 36.9 (06 Jan 2021 06:00), Max: 36.9 (05 Jan 2021 21:58)  T(F): 98.5 (06 Jan 2021 06:00), Max: 98.5 (05 Jan 2021 21:58)  HR: 82 (06 Jan 2021 12:11) (68 - 99)  BP: 108/55 (06 Jan 2021 11:00) (90/51 - 133/77)  BP(mean): 73 (06 Jan 2021 11:00) (65 - 94)  ABP: 120/56 (06 Jan 2021 12:00) (91/47 - 140/65)  ABP(mean): 77 (06 Jan 2021 12:00) (63 - 91)  RR: 23 (06 Jan 2021 12:00) (16 - 28)  SpO2: 100% (06 Jan 2021 12:11) (92% - 100%)    I&O's Summary    05 Jan 2021 07:01  -  06 Jan 2021 07:00  --------------------------------------------------------  IN: 1033.6 mL / OUT: 1950 mL / NET: -916.4 mL    06 Jan 2021 07:01  -  06 Jan 2021 12:42  --------------------------------------------------------  IN: 269 mL / OUT: 305 mL / NET: -36 mL      Mode: CPAP with PS  FiO2: 35  PEEP: 5  PS: 5  MAP: 7.4  PIP: 10    LABS:                        7.6    11.77 )-----------( 48       ( 06 Jan 2021 04:15 )             21.7     01-06    135  |  100  |  22  ----------------------------<  209<H>  3.7   |  24  |  1.23    Ca    8.1<L>      06 Jan 2021 04:15  Phos  3.4     01-06  Mg     2.3     01-06    TPro  5.3<L>  /  Alb  2.6<L>  /  TBili  7.1<H>  /  DBili  x   /  AST  184<H>  /  ALT  652<H>  /  AlkPhos  162<H>  01-06    PT/INR - ( 06 Jan 2021 04:14 )   PT: 12.7 sec;   INR: 1.06        PTT - ( 06 Jan 2021 04:14 )  PTT:31.7 sec    CAPILLARY BLOOD GLUCOSE    POCT Blood Glucose.: 190 mg/dL (06 Jan 2021 11:14)  POCT Blood Glucose.: 210 mg/dL (06 Jan 2021 05:01)  POCT Blood Glucose.: 111 mg/dL (05 Jan 2021 23:05)  POCT Blood Glucose.: 152 mg/dL (05 Jan 2021 17:49)    RADIOLOGY & ADDITIONAL TESTS: REVIEWED     Consultant(s) Notes Reviewed:  [x ] YES  [ ] NO    MEDICATIONS  (STANDING):  chlorhexidine 0.12% Liquid 15 milliLiter(s) Oral Mucosa every 12 hours  chlorhexidine 2% Cloths 1 Application(s) Topical <User Schedule>  CRRT Treatment    <Continuous>  dexMEDEtomidine Infusion 0.05 MICROgram(s)/kG/Hr (0.75 mL/Hr) IV Continuous <Continuous>  dextrose 40% Gel 15 Gram(s) Oral once  dextrose 5%. 1000 milliLiter(s) (50 mL/Hr) IV Continuous <Continuous>  dextrose 5%. 1000 milliLiter(s) (100 mL/Hr) IV Continuous <Continuous>  dextrose 50% Injectable 25 Gram(s) IV Push once  dextrose 50% Injectable 12.5 Gram(s) IV Push once  dextrose 50% Injectable 25 Gram(s) IV Push once  dextrose 50% Injectable 25 Gram(s) IV Push once  folic acid Injectable 1 milliGRAM(s) IV Push daily  glucagon  Injectable 1 milliGRAM(s) IntraMuscular once  heparin   Injectable 5000 Unit(s) SubCutaneous every 8 hours  hydrocortisone sodium succinate Injectable 50 milliGRAM(s) IV Push every 8 hours  insulin lispro (ADMELOG) corrective regimen sliding scale   SubCutaneous every 6 hours  metoprolol tartrate Injectable 2.5 milliGRAM(s) IV Push every 6 hours  norepinephrine Infusion 0.05 MICROgram(s)/kG/Min (5.63 mL/Hr) IV Continuous <Continuous>  pantoprazole  Injectable 40 milliGRAM(s) IV Push daily  Phoxillum Filtration BK 4 / 2.5 5000 milliLiter(s) (1200 mL/Hr) CRRT <Continuous>  piperacillin/tazobactam IVPB.. 2.25 Gram(s) IV Intermittent every 6 hours  polyethylene glycol 3350 17 Gram(s) Oral daily    MEDICATIONS  (PRN):      PHYSICAL EXAM:  GENERAL: Sedated on a ventilator  HEAD:  Atraumatic, Normocephalic  EYES: EOMI, PERRLA, conjunctiva and sclera clear  NECK: Supple, No JVD, Normal thyroid, no enlarged nodes  NERVOUS SYSTEM:  Alert & Awake.   CHEST/LUNG: B/L good air entry; No rales, rhonchi, or wheezing  HEART: S1S2 normal, no S3, Regular rate and rhythm; No murmurs  ABDOMEN: Soft, Nontender, Nondistended; Bowel sounds present  EXTREMITIES:  2+ Peripheral Pulses, No clubbing, cyanosis, or edema  LYMPH: No lymphadenopathy noted  SKIN: No rashes or lesions    Care Discussed with Consultants/Other Providers [ x] YES  [ ] NO Patient is a 85y old  Male who presents with a chief complaint of AMS, hypothermic, (05 Jan 2021 13:56)    INTERVAL HPI/OVERNIGHT EVENTS: No acute overnight events.    Patient seen and examined at bedside. Patient was able to follow commands on minimal sedation.     ICU Vital Signs Last 24 Hrs  T(C): 36.9 (06 Jan 2021 06:00), Max: 36.9 (05 Jan 2021 21:58)  T(F): 98.5 (06 Jan 2021 06:00), Max: 98.5 (05 Jan 2021 21:58)  HR: 82 (06 Jan 2021 12:11) (68 - 99)  BP: 108/55 (06 Jan 2021 11:00) (90/51 - 133/77)  BP(mean): 73 (06 Jan 2021 11:00) (65 - 94)  ABP: 120/56 (06 Jan 2021 12:00) (91/47 - 140/65)  ABP(mean): 77 (06 Jan 2021 12:00) (63 - 91)  RR: 23 (06 Jan 2021 12:00) (16 - 28)  SpO2: 100% (06 Jan 2021 12:11) (92% - 100%)    I&O's Summary    05 Jan 2021 07:01  -  06 Jan 2021 07:00  --------------------------------------------------------  IN: 1033.6 mL / OUT: 1950 mL / NET: -916.4 mL    06 Jan 2021 07:01  -  06 Jan 2021 12:42  --------------------------------------------------------  IN: 269 mL / OUT: 305 mL / NET: -36 mL      Mode: CPAP with PS  FiO2: 35  PEEP: 5  PS: 5  MAP: 7.4  PIP: 10    LABS:                        7.6    11.77 )-----------( 48       ( 06 Jan 2021 04:15 )             21.7     01-06    135  |  100  |  22  ----------------------------<  209<H>  3.7   |  24  |  1.23    Ca    8.1<L>      06 Jan 2021 04:15  Phos  3.4     01-06  Mg     2.3     01-06    TPro  5.3<L>  /  Alb  2.6<L>  /  TBili  7.1<H>  /  DBili  x   /  AST  184<H>  /  ALT  652<H>  /  AlkPhos  162<H>  01-06    PT/INR - ( 06 Jan 2021 04:14 )   PT: 12.7 sec;   INR: 1.06        PTT - ( 06 Jan 2021 04:14 )  PTT:31.7 sec    CAPILLARY BLOOD GLUCOSE    POCT Blood Glucose.: 190 mg/dL (06 Jan 2021 11:14)  POCT Blood Glucose.: 210 mg/dL (06 Jan 2021 05:01)  POCT Blood Glucose.: 111 mg/dL (05 Jan 2021 23:05)  POCT Blood Glucose.: 152 mg/dL (05 Jan 2021 17:49)    RADIOLOGY & ADDITIONAL TESTS: REVIEWED     Consultant(s) Notes Reviewed:  [x ] YES  [ ] NO    MEDICATIONS  (STANDING):  chlorhexidine 0.12% Liquid 15 milliLiter(s) Oral Mucosa every 12 hours  chlorhexidine 2% Cloths 1 Application(s) Topical <User Schedule>  CRRT Treatment    <Continuous>  dexMEDEtomidine Infusion 0.05 MICROgram(s)/kG/Hr (0.75 mL/Hr) IV Continuous <Continuous>  dextrose 40% Gel 15 Gram(s) Oral once  dextrose 5%. 1000 milliLiter(s) (50 mL/Hr) IV Continuous <Continuous>  dextrose 5%. 1000 milliLiter(s) (100 mL/Hr) IV Continuous <Continuous>  dextrose 50% Injectable 25 Gram(s) IV Push once  dextrose 50% Injectable 12.5 Gram(s) IV Push once  dextrose 50% Injectable 25 Gram(s) IV Push once  dextrose 50% Injectable 25 Gram(s) IV Push once  folic acid Injectable 1 milliGRAM(s) IV Push daily  glucagon  Injectable 1 milliGRAM(s) IntraMuscular once  heparin   Injectable 5000 Unit(s) SubCutaneous every 8 hours  hydrocortisone sodium succinate Injectable 50 milliGRAM(s) IV Push every 8 hours  insulin lispro (ADMELOG) corrective regimen sliding scale   SubCutaneous every 6 hours  metoprolol tartrate Injectable 2.5 milliGRAM(s) IV Push every 6 hours  norepinephrine Infusion 0.05 MICROgram(s)/kG/Min (5.63 mL/Hr) IV Continuous <Continuous>  pantoprazole  Injectable 40 milliGRAM(s) IV Push daily  Phoxillum Filtration BK 4 / 2.5 5000 milliLiter(s) (1200 mL/Hr) CRRT <Continuous>  piperacillin/tazobactam IVPB.. 2.25 Gram(s) IV Intermittent every 6 hours  polyethylene glycol 3350 17 Gram(s) Oral daily    MEDICATIONS  (PRN):      PHYSICAL EXAM  General: Intubated and minimally sedated, does not appear in any distress  HEENT: NC/AT; PERRL, anicteric sclera; MMM  Neck: Supple, no JVP  Cardiovascular: +S1/S2; RRR  Respiratory: Course breath sounds b/l  Gastrointestinal: soft, NT/ND; +BSx4  Extremities: LE with trace dependent edema  Vascular: 2+ radial, DP/PT pulses B/L  Neurological: Able to follow commands, hand  strength 3/5      Care Discussed with Consultants/Other Providers [ x] YES  [ ] NO

## 2021-01-06 NOTE — PROGRESS NOTE ADULT - ASSESSMENT
85M no reported PMHx who presented initially to [facility] ED after EMS found patient down on the street hypothermic to 77F and with altered mental status. Found to have ANTONELLA and hyperK. Nephrology consulted for ANTONELLA and hyperkalemia.      Assessment/Plan:   #hyperkalemia  #oligo-anuric ANTONELLA on CKD vs ANTONELLA   #transaminitis, shock liver   #coagulopathy  #thrombocytopenia   multi-organ failure, unclear precipitant   unclear baseline creatinine   etiology of ANTONELLA likely intra-renal ischemic ATN     Recommend:   continue with CVVHD net even fluid balance, 2K, 1.2L DFR,    q6h BMP Mg Phos while on CVVHD   daily CXR    renal sono consistent with ANTONELLA   strict I/Os   renal diet     Patient was seen and evaluated on dialysis.     Hemodynamically stable.     CVVHD:   QB: 250 mL/min   Dialysis Fluid Rate: 1.2L/h   Prescribed UF set to achieve net even   Actual UF: 100mL/hr     Patient is tolerating the procedure well.   Continue CVVHD treatment as prescribed.    Thank you for the opportunity to participate in the care of your patient. The nephrology service remains available to assist with any questions or concerns. Please feel free to reach us by paging the on-call nephrology fellow for urgent issues or as below.     Ronnell Stout M.D.   PGY-4, Nephrology Fellow   C: [phone]   P: [phone]

## 2021-01-07 DIAGNOSIS — R53.2 FUNCTIONAL QUADRIPLEGIA: ICD-10-CM

## 2021-01-07 DIAGNOSIS — J96.01 ACUTE RESPIRATORY FAILURE WITH HYPOXIA: ICD-10-CM

## 2021-01-07 DIAGNOSIS — Z51.5 ENCOUNTER FOR PALLIATIVE CARE: ICD-10-CM

## 2021-01-07 DIAGNOSIS — A41.9 SEPSIS, UNSPECIFIED ORGANISM: ICD-10-CM

## 2021-01-07 LAB
ANION GAP SERPL CALC-SCNC: 13 MMOL/L — SIGNIFICANT CHANGE UP (ref 5–17)
ANISOCYTOSIS BLD QL: SLIGHT — SIGNIFICANT CHANGE UP
APTT BLD: 33.5 SEC — SIGNIFICANT CHANGE UP (ref 27.5–35.5)
BASOPHILS # BLD AUTO: 0 K/UL — SIGNIFICANT CHANGE UP (ref 0–0.2)
BASOPHILS NFR BLD AUTO: 0 % — SIGNIFICANT CHANGE UP (ref 0–2)
BUN SERPL-MCNC: 36 MG/DL — HIGH (ref 7–23)
BURR CELLS BLD QL SMEAR: PRESENT — SIGNIFICANT CHANGE UP
CALCIUM SERPL-MCNC: 7.7 MG/DL — LOW (ref 8.4–10.5)
CD3-/CD16+CD56+(%): 2 % — LOW (ref 4–25)
CD3-CD16+CD56+(ABS): 8 CELLS/UL — LOW (ref 70–760)
CHLORIDE SERPL-SCNC: 102 MMOL/L — SIGNIFICANT CHANGE UP (ref 96–108)
CO2 SERPL-SCNC: 20 MMOL/L — LOW (ref 22–31)
CREAT SERPL-MCNC: 1.51 MG/DL — HIGH (ref 0.5–1.3)
EOSINOPHIL # BLD AUTO: 0 K/UL — SIGNIFICANT CHANGE UP (ref 0–0.5)
EOSINOPHIL NFR BLD AUTO: 0 % — SIGNIFICANT CHANGE UP (ref 0–6)
GIANT PLATELETS BLD QL SMEAR: PRESENT — SIGNIFICANT CHANGE UP
GLUCOSE BLDC GLUCOMTR-MCNC: 116 MG/DL — HIGH (ref 70–99)
GLUCOSE BLDC GLUCOMTR-MCNC: 203 MG/DL — HIGH (ref 70–99)
GLUCOSE BLDC GLUCOMTR-MCNC: 96 MG/DL — SIGNIFICANT CHANGE UP (ref 70–99)
GLUCOSE SERPL-MCNC: 229 MG/DL — HIGH (ref 70–99)
HAPTOGLOB SERPL-MCNC: 90 MG/DL — SIGNIFICANT CHANGE UP (ref 34–200)
HCT VFR BLD CALC: 21.5 % — LOW (ref 39–50)
HGB BLD-MCNC: 7.1 G/DL — LOW (ref 13–17)
HYPOCHROMIA BLD QL: SLIGHT — SIGNIFICANT CHANGE UP
IL2 SERPL-MCNC: 1353.1 PG/ML — HIGH (ref 175.3–858.2)
INR BLD: 1.07 — SIGNIFICANT CHANGE UP (ref 0.88–1.16)
LDH SERPL L TO P-CCNC: 440 U/L — HIGH (ref 50–242)
LYMPHOCYTES # BLD AUTO: 0.44 K/UL — LOW (ref 1–3.3)
LYMPHOCYTES # BLD AUTO: 2.7 % — LOW (ref 13–44)
LYMPHOCYTES, ABSOLUTE: 330 CELLS/UL — LOW (ref 850–3900)
MACROCYTES BLD QL: SLIGHT — SIGNIFICANT CHANGE UP
MANUAL SMEAR VERIFICATION: SIGNIFICANT CHANGE UP
MCHC RBC-ENTMCNC: 29.5 PG — SIGNIFICANT CHANGE UP (ref 27–34)
MCHC RBC-ENTMCNC: 33 GM/DL — SIGNIFICANT CHANGE UP (ref 32–36)
MCV RBC AUTO: 89.2 FL — SIGNIFICANT CHANGE UP (ref 80–100)
METAMYELOCYTES # FLD: 0.9 % — HIGH (ref 0–0)
MONOCYTES # BLD AUTO: 1.88 K/UL — HIGH (ref 0–0.9)
MONOCYTES NFR BLD AUTO: 11.6 % — SIGNIFICANT CHANGE UP (ref 2–14)
NEUTROPHILS # BLD AUTO: 13.76 K/UL — HIGH (ref 1.8–7.4)
NEUTROPHILS NFR BLD AUTO: 84.8 % — HIGH (ref 43–77)
NRBC # BLD: 0 /100 WBCS — SIGNIFICANT CHANGE UP (ref 0–0)
OVALOCYTES BLD QL SMEAR: SLIGHT — SIGNIFICANT CHANGE UP
PLAT MORPH BLD: ABNORMAL
PLATELET # BLD AUTO: 45 K/UL — LOW (ref 150–400)
POIKILOCYTOSIS BLD QL AUTO: SLIGHT — SIGNIFICANT CHANGE UP
POTASSIUM SERPL-MCNC: 3.9 MMOL/L — SIGNIFICANT CHANGE UP (ref 3.5–5.3)
POTASSIUM SERPL-SCNC: 3.9 MMOL/L — SIGNIFICANT CHANGE UP (ref 3.5–5.3)
PROTHROM AB SERPL-ACNC: 12.8 SEC — SIGNIFICANT CHANGE UP (ref 10.6–13.6)
RBC # BLD: 2.41 M/UL — LOW (ref 4.2–5.8)
RBC # FLD: 14.8 % — HIGH (ref 10.3–14.5)
RBC BLD AUTO: ABNORMAL
SODIUM SERPL-SCNC: 135 MMOL/L — SIGNIFICANT CHANGE UP (ref 135–145)
TARGETS BLD QL SMEAR: SLIGHT — SIGNIFICANT CHANGE UP
WBC # BLD: 16.23 K/UL — HIGH (ref 3.8–10.5)
WBC # FLD AUTO: 16.23 K/UL — HIGH (ref 3.8–10.5)

## 2021-01-07 PROCEDURE — 99291 CRITICAL CARE FIRST HOUR: CPT

## 2021-01-07 PROCEDURE — 99233 SBSQ HOSP IP/OBS HIGH 50: CPT | Mod: GC

## 2021-01-07 PROCEDURE — 71045 X-RAY EXAM CHEST 1 VIEW: CPT | Mod: 26

## 2021-01-07 RX ORDER — HYDROCORTISONE 20 MG
50 TABLET ORAL ONCE
Refills: 0 | Status: COMPLETED | OUTPATIENT
Start: 2021-01-07 | End: 2021-01-07

## 2021-01-07 RX ORDER — IPRATROPIUM/ALBUTEROL SULFATE 18-103MCG
3 AEROSOL WITH ADAPTER (GRAM) INHALATION EVERY 6 HOURS
Refills: 0 | Status: DISCONTINUED | OUTPATIENT
Start: 2021-01-07 | End: 2021-01-13

## 2021-01-07 RX ORDER — HYDROCORTISONE 20 MG
50 TABLET ORAL EVERY 12 HOURS
Refills: 0 | Status: COMPLETED | OUTPATIENT
Start: 2021-01-08 | End: 2021-01-09

## 2021-01-07 RX ADMIN — Medication 50 MILLIGRAM(S): at 13:27

## 2021-01-07 RX ADMIN — Medication 3 MILLILITER(S): at 16:50

## 2021-01-07 RX ADMIN — Medication 50 MILLIGRAM(S): at 06:43

## 2021-01-07 RX ADMIN — Medication 2.5 MILLIGRAM(S): at 11:39

## 2021-01-07 RX ADMIN — PIPERACILLIN AND TAZOBACTAM 200 GRAM(S): 4; .5 INJECTION, POWDER, LYOPHILIZED, FOR SOLUTION INTRAVENOUS at 11:39

## 2021-01-07 RX ADMIN — CHLORHEXIDINE GLUCONATE 1 APPLICATION(S): 213 SOLUTION TOPICAL at 06:53

## 2021-01-07 RX ADMIN — Medication 4: at 06:43

## 2021-01-07 RX ADMIN — HEPARIN SODIUM 5000 UNIT(S): 5000 INJECTION INTRAVENOUS; SUBCUTANEOUS at 06:43

## 2021-01-07 RX ADMIN — HEPARIN SODIUM 5000 UNIT(S): 5000 INJECTION INTRAVENOUS; SUBCUTANEOUS at 21:58

## 2021-01-07 RX ADMIN — Medication 2.5 MILLIGRAM(S): at 23:14

## 2021-01-07 RX ADMIN — PIPERACILLIN AND TAZOBACTAM 200 GRAM(S): 4; .5 INJECTION, POWDER, LYOPHILIZED, FOR SOLUTION INTRAVENOUS at 06:43

## 2021-01-07 RX ADMIN — PIPERACILLIN AND TAZOBACTAM 200 GRAM(S): 4; .5 INJECTION, POWDER, LYOPHILIZED, FOR SOLUTION INTRAVENOUS at 23:14

## 2021-01-07 RX ADMIN — PIPERACILLIN AND TAZOBACTAM 200 GRAM(S): 4; .5 INJECTION, POWDER, LYOPHILIZED, FOR SOLUTION INTRAVENOUS at 18:04

## 2021-01-07 RX ADMIN — PANTOPRAZOLE SODIUM 40 MILLIGRAM(S): 20 TABLET, DELAYED RELEASE ORAL at 11:39

## 2021-01-07 RX ADMIN — Medication 2.5 MILLIGRAM(S): at 18:04

## 2021-01-07 RX ADMIN — Medication 1 MILLIGRAM(S): at 11:41

## 2021-01-07 RX ADMIN — Medication 50 MILLIGRAM(S): at 21:58

## 2021-01-07 RX ADMIN — HEPARIN SODIUM 5000 UNIT(S): 5000 INJECTION INTRAVENOUS; SUBCUTANEOUS at 13:27

## 2021-01-07 NOTE — PROGRESS NOTE ADULT - ASSESSMENT
86 y/o man with no reported PMHx (possibly Afib, states not on any medications) who presented initially to [facility] ED after EMS found patient down on the street hypothermic to 77F and with altered mental status. Pt admitted to MICU for further evaluation and management of hypothermia, shock, and r/o ischemic bowel/ gastrointestinal hemorrhage.     NEURO  - Weaned off sedation in AM    #Encephalopathy  Patient presented to [facility] with AMS. CTH noncontrast without acute bleed or fracture. Alcohol level elevated with hx of alcohol use. UTox negative  Pt initially with severe hypothermia, metabolic derangements, and shock likely contributing to altered mental status.   NOW RESOLVED   - Weaned off sedation 1/7, following commands, mentating well     CARDIOVASCULAR  #SHOCK  Pt found to be hypotensive to 80s/40s likely 2/2 to hemorrhagic/hypovolemic shock vs septic shock (unclear source) vs distributive (rewarming). Hemorrhagic source evidenced by Hgb drop from 8.2 to 5.9 with coffee grounds suctioned from sump placed. Pt with alerted mentation, low urine output, and cold to touch. s/p volume resuscitation with blood products as well as intermittent fluid boluses  - Weaned of levo gtt 1/7   - Continue w/ zosyn 2.25 IV q6h x 7 days (-1/8)    #Afib with RVR  - Intermittently in Afib with RVR on 1/2   - SOSA w/ Grade I left ventricular diastolic dysfunction, Aortic sclerosis without significant stenosis, Trace aortic regurgitation, Moderate tricuspid regurgitation.  - c/w lopressor 2.5 q6    PULMONARY  #Respiratory Failure  Pt not hypoxic but with tachypnea, increased WOB with use of accessory muscles. Pt intubated due to concern for eventual respiratory failure and air way protection  - EXTUBATED 1/7 without complications  - Currently on 2L NC satting >95%    GASTROINTESTINAL  #r/o Bowel Ischemia  Pt with Hb 5.8 upon MICU arrival, with coffee ground output from sump. In the setting of shock and low Hb, and afib noted at [facility],   - CTA A/P performed with no intestinal bleed or mesenteric ischemia  - Surgery consulted. no surgical intervention at this time     #r/o Gastrointestinal Hemorrhage  See above  - s/p IV protonix gtt and octreotide gtt  - d/c octreotide drip due to no evidence of portal HTN or cirrhosis on imagining   - c/w PPI daily    #Acute liver failure  LFTs > 7000, with coagulopathy and Utox negative. Acetaminophen level negative. Alcohol level 26. Hx of alcohol use. Liver failure possibly 2/2 to shock liver   - IMPROVING     RENAL  #Anion Gap Metabolic Acidosis  Likely 2/2 lactic acidosis. AG of 28 on presentation with pH 7.19, pCO2 15, bicarb 6 on arrival to MICU. Initiated on bicarb gtt here and s/p 2 amps bicarb  - RESOLVED    #Acute Renal Failure  Pt with sCr 2.93 on presentation (unknown baseline), improved s/p fluid boluses in ED. However now trending back up to 2.26  - Renal sono consistent with ANTONELLA   - s/p HD cath placement 1/1   - New HD cath placed 1/4 in L IJ and confirmed to be in L accessory vein leading to SVC    - CVVHD stopped 1/7  - Intermittent HD prn   - Continue with bladder scans     #Hyperkalemia   K+5.5 on arrival at MICU with repeat 6.5. In the setting of ANTONELLA  - Resolved     INFECTIOUS DISEASE  #LLL consolidation vs atelectasis  - Continue w/ zosyn 2.25 IV q6h x 7 days   - MRSA swab negative    ENDOCRINE  - No active issues     HEME  #Normocytic Anemia   On presentation, patient with Hb 8.2, with repeat of 5.8 on MICU arrival. Now s/p 2u pRBC, with posttransfusion Hb 8.8. s/p 2 U PRBC 12/31  - Hgb stable  - Trend CBC  - Maintain active T&S    #Thrombocytopenia   Plt 70 on presentation-->20s Likely 2/2 DIC in setting of septic shock versus ITP. Per Heme/Onc, HLH was initially a consideration given markedly elevated ferritin (30k) Send CD25/IL-2, NK cell activity.  However it is unlikely with improving LDH and transaminases have markedly improved.   - Continue to monitor CBC    #Elevated INR  In the setting of acute liver failure and DIC likely from hypothermia. Repeat INR 2.39  - s/p IV vitamin K 10mg daily x 3 days per GI  - s/p 4 U FFP (01/01), 1 U Cryoprecipitate (01/01)    MSK  #R pubic rim fracture  Noted on CTA abd/pelvis with adjacent hematoma without active signs of bleeding.  - Ortho consulted. Currently no surgical intervention indicated at this time.      - Madden removed     RHEUM  - Per collateral obtained by PCP patient w/ hx of RA on prednisone 10 mg   - Continue hydrocortisone to 50mg q12h, taper to 50mg daily for 2 days 1/8, 1/9, then continue on home Prednisone 10mg    FLUIDS/ELECTROLYTES/NUTRITION  -IVF: None  -Monitor, Replete to K>4 and Mg>2  -Diet: Tolerating diet   PROPHYLAXIS  -DVT: Heparin 500 q8h  -GI ppx: Protonix daily  DISPO: MICU  CODE STATUS: FULL

## 2021-01-07 NOTE — PROGRESS NOTE ADULT - SUBJECTIVE AND OBJECTIVE BOX
Patient is a 85y Male seen and evaluated at bedside.   remains ~oliguric on CVVHD, net even, tolerating well  BP acceptable   now off pressors   extubated to aerosol mask today   ~2L out-put/24h; 500cc/24h UOP; net negative ~1L/24h     Meds:    chlorhexidine 0.12% Liquid 15 every 12 hours  chlorhexidine 2% Cloths 1 <User Schedule>  dexMEDEtomidine Infusion 0.05 <Continuous>  dextrose 40% Gel 15 once  dextrose 5%. 1000 <Continuous>  dextrose 5%. 1000 <Continuous>  dextrose 50% Injectable 25 once  dextrose 50% Injectable 12.5 once  dextrose 50% Injectable 25 once  dextrose 50% Injectable 25 once  folic acid Injectable 1 daily  glucagon  Injectable 1 once  heparin   Injectable 5000 every 8 hours  hydrocortisone sodium succinate Injectable 50 every 8 hours  insulin lispro (ADMELOG) corrective regimen sliding scale  every 6 hours  metoprolol tartrate Injectable 2.5 every 6 hours  norepinephrine Infusion 0.05 <Continuous>  pantoprazole  Injectable 40 daily  piperacillin/tazobactam IVPB.. 2.25 every 6 hours  polyethylene glycol 3350 17 daily      T(C): , Max: 37.3 (01-06-21 @ 14:00)  T(F): , Max: 99.2 (01-06-21 @ 18:29)  HR: 72 (01-07-21 @ 12:00)  BP: 102/51 (01-07-21 @ 12:00)  BP(mean): 73 (01-07-21 @ 12:00)  RR: 15 (01-07-21 @ 12:00)  SpO2: 100% (01-07-21 @ 12:00)  Wt(kg): --    01-06 @ 07:01  -  01-07 @ 07:00  --------------------------------------------------------  IN: 1296.8 mL / OUT: 2072 mL / NET: -775.2 mL    01-07 @ 07:01  -  01-07 @ 12:49  --------------------------------------------------------  IN: 102 mL / OUT: 110 mL / NET: -8 mL            Review of Systems:  alert, not answering questions appropriately     PHYSICAL EXAM:  GENERAL: on aerosol mask, NAD, alert but disoriented   CHEST/LUNG: Bilateral clear breath sounds  HEART: Regular rate and rhythm, no murmur, no gallops, no rub   ABDOMEN: Soft, nontender, non distended  EXTREMITIES: no pedal edema  ACCESS: R fem temp hemodialysis catheter         LABS:                        7.1    16.23 )-----------( 45       ( 07 Jan 2021 05:37 )             21.5     01-07    135  |  102  |  36<H>  ----------------------------<  229<H>  3.9   |  20<L>  |  1.51<H>    Ca    7.7<L>      07 Jan 2021 05:36  Phos  3.4     01-06  Mg     2.3     01-06    TPro  5.3<L>  /  Alb  2.6<L>  /  TBili  7.1<H>  /  DBili  x   /  AST  184<H>  /  ALT  652<H>  /  AlkPhos  162<H>  01-06      PT/INR - ( 07 Jan 2021 05:36 )   PT: 12.8 sec;   INR: 1.07          PTT - ( 07 Jan 2021 05:36 )  PTT:33.5 sec          RADIOLOGY & ADDITIONAL STUDIES:

## 2021-01-07 NOTE — PROGRESS NOTE ADULT - SUBJECTIVE AND OBJECTIVE BOX
**INCOMPLETE**    Patient is a 85y old  Male who presents with a chief complaint of AMS, hypothermic, (07 Jan 2021 16:25)    INTERVAL HPI/OVERNIGHT EVENTS:   No overnight events   Afebrile, hemodynamically stable     ICU Vital Signs Last 24 Hrs  T(C): 36.9 (07 Jan 2021 13:52), Max: 37.3 (06 Jan 2021 18:29)  T(F): 98.5 (07 Jan 2021 13:52), Max: 99.2 (06 Jan 2021 18:29)  HR: 84 (07 Jan 2021 16:00) (62 - 98)  BP: 119/54 (07 Jan 2021 16:00) (93/50 - 157/78)  BP(mean): 78 (07 Jan 2021 16:00) (66 - 109)  ABP: 136/55 (07 Jan 2021 16:00) (94/47 - 171/83)  ABP(mean): 79 (07 Jan 2021 16:00) (63 - 114)  RR: 19 (07 Jan 2021 16:00) (15 - 26)  SpO2: 100% (07 Jan 2021 16:00) (96% - 100%)    I&O's Summary    06 Jan 2021 07:01  -  07 Jan 2021 07:00  --------------------------------------------------------  IN: 1296.8 mL / OUT: 2072 mL / NET: -775.2 mL    07 Jan 2021 07:01  -  07 Jan 2021 16:29  --------------------------------------------------------  IN: 102 mL / OUT: 110 mL / NET: -8 mL      Mode: standby    LABS:                        7.1    16.23 )-----------( 45       ( 07 Jan 2021 05:37 )             21.5     01-07    135  |  102  |  36<H>  ----------------------------<  229<H>  3.9   |  20<L>  |  1.51<H>    Ca    7.7<L>      07 Jan 2021 05:36  Phos  3.4     01-06  Mg     2.3     01-06    TPro  5.3<L>  /  Alb  2.6<L>  /  TBili  7.1<H>  /  DBili  x   /  AST  184<H>  /  ALT  652<H>  /  AlkPhos  162<H>  01-06    PT/INR - ( 07 Jan 2021 05:36 )   PT: 12.8 sec;   INR: 1.07          PTT - ( 07 Jan 2021 05:36 )  PTT:33.5 sec    CAPILLARY BLOOD GLUCOSE      POCT Blood Glucose.: 116 mg/dL (07 Jan 2021 11:39)  POCT Blood Glucose.: 203 mg/dL (07 Jan 2021 06:31)  POCT Blood Glucose.: 201 mg/dL (06 Jan 2021 23:06)  POCT Blood Glucose.: 195 mg/dL (06 Jan 2021 17:37)    RADIOLOGY & ADDITIONAL TESTS:    Consultant(s) Notes Reviewed:  [x ] YES  [ ] NO    MEDICATIONS  (STANDING):  albuterol/ipratropium for Nebulization 3 milliLiter(s) Nebulizer every 6 hours  chlorhexidine 0.12% Liquid 15 milliLiter(s) Oral Mucosa every 12 hours  chlorhexidine 2% Cloths 1 Application(s) Topical <User Schedule>  dexMEDEtomidine Infusion 0.05 MICROgram(s)/kG/Hr (0.75 mL/Hr) IV Continuous <Continuous>  dextrose 40% Gel 15 Gram(s) Oral once  dextrose 5%. 1000 milliLiter(s) (50 mL/Hr) IV Continuous <Continuous>  dextrose 5%. 1000 milliLiter(s) (100 mL/Hr) IV Continuous <Continuous>  dextrose 50% Injectable 25 Gram(s) IV Push once  dextrose 50% Injectable 12.5 Gram(s) IV Push once  dextrose 50% Injectable 25 Gram(s) IV Push once  dextrose 50% Injectable 25 Gram(s) IV Push once  folic acid Injectable 1 milliGRAM(s) IV Push daily  glucagon  Injectable 1 milliGRAM(s) IntraMuscular once  heparin   Injectable 5000 Unit(s) SubCutaneous every 8 hours  hydrocortisone sodium succinate Injectable 50 milliGRAM(s) IV Push every 8 hours  insulin lispro (ADMELOG) corrective regimen sliding scale   SubCutaneous every 6 hours  metoprolol tartrate Injectable 2.5 milliGRAM(s) IV Push every 6 hours  norepinephrine Infusion 0.05 MICROgram(s)/kG/Min (5.63 mL/Hr) IV Continuous <Continuous>  pantoprazole  Injectable 40 milliGRAM(s) IV Push daily  piperacillin/tazobactam IVPB.. 2.25 Gram(s) IV Intermittent every 6 hours  polyethylene glycol 3350 17 Gram(s) Oral daily    MEDICATIONS  (PRN):      PHYSICAL EXAM:  GENERAL: Sedated on a ventilator  HEAD:  Atraumatic, Normocephalic  EYES: EOMI, PERRLA, conjunctiva and sclera clear  NECK: Supple, No JVD, Normal thyroid, no enlarged nodes  NERVOUS SYSTEM:  Alert & Awake.   CHEST/LUNG: B/L good air entry; No rales, rhonchi, or wheezing  HEART: S1S2 normal, no S3, Regular rate and rhythm; No murmurs  ABDOMEN: Soft, Nontender, Nondistended; Bowel sounds present  EXTREMITIES:  2+ Peripheral Pulses, No clubbing, cyanosis, or edema  LYMPH: No lymphadenopathy noted  SKIN: No rashes or lesions    Care Discussed with Consultants/Other Providers [ x] YES  [ ] NO Patient is a 85y old  Male who presents with a chief complaint of AMS, hypothermic, (07 Jan 2021 16:25)    INTERVAL HPI/OVERNIGHT EVENTS:   No overnight events   Afebrile, hemodynamically stable     Patient seen and examined at bedside, on minimal sedation. Mentating well and following commands.     ICU Vital Signs Last 24 Hrs  T(C): 36.9 (07 Jan 2021 13:52), Max: 37.3 (06 Jan 2021 18:29)  T(F): 98.5 (07 Jan 2021 13:52), Max: 99.2 (06 Jan 2021 18:29)  HR: 84 (07 Jan 2021 16:00) (62 - 98)  BP: 119/54 (07 Jan 2021 16:00) (93/50 - 157/78)  BP(mean): 78 (07 Jan 2021 16:00) (66 - 109)  ABP: 136/55 (07 Jan 2021 16:00) (94/47 - 171/83)  ABP(mean): 79 (07 Jan 2021 16:00) (63 - 114)  RR: 19 (07 Jan 2021 16:00) (15 - 26)  SpO2: 100% (07 Jan 2021 16:00) (96% - 100%)    I&O's Summary    06 Jan 2021 07:01  -  07 Jan 2021 07:00  --------------------------------------------------------  IN: 1296.8 mL / OUT: 2072 mL / NET: -775.2 mL    07 Jan 2021 07:01  -  07 Jan 2021 16:29  --------------------------------------------------------  IN: 102 mL / OUT: 110 mL / NET: -8 mL      Mode: standby    LABS:                        7.1    16.23 )-----------( 45       ( 07 Jan 2021 05:37 )             21.5     01-07    135  |  102  |  36<H>  ----------------------------<  229<H>  3.9   |  20<L>  |  1.51<H>    Ca    7.7<L>      07 Jan 2021 05:36  Phos  3.4     01-06  Mg     2.3     01-06    TPro  5.3<L>  /  Alb  2.6<L>  /  TBili  7.1<H>  /  DBili  x   /  AST  184<H>  /  ALT  652<H>  /  AlkPhos  162<H>  01-06    PT/INR - ( 07 Jan 2021 05:36 )   PT: 12.8 sec;   INR: 1.07          PTT - ( 07 Jan 2021 05:36 )  PTT:33.5 sec    CAPILLARY BLOOD GLUCOSE      POCT Blood Glucose.: 116 mg/dL (07 Jan 2021 11:39)  POCT Blood Glucose.: 203 mg/dL (07 Jan 2021 06:31)  POCT Blood Glucose.: 201 mg/dL (06 Jan 2021 23:06)  POCT Blood Glucose.: 195 mg/dL (06 Jan 2021 17:37)    RADIOLOGY & ADDITIONAL TESTS:    Consultant(s) Notes Reviewed:  [x ] YES  [ ] NO    MEDICATIONS  (STANDING):  albuterol/ipratropium for Nebulization 3 milliLiter(s) Nebulizer every 6 hours  chlorhexidine 0.12% Liquid 15 milliLiter(s) Oral Mucosa every 12 hours  chlorhexidine 2% Cloths 1 Application(s) Topical <User Schedule>  dexMEDEtomidine Infusion 0.05 MICROgram(s)/kG/Hr (0.75 mL/Hr) IV Continuous <Continuous>  dextrose 40% Gel 15 Gram(s) Oral once  dextrose 5%. 1000 milliLiter(s) (50 mL/Hr) IV Continuous <Continuous>  dextrose 5%. 1000 milliLiter(s) (100 mL/Hr) IV Continuous <Continuous>  dextrose 50% Injectable 25 Gram(s) IV Push once  dextrose 50% Injectable 12.5 Gram(s) IV Push once  dextrose 50% Injectable 25 Gram(s) IV Push once  dextrose 50% Injectable 25 Gram(s) IV Push once  folic acid Injectable 1 milliGRAM(s) IV Push daily  glucagon  Injectable 1 milliGRAM(s) IntraMuscular once  heparin   Injectable 5000 Unit(s) SubCutaneous every 8 hours  hydrocortisone sodium succinate Injectable 50 milliGRAM(s) IV Push every 8 hours  insulin lispro (ADMELOG) corrective regimen sliding scale   SubCutaneous every 6 hours  metoprolol tartrate Injectable 2.5 milliGRAM(s) IV Push every 6 hours  norepinephrine Infusion 0.05 MICROgram(s)/kG/Min (5.63 mL/Hr) IV Continuous <Continuous>  pantoprazole  Injectable 40 milliGRAM(s) IV Push daily  piperacillin/tazobactam IVPB.. 2.25 Gram(s) IV Intermittent every 6 hours  polyethylene glycol 3350 17 Gram(s) Oral daily    MEDICATIONS  (PRN):    PHYSICAL EXAM  General: Intubated and minimally sedated, no acute distress   HEENT: NC/AT; PERRL, anicteric sclera; MMM  Neck: Supple, no JVP  Cardiovascular: +S1/S2; RRR  Respiratory: CTA b/l  Gastrointestinal: Soft, NT/ND; +BSx4  Extremities: LE with trace dependent edema  Vascular: 2+ radial, DP/PT pulses B/L  Neurological: Able to follow commands, hand  strength 3/5    Care Discussed with Consultants/Other Providers [ x] YES  [ ] NO

## 2021-01-07 NOTE — PROGRESS NOTE ADULT - ASSESSMENT
85M no reported PMHx who presented initially to [facility] ED after EMS found patient down on the street hypothermic to 77F and with altered mental status. Found to have ANTONELLA and hyperK. Nephrology consulted for ANTONELLA and hyperkalemia.      Assessment/Plan:   #hyperkalemia  #oligo-anuric ANTONELLA on CKD vs ANTONELLA   #transaminitis, shock liver   #coagulopathy  #thrombocytopenia   multi-organ failure, unclear precipitant   unclear baseline creatinine   etiology of ANTONELLA likely intra-renal ischemic ATN     Recommend:   stop CVVHD   will commence intermittent hemodialysis PRN   daily CXR and BMP   renal sono consistent with ANTONELLA   strict I/Os   renal diet     Patient was seen and evaluated on dialysis.     Hemodynamically stable.     CVVHD:   QB: 150 mL/min   Dialysis Fluid Rate: 1.2L/h   Prescribed UF set to achieve net even   Actual UF: 0mL/hr     Patient is tolerating the procedure well.     Thank you for the opportunity to participate in the care of your patient. The nephrology service remains available to assist with any questions or concerns. Please feel free to reach us by paging the on-call nephrology fellow for urgent issues or as below.     Ronnell Stout M.D.   PGY-4, Nephrology Fellow   C: [phone]   P: [phone]

## 2021-01-08 LAB
ALBUMIN SERPL ELPH-MCNC: 2.5 G/DL — LOW (ref 3.3–5)
ALP SERPL-CCNC: 170 U/L — HIGH (ref 40–120)
ALT FLD-CCNC: 375 U/L — HIGH (ref 10–45)
ANION GAP SERPL CALC-SCNC: 17 MMOL/L — SIGNIFICANT CHANGE UP (ref 5–17)
AST SERPL-CCNC: 71 U/L — HIGH (ref 10–40)
BASOPHILS # BLD AUTO: 0.05 K/UL — SIGNIFICANT CHANGE UP (ref 0–0.2)
BASOPHILS NFR BLD AUTO: 0.3 % — SIGNIFICANT CHANGE UP (ref 0–2)
BILIRUB SERPL-MCNC: 4.6 MG/DL — HIGH (ref 0.2–1.2)
BLD GP AB SCN SERPL QL: NEGATIVE — SIGNIFICANT CHANGE UP
BUN SERPL-MCNC: 54 MG/DL — HIGH (ref 7–23)
CALCIUM SERPL-MCNC: 7.8 MG/DL — LOW (ref 8.4–10.5)
CHLORIDE SERPL-SCNC: 102 MMOL/L — SIGNIFICANT CHANGE UP (ref 96–108)
CO2 SERPL-SCNC: 21 MMOL/L — LOW (ref 22–31)
CREAT SERPL-MCNC: 2.58 MG/DL — HIGH (ref 0.5–1.3)
EOSINOPHIL # BLD AUTO: 0 K/UL — SIGNIFICANT CHANGE UP (ref 0–0.5)
EOSINOPHIL NFR BLD AUTO: 0 % — SIGNIFICANT CHANGE UP (ref 0–6)
GLUCOSE BLDC GLUCOMTR-MCNC: 126 MG/DL — HIGH (ref 70–99)
GLUCOSE BLDC GLUCOMTR-MCNC: 145 MG/DL — HIGH (ref 70–99)
GLUCOSE BLDC GLUCOMTR-MCNC: 152 MG/DL — HIGH (ref 70–99)
GLUCOSE BLDC GLUCOMTR-MCNC: 164 MG/DL — HIGH (ref 70–99)
GLUCOSE BLDC GLUCOMTR-MCNC: 210 MG/DL — HIGH (ref 70–99)
GLUCOSE SERPL-MCNC: 134 MG/DL — HIGH (ref 70–99)
HCT VFR BLD CALC: 21 % — CRITICAL LOW (ref 39–50)
HGB BLD-MCNC: 7.1 G/DL — LOW (ref 13–17)
IMM GRANULOCYTES NFR BLD AUTO: 8.5 % — HIGH (ref 0–1.5)
LYMPHOCYTES # BLD AUTO: 0.94 K/UL — LOW (ref 1–3.3)
LYMPHOCYTES # BLD AUTO: 5.2 % — LOW (ref 13–44)
MAGNESIUM SERPL-MCNC: 2.4 MG/DL — SIGNIFICANT CHANGE UP (ref 1.6–2.6)
MCHC RBC-ENTMCNC: 29.5 PG — SIGNIFICANT CHANGE UP (ref 27–34)
MCHC RBC-ENTMCNC: 33.8 GM/DL — SIGNIFICANT CHANGE UP (ref 32–36)
MCV RBC AUTO: 87.1 FL — SIGNIFICANT CHANGE UP (ref 80–100)
MONOCYTES # BLD AUTO: 2.55 K/UL — HIGH (ref 0–0.9)
MONOCYTES NFR BLD AUTO: 14 % — SIGNIFICANT CHANGE UP (ref 2–14)
NEUTROPHILS # BLD AUTO: 13.08 K/UL — HIGH (ref 1.8–7.4)
NEUTROPHILS NFR BLD AUTO: 72 % — SIGNIFICANT CHANGE UP (ref 43–77)
NRBC # BLD: 0 /100 WBCS — SIGNIFICANT CHANGE UP (ref 0–0)
PHOSPHATE SERPL-MCNC: 5.4 MG/DL — HIGH (ref 2.5–4.5)
PLATELET # BLD AUTO: 49 K/UL — LOW (ref 150–400)
POTASSIUM SERPL-MCNC: 3.8 MMOL/L — SIGNIFICANT CHANGE UP (ref 3.5–5.3)
POTASSIUM SERPL-SCNC: 3.8 MMOL/L — SIGNIFICANT CHANGE UP (ref 3.5–5.3)
PROCALCITONIN SERPL-MCNC: 1.76 NG/ML — HIGH (ref 0.02–0.1)
PROT SERPL-MCNC: 5.5 G/DL — LOW (ref 6–8.3)
RBC # BLD: 2.41 M/UL — LOW (ref 4.2–5.8)
RBC # FLD: 15.3 % — HIGH (ref 10.3–14.5)
RETICS #: 87.3 K/UL — SIGNIFICANT CHANGE UP (ref 25–125)
RETICS/RBC NFR: 3.7 % — HIGH (ref 0.5–2.5)
RH IG SCN BLD-IMP: POSITIVE — SIGNIFICANT CHANGE UP
SODIUM SERPL-SCNC: 140 MMOL/L — SIGNIFICANT CHANGE UP (ref 135–145)
WBC # BLD: 18.17 K/UL — HIGH (ref 3.8–10.5)
WBC # FLD AUTO: 18.17 K/UL — HIGH (ref 3.8–10.5)

## 2021-01-08 PROCEDURE — 99233 SBSQ HOSP IP/OBS HIGH 50: CPT

## 2021-01-08 PROCEDURE — 99232 SBSQ HOSP IP/OBS MODERATE 35: CPT | Mod: GC

## 2021-01-08 RX ORDER — POTASSIUM CHLORIDE 20 MEQ
40 PACKET (EA) ORAL ONCE
Refills: 0 | Status: DISCONTINUED | OUTPATIENT
Start: 2021-01-08 | End: 2021-01-08

## 2021-01-08 RX ORDER — SODIUM CHLORIDE 9 MG/ML
3 INJECTION INTRAMUSCULAR; INTRAVENOUS; SUBCUTANEOUS EVERY 4 HOURS
Refills: 0 | Status: DISCONTINUED | OUTPATIENT
Start: 2021-01-08 | End: 2021-01-13

## 2021-01-08 RX ADMIN — Medication 2: at 06:33

## 2021-01-08 RX ADMIN — Medication 3 MILLILITER(S): at 21:29

## 2021-01-08 RX ADMIN — Medication 50 MILLIGRAM(S): at 07:02

## 2021-01-08 RX ADMIN — HEPARIN SODIUM 5000 UNIT(S): 5000 INJECTION INTRAVENOUS; SUBCUTANEOUS at 23:56

## 2021-01-08 RX ADMIN — SODIUM CHLORIDE 3 MILLILITER(S): 9 INJECTION INTRAMUSCULAR; INTRAVENOUS; SUBCUTANEOUS at 11:45

## 2021-01-08 RX ADMIN — Medication 2.5 MILLIGRAM(S): at 06:22

## 2021-01-08 RX ADMIN — SODIUM CHLORIDE 3 MILLILITER(S): 9 INJECTION INTRAMUSCULAR; INTRAVENOUS; SUBCUTANEOUS at 14:47

## 2021-01-08 RX ADMIN — Medication 3 MILLILITER(S): at 14:47

## 2021-01-08 RX ADMIN — Medication 4: at 17:43

## 2021-01-08 RX ADMIN — Medication 2.5 MILLIGRAM(S): at 23:56

## 2021-01-08 RX ADMIN — SODIUM CHLORIDE 3 MILLILITER(S): 9 INJECTION INTRAMUSCULAR; INTRAVENOUS; SUBCUTANEOUS at 19:00

## 2021-01-08 RX ADMIN — CHLORHEXIDINE GLUCONATE 1 APPLICATION(S): 213 SOLUTION TOPICAL at 06:23

## 2021-01-08 RX ADMIN — Medication 3 MILLILITER(S): at 09:45

## 2021-01-08 RX ADMIN — PIPERACILLIN AND TAZOBACTAM 200 GRAM(S): 4; .5 INJECTION, POWDER, LYOPHILIZED, FOR SOLUTION INTRAVENOUS at 07:02

## 2021-01-08 RX ADMIN — Medication 3 MILLILITER(S): at 05:37

## 2021-01-08 RX ADMIN — Medication 50 MILLIGRAM(S): at 23:56

## 2021-01-08 RX ADMIN — HEPARIN SODIUM 5000 UNIT(S): 5000 INJECTION INTRAVENOUS; SUBCUTANEOUS at 14:46

## 2021-01-08 RX ADMIN — Medication 2.5 MILLIGRAM(S): at 11:38

## 2021-01-08 RX ADMIN — HEPARIN SODIUM 5000 UNIT(S): 5000 INJECTION INTRAVENOUS; SUBCUTANEOUS at 06:21

## 2021-01-08 RX ADMIN — Medication 2.5 MILLIGRAM(S): at 17:43

## 2021-01-08 RX ADMIN — SODIUM CHLORIDE 3 MILLILITER(S): 9 INJECTION INTRAMUSCULAR; INTRAVENOUS; SUBCUTANEOUS at 23:57

## 2021-01-08 NOTE — PROGRESS NOTE ADULT - ASSESSMENT
85M no reported PMHx who presented initially to [facility] ED after EMS found patient down on the street hypothermic to 77F and with altered mental status. Found to have ANTONELLA and hyperK. Nephrology consulted for ANTONELLA and hyperkalemia.      Assessment/Plan:   #hyperkalemia  #oligo-anuric ANTONELLA on CKD vs ANTONELLA   #transaminitis, shock liver   #coagulopathy  #thrombocytopenia   #anemia   multi-organ failure, unclear precipitant, improving   unclear baseline creatinine   etiology of ANTONELLA likely intra-renal ischemic ATN     Recommend:   s/p CVVHD, stopped 1/7   will commence intermittent hemodialysis 1/9    daily CXR and BMP   renal sono consistent with ANTONELLA   strict I/Os   renal diet     Thank you for the opportunity to participate in the care of your patient. The nephrology service remains available to assist with any questions or concerns. Please feel free to reach us by paging the on-call nephrology fellow for urgent issues or as below.     Ronnell Stout M.D.   PGY-4, Nephrology Fellow   C: [phone]   P: [phone]

## 2021-01-08 NOTE — SWALLOW BEDSIDE ASSESSMENT ADULT - MUCOSAL QUALITY
Oral mucosa was judged to be clean and moist, but pooled secretions noted. Oral care w oral suctioning provided, with which pt was cooperative. Voice was judged ot be dysphonic and intermittently wet/gurgly at baseline.

## 2021-01-08 NOTE — SWALLOW BEDSIDE ASSESSMENT ADULT - NS SPL SWALLOW CLINIC TRIAL FT
When pt was prompted to remain awake, pt stated that he wanted to sleep because he hasn't slept in months.

## 2021-01-08 NOTE — PROGRESS NOTE ADULT - SUBJECTIVE AND OBJECTIVE BOX
**INCOMPLETE NOTE**    Patient is a 85y old  Male who presents with a chief complaint of AMS, hypothermic, (07 Jan 2021 16:25)    INTERVAL HPI/OVERNIGHT EVENTS:   No overnight events   Afebrile, hemodynamically stable     ICU Vital Signs Last 24 Hrs  T(C): 37.8 (08 Jan 2021 06:22), Max: 37.8 (08 Jan 2021 01:28)  T(F): 100.1 (08 Jan 2021 06:22), Max: 100.1 (08 Jan 2021 06:22)  HR: 96 (08 Jan 2021 06:00) (64 - 103)  BP: 118/58 (08 Jan 2021 06:00) (98/54 - 132/69)  BP(mean): 84 (08 Jan 2021 06:00) (73 - 93)  ABP: 126/48 (07 Jan 2021 18:00) (110/48 - 143/62)  ABP(mean): 71 (07 Jan 2021 18:00) (67 - 92)  RR: 21 (08 Jan 2021 06:00) (15 - 26)  SpO2: 96% (08 Jan 2021 06:00) (96% - 100%)    I&O's Summary    06 Jan 2021 07:01  -  07 Jan 2021 07:00  --------------------------------------------------------  IN: 1296.8 mL / OUT: 2072 mL / NET: -775.2 mL    07 Jan 2021 07:01  -  08 Jan 2021 06:25  --------------------------------------------------------  IN: 202 mL / OUT: 591 mL / NET: -389 mL      Mode: standby      LABS:                        7.1    18.17 )-----------( 49       ( 08 Jan 2021 04:54 )             21.0     01-08    140  |  102  |  54<H>  ----------------------------<  134<H>  3.8   |  21<L>  |  2.58<H>    Ca    7.8<L>      08 Jan 2021 04:54  Phos  5.4     01-08  Mg     2.4     01-08    TPro  5.5<L>  /  Alb  2.5<L>  /  TBili  4.6<H>  /  DBili  x   /  AST  71<H>  /  ALT  375<H>  /  AlkPhos  170<H>  01-08    PT/INR - ( 07 Jan 2021 05:36 )   PT: 12.8 sec;   INR: 1.07          PTT - ( 07 Jan 2021 05:36 )  PTT:33.5 sec    CAPILLARY BLOOD GLUCOSE      POCT Blood Glucose.: 126 mg/dL (07 Jan 2021 23:19)  POCT Blood Glucose.: 96 mg/dL (07 Jan 2021 17:59)  POCT Blood Glucose.: 116 mg/dL (07 Jan 2021 11:39)  POCT Blood Glucose.: 203 mg/dL (07 Jan 2021 06:31)        RADIOLOGY & ADDITIONAL TESTS:    Consultant(s) Notes Reviewed:  [x ] YES  [ ] NO    MEDICATIONS  (STANDING):  albuterol/ipratropium for Nebulization 3 milliLiter(s) Nebulizer every 6 hours  chlorhexidine 2% Cloths 1 Application(s) Topical <User Schedule>  dexMEDEtomidine Infusion 0.05 MICROgram(s)/kG/Hr (0.75 mL/Hr) IV Continuous <Continuous>  dextrose 40% Gel 15 Gram(s) Oral once  dextrose 5%. 1000 milliLiter(s) (50 mL/Hr) IV Continuous <Continuous>  dextrose 5%. 1000 milliLiter(s) (100 mL/Hr) IV Continuous <Continuous>  dextrose 50% Injectable 25 Gram(s) IV Push once  dextrose 50% Injectable 12.5 Gram(s) IV Push once  dextrose 50% Injectable 25 Gram(s) IV Push once  dextrose 50% Injectable 25 Gram(s) IV Push once  folic acid Injectable 1 milliGRAM(s) IV Push daily  glucagon  Injectable 1 milliGRAM(s) IntraMuscular once  heparin   Injectable 5000 Unit(s) SubCutaneous every 8 hours  hydrocortisone sodium succinate Injectable 50 milliGRAM(s) IV Push every 12 hours  insulin lispro (ADMELOG) corrective regimen sliding scale   SubCutaneous every 6 hours  metoprolol tartrate Injectable 2.5 milliGRAM(s) IV Push every 6 hours  norepinephrine Infusion 0.05 MICROgram(s)/kG/Min (5.63 mL/Hr) IV Continuous <Continuous>  pantoprazole  Injectable 40 milliGRAM(s) IV Push daily  piperacillin/tazobactam IVPB.. 2.25 Gram(s) IV Intermittent every 6 hours  polyethylene glycol 3350 17 Gram(s) Oral daily    MEDICATIONS  (PRN):    PHYSICAL EXAM:  GENERAL:   HEAD:  Atraumatic, Normocephalic  EYES: EOMI, PERRLA, conjunctiva and sclera clear  NECK: Supple, No JVD, Normal thyroid, no enlarged nodes  NERVOUS SYSTEM:  Alert & Awake.   CHEST/LUNG: B/L good air entry; No rales, rhonchi, or wheezing  HEART: S1S2 normal, no S3, Regular rate and rhythm; No murmurs  ABDOMEN: Soft, Nontender, Nondistended; Bowel sounds present  EXTREMITIES:  2+ Peripheral Pulses, No clubbing, cyanosis, or edema  LYMPH: No lymphadenopathy noted  SKIN: No rashes or lesions    Care Discussed with Consultants/Other Providers [ x] YES  [ ] NO Patient is a 85y old  Male who presents with a chief complaint of AMS, hypothermic, (07 Jan 2021 16:25)    INTERVAL HPI/OVERNIGHT EVENTS:   No overnight events   Afebrile, hemodynamically stable     Patient seen and examined at bedside. Pleasantly demented. Denies any complaints.    ICU Vital Signs Last 24 Hrs  T(C): 37.8 (08 Jan 2021 06:22), Max: 37.8 (08 Jan 2021 01:28)  T(F): 100.1 (08 Jan 2021 06:22), Max: 100.1 (08 Jan 2021 06:22)  HR: 96 (08 Jan 2021 06:00) (64 - 103)  BP: 118/58 (08 Jan 2021 06:00) (98/54 - 132/69)  BP(mean): 84 (08 Jan 2021 06:00) (73 - 93)  ABP: 126/48 (07 Jan 2021 18:00) (110/48 - 143/62)  ABP(mean): 71 (07 Jan 2021 18:00) (67 - 92)  RR: 21 (08 Jan 2021 06:00) (15 - 26)  SpO2: 96% (08 Jan 2021 06:00) (96% - 100%)    I&O's Summary    06 Jan 2021 07:01  -  07 Jan 2021 07:00  --------------------------------------------------------  IN: 1296.8 mL / OUT: 2072 mL / NET: -775.2 mL    07 Jan 2021 07:01  -  08 Jan 2021 06:25  --------------------------------------------------------  IN: 202 mL / OUT: 591 mL / NET: -389 mL    Mode: standby    LABS:                        7.1    18.17 )-----------( 49       ( 08 Jan 2021 04:54 )             21.0     01-08    140  |  102  |  54<H>  ----------------------------<  134<H>  3.8   |  21<L>  |  2.58<H>    Ca    7.8<L>      08 Jan 2021 04:54  Phos  5.4     01-08  Mg     2.4     01-08    TPro  5.5<L>  /  Alb  2.5<L>  /  TBili  4.6<H>  /  DBili  x   /  AST  71<H>  /  ALT  375<H>  /  AlkPhos  170<H>  01-08    PT/INR - ( 07 Jan 2021 05:36 )   PT: 12.8 sec;   INR: 1.07          PTT - ( 07 Jan 2021 05:36 )  PTT:33.5 sec    CAPILLARY BLOOD GLUCOSE      POCT Blood Glucose.: 126 mg/dL (07 Jan 2021 23:19)  POCT Blood Glucose.: 96 mg/dL (07 Jan 2021 17:59)  POCT Blood Glucose.: 116 mg/dL (07 Jan 2021 11:39)  POCT Blood Glucose.: 203 mg/dL (07 Jan 2021 06:31)      RADIOLOGY & ADDITIONAL TESTS:    Consultant(s) Notes Reviewed:  [x ] YES  [ ] NO    MEDICATIONS  (STANDING):  albuterol/ipratropium for Nebulization 3 milliLiter(s) Nebulizer every 6 hours  chlorhexidine 2% Cloths 1 Application(s) Topical <User Schedule>  dexMEDEtomidine Infusion 0.05 MICROgram(s)/kG/Hr (0.75 mL/Hr) IV Continuous <Continuous>  dextrose 40% Gel 15 Gram(s) Oral once  dextrose 5%. 1000 milliLiter(s) (50 mL/Hr) IV Continuous <Continuous>  dextrose 5%. 1000 milliLiter(s) (100 mL/Hr) IV Continuous <Continuous>  dextrose 50% Injectable 25 Gram(s) IV Push once  dextrose 50% Injectable 12.5 Gram(s) IV Push once  dextrose 50% Injectable 25 Gram(s) IV Push once  dextrose 50% Injectable 25 Gram(s) IV Push once  folic acid Injectable 1 milliGRAM(s) IV Push daily  glucagon  Injectable 1 milliGRAM(s) IntraMuscular once  heparin   Injectable 5000 Unit(s) SubCutaneous every 8 hours  hydrocortisone sodium succinate Injectable 50 milliGRAM(s) IV Push every 12 hours  insulin lispro (ADMELOG) corrective regimen sliding scale   SubCutaneous every 6 hours  metoprolol tartrate Injectable 2.5 milliGRAM(s) IV Push every 6 hours  norepinephrine Infusion 0.05 MICROgram(s)/kG/Min (5.63 mL/Hr) IV Continuous <Continuous>  pantoprazole  Injectable 40 milliGRAM(s) IV Push daily  piperacillin/tazobactam IVPB.. 2.25 Gram(s) IV Intermittent every 6 hours  polyethylene glycol 3350 17 Gram(s) Oral daily    MEDICATIONS  (PRN):  PHYSICAL EXAM  General: Elderly, no acute distress  HEENT: NC/AT; PERRL, anicteric sclera; MMM  Neck: Supple, no JVP  Cardiovascular: +S1/S2; RRR  Respiratory: Course breath sounds bilaterally   Gastrointestinal: Soft, NT/ND; +BSx4  Extremities: LE with trace dependent edema  Vascular: 2+ radial, DP/PT pulses B/L  Neurological: Alert, oriented to self. Pleasantly demented. Generalized weakness on exam in setting of deconditioned state     Care Discussed with Consultants/Other Providers [ x] YES  [ ] NO

## 2021-01-08 NOTE — SWALLOW BEDSIDE ASSESSMENT ADULT - ORAL PHASE
Pt initially began to orally manipulate the ice-chip, and then became increasingly lethargic, requiring constant stimulation to remain awake. Eventually, pt stopped orally manipulating the bolus, and appeared to have fallen asleep. Oral suctioning was provided.

## 2021-01-08 NOTE — CHART NOTE - NSCHARTNOTEFT_GEN_A_CORE
Admitting Diagnosis:   Patient is a 85y old  Male who presents with a chief complaint of AMS, hypothermic, (08 Jan 2021 11:07)      PAST MEDICAL & SURGICAL HISTORY:  Medical history unknown    No significant past surgical history        Current Nutrition Order:  Remains ordered for EN but currently not running as NGT was removed     PO Intake: Good (%) [   ]  Fair (50-75%) [   ] Poor (<25%) [   ]- NA NPO    GI Issues: Pt denied N/V/V/D; last BM 1/8    Pain: He denied pain this morning     Skin Integrity: Mason 14; generalized mild edema  B/L arms 2/3+ edema     Labs:   01-08    140  |  102  |  54<H>  ----------------------------<  134<H>  3.8   |  21<L>  |  2.58<H>    Ca    7.8<L>      08 Jan 2021 04:54  Phos  5.4     01-08  Mg     2.4     01-08    TPro  5.5<L>  /  Alb  2.5<L>  /  TBili  4.6<H>  /  DBili  x   /  AST  71<H>  /  ALT  375<H>  /  AlkPhos  170<H>  01-08    CAPILLARY BLOOD GLUCOSE      POCT Blood Glucose.: 164 mg/dL (08 Jan 2021 11:29)  POCT Blood Glucose.: 152 mg/dL (08 Jan 2021 06:30)  POCT Blood Glucose.: 126 mg/dL (07 Jan 2021 23:19)  POCT Blood Glucose.: 96 mg/dL (07 Jan 2021 17:59)      Medications:  MEDICATIONS  (STANDING):  albuterol/ipratropium for Nebulization 3 milliLiter(s) Nebulizer every 6 hours  chlorhexidine 2% Cloths 1 Application(s) Topical <User Schedule>  dextrose 40% Gel 15 Gram(s) Oral once  dextrose 5%. 1000 milliLiter(s) (50 mL/Hr) IV Continuous <Continuous>  dextrose 5%. 1000 milliLiter(s) (100 mL/Hr) IV Continuous <Continuous>  dextrose 50% Injectable 25 Gram(s) IV Push once  dextrose 50% Injectable 12.5 Gram(s) IV Push once  dextrose 50% Injectable 25 Gram(s) IV Push once  dextrose 50% Injectable 25 Gram(s) IV Push once  glucagon  Injectable 1 milliGRAM(s) IntraMuscular once  heparin   Injectable 5000 Unit(s) SubCutaneous every 8 hours  hydrocortisone sodium succinate Injectable 50 milliGRAM(s) IV Push every 12 hours  insulin lispro (ADMELOG) corrective regimen sliding scale   SubCutaneous every 6 hours  metoprolol tartrate Injectable 2.5 milliGRAM(s) IV Push every 6 hours  polyethylene glycol 3350 17 Gram(s) Oral daily  sodium chloride 3%  Inhalation 3 milliLiter(s) Inhalation every 4 hours    MEDICATIONS  (PRN):      Height: 5'7" Weight: 60kg IBW:67.1kg+/-10%, %IBW: 89%, BMI:20.6kg/m2    Weight Change: 74.7kg (1/8, bedscale)- unsure accuracy of this wt vs. admit weight of 60kg  Will continue to trend    Estimated energy needs:   ABW(60kg) used to calculate energy needs due to pt's current body weight <100% IBW (89%). Nutrient needs based on [facility] standards of care for maintenance in adults, adjusted for age, renal failure. Fluids per team.  Calories: 1677 - 2013 kcal (25-30kcal/kg)  Protein: 72-84g protein (1.2-1.4 g/kg)    Subjective:   84y/o male with unknown history(suspected ETOH abuse) presented to ED after being found on street with hypothermia 77F, altered mentation and coffee ground emesis. Pt admitted to MICU for further evaluation and management of hypothermia, shock, and r/o ischemic bowel/ gastrointestinal hemorrhage. Pt intubated for airway protection. Pt is s/p HD catheter placement and initiation of CVVHD on 1/4. He was transitioned to IHD on 1/7. Successfully taken off of sedation and extubated on 1/7. Pt seen in room and discussed during MICU rounds, awakens to name but a little groggy. Since extubation he has persistently had thick secretions/phlegm build up and is not allowing RN to suction. Concern for inability to tolerate secretions- started on steroids and hypertonic saline inhalation. SLP evaluated pt this AM but pt not appropriate for PO trials at this time. May replace NGT. Pt w/o complaints of N/V or pain today. Did complain that he was thirsty. Last BM 1/8. Planned for HD session on 1/9. Temp of 100.1F this morning. WBC 18.17 (H), Phos 5.4 (H), BUN 54/Cr 2.58 (H, trending up). Will continue to follow per RD protocol.       Previous Nutrition Diagnosis:  Increased Nutrient Needs (protein) RT increased demand 2/2 hypermetabolic state AEB vented in ICU  Active [   ]  Resolved [ X  ]    If resolved, new PES:   Inadequate energy intake RT current NPO status AEB 0% of EER able to be met at this time   Active [ X ]  Resolved [   ]    Goal/Expected Outcome Consistently meet >75% est needs via most appropriate route with good tolerance    Recommendations:  1. If unable to take PO w/in 48hrs, recommend replacing NGT and starting Jevity 1.5 Hermann @ 45ml/hr x 24hrs plus 1 ProStat via NGT. Provides: 1080ml TV, 1720kcal, 84g pro, 821ml free H2O, 108% RDI, 1.4g/kg ABW pro. Monitor for s/s intolerance; maintain aspiration precautions at all times   2. F/u with SLP recommendations for diet advancement   3. Monitor BMP, lytes, [id number]; replete lytes prn.   4. Pain and bowel regimen per team.  5. Obtain current weight and trend dry wts     Education: Deferred at this time 2/2 pt's clinical status.    Risk Level: High [X  ] Moderate [   ] Low [   ].

## 2021-01-08 NOTE — SWALLOW BEDSIDE ASSESSMENT ADULT - SWALLOW EVAL: DIAGNOSIS
Pt w suspected pharyngeal dysphagia characterized by decreased secretion management resulting in wet vocal quality at baseline. Pt is not able to sustain alertness level needed for PO intake, and is also not wanting to eat/drink at this time. A PO diet cannot be safely recommended at this time.

## 2021-01-08 NOTE — SWALLOW BEDSIDE ASSESSMENT ADULT - COMMENTS
Per RN, pt is very sleepy and sounds wet/gurgly at baseline. He is able to cough up secretions, but does not allow oral suctioning to remove the secretions, and then swallows the secretions. RN has been attempting to suction him to clear voice/breath sounds, but pt does not allow this. She also attempted the nursing dysphagia screen yesterday, but pt refused most trials. He agreed to applesauce, but was still wet/gurgly. RN stated that she did not feel that pt was able to safely tolerate PO, and requested the team place a consult for our SVC. Pt previously had a NGT, but it was clogged and thus removed.

## 2021-01-08 NOTE — SWALLOW BEDSIDE ASSESSMENT ADULT - ADDITIONAL RECOMMENDATIONS
1) Oral care w suctioning as needed  2) If pt requires imminent nutrition/hydration/medication, consider a short-term alternative means  3) This SVC will f/u to reassess for PO candidacy

## 2021-01-08 NOTE — PROGRESS NOTE ADULT - SUBJECTIVE AND OBJECTIVE BOX
Patient is a 85y Male seen and evaluated at bedside.   remains off pressor and on 2L NC O2   off CVVHD, Cr rising today   ~500cc UOP/24h   no complaints, denies CP SOB fever     Meds:    albuterol/ipratropium for Nebulization 3 every 6 hours  chlorhexidine 2% Cloths 1 <User Schedule>  dextrose 40% Gel 15 once  dextrose 5%. 1000 <Continuous>  dextrose 5%. 1000 <Continuous>  dextrose 50% Injectable 25 once  dextrose 50% Injectable 12.5 once  dextrose 50% Injectable 25 once  dextrose 50% Injectable 25 once  glucagon  Injectable 1 once  heparin   Injectable 5000 every 8 hours  hydrocortisone sodium succinate Injectable 50 every 12 hours  insulin lispro (ADMELOG) corrective regimen sliding scale  every 6 hours  metoprolol tartrate Injectable 2.5 every 6 hours  polyethylene glycol 3350 17 daily  sodium chloride 3%  Inhalation 3 every 4 hours      T(C): , Max: 37.8 (01-08-21 @ 01:28)  T(F): , Max: 100.1 (01-08-21 @ 06:22)  HR: 93 (01-08-21 @ 09:00)  BP: 118/60 (01-08-21 @ 09:00)  BP(mean): 83 (01-08-21 @ 09:00)  RR: 22 (01-08-21 @ 09:00)  SpO2: 97% (01-08-21 @ 09:00)  Wt(kg): --    01-07 @ 07:01  -  01-08 @ 07:00  --------------------------------------------------------  IN: 202 mL / OUT: 591 mL / NET: -389 mL    01-08 @ 07:01  -  01-08 @ 11:07  --------------------------------------------------------  IN: 0 mL / OUT: 250 mL / NET: -250 mL            Review of Systems:  denies CP fever SOB     PHYSICAL EXAM:  GENERAL: on NC O2, NAD, alert   CHEST/LUNG: Bilateral clear breath sounds  HEART: Regular rate and rhythm, no murmur, no gallops, no rub   ABDOMEN: Soft, nontender, non distended  EXTREMITIES: no pedal edema  ACCESS: R fem temp hemodialysis catheter       LABS:                        7.1    18.17 )-----------( 49       ( 08 Jan 2021 04:54 )             21.0     01-08    140  |  102  |  54<H>  ----------------------------<  134<H>  3.8   |  21<L>  |  2.58<H>    Ca    7.8<L>      08 Jan 2021 04:54  Phos  5.4     01-08  Mg     2.4     01-08    TPro  5.5<L>  /  Alb  2.5<L>  /  TBili  4.6<H>  /  DBili  x   /  AST  71<H>  /  ALT  375<H>  /  AlkPhos  170<H>  01-08      PT/INR - ( 07 Jan 2021 05:36 )   PT: 12.8 sec;   INR: 1.07          PTT - ( 07 Jan 2021 05:36 )  PTT:33.5 sec          RADIOLOGY & ADDITIONAL STUDIES:

## 2021-01-08 NOTE — PHYSICAL THERAPY INITIAL EVALUATION ADULT - PERTINENT HX OF CURRENT PROBLEM, REHAB EVAL
presented initially to [facility] ED after EMS found patient down on the street hypothermic to 77F and with altered mental status. Unfortunately pt is a poor historian was not able to verbalize any symptoms or sequence of events due to his critical condition. Based off the ED provier note, pt arrived to the ED alert , interactive but mumbling.

## 2021-01-08 NOTE — SWALLOW BEDSIDE ASSESSMENT ADULT - SLP GENERAL OBSERVATIONS
Pt was received sleeping in bed. He woke given verbal/tactile prompting but required frequent prompts to remain awake for this evaluation. PCA assisted w positioning the pt upright and at midline in bed. Upon opening his mouth to speak, cloudy secretions were noted to spill from L side. He was oriented to self, but did not answer other orientation questions.

## 2021-01-08 NOTE — PROGRESS NOTE ADULT - ASSESSMENT
86 y/o man with no reported PMHx (possibly Afib, states not on any medications) who presented initially to [facility] ED after EMS found patient down on the street hypothermic to 77F and with altered mental status. Pt admitted to MICU for further evaluation and management of hypothermia, shock, and r/o ischemic bowel/ gastrointestinal hemorrhage. Intubated 12/31, now extubated 1/7 without complications. Completed 7 day course of abx for presumed PNA. Now starting Q4H chest physiotherapy s/p extubation.     NEURO  - Weaned off sedation in AM    #Encephalopathy  Patient presented to [facility] with AMS. CTH noncontrast without acute bleed or fracture. Alcohol level elevated with hx of alcohol use. UTox negative  Pt initially with severe hypothermia, metabolic derangements, and shock likely contributing to altered mental status.   NOW RESOLVED   - Weaned off sedation 1/7, mentating well, pleasantly demented     CARDIOVASCULAR  #SHOCK  Pt found to be hypotensive to 80s/40s likely 2/2 to hemorrhagic/hypovolemic shock vs septic shock (unclear source) vs distributive (rewarming). Hemorrhagic source evidenced by Hgb drop from 8.2 to 5.9 with coffee grounds suctioned from sump placed. Pt with alerted mentation, low urine output, and cold to touch. s/p volume resuscitation with blood products as well as intermittent fluid boluses  - Weaned of levo gtt 1/7   - Completed 7 day zosyn course (-1/8)    #Afib with RVR  - Intermittently in Afib with RVR on 1/2   - SOSA w/ Grade I left ventricular diastolic dysfunction, Aortic sclerosis without significant stenosis, Trace aortic regurgitation, Moderate tricuspid regurgitation.  - c/w lopressor 2.5 q6    PULMONARY  #Respiratory Failure  Pt not hypoxic but with tachypnea, increased WOB with use of accessory muscles. Pt intubated due to concern for eventual respiratory failure and air way protection  - EXTUBATED 1/7 without complications  - Currently on 2L NC satting >95%  - Duoneb, hypertonic saline, chest physiotherapy q4h for 2 days (1/8-1/10)   - PT/OT for deconditioned state     #PNA vs atelectasis  - Completed 7 day zosyn course (-1/8)  - MRSA swab negative  - Increased secretions this morning w/ bump in WBC. Will hold off on continuing abx at this time.   - Continue with chest PT as above     GASTROINTESTINAL  - PENDING SPEECH AND SWALLOW EVAL IN AM; will place NGT if he doesn't pass     #r/o Bowel Ischemia  Pt with Hb 5.8 upon MICU arrival, with coffee ground output from sump. In the setting of shock and low Hb[facility]   - CTA A/P performed with no intestinal bleed or mesenteric ischemia  - Surgery consulted. no surgical intervention at this time     #r/o Gastrointestinal Hemorrhage  See above  - s/p IV protonix gtt and octreotide gtt  - d/c octreotide drip due to no evidence of portal HTN or cirrhosis on imagining   - c/w PPI daily    #Acute liver failure  LFTs > 7000, with coagulopathy and Utox negative. Acetaminophen level negative. Alcohol level 26. Hx of alcohol use. Liver failure possibly 2/2 to shock liver   - IMPROVING     RENAL  #Anion Gap Metabolic Acidosis  Likely 2/2 lactic acidosis. AG of 28 on presentation with pH 7.19, pCO2 15, bicarb 6 on arrival to MICU. Initiated on bicarb gtt here and s/p 2 amps bicarb  - RESOLVED    #Acute Renal Failure  Pt with sCr 2.93 on presentation (unknown baseline), improved s/p fluid boluses in ED. 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 In the setting of ANTONELLA  - Resolved     INFECTIOUS DISEASE  #LLL consolidation vs atelectasis  - Completed 7 day zosyn course (-1/8)  - MRSA swab negative    ENDOCRINE  - No active issues     HEME  #Normocytic Anemia   On presentation, patient with Hb 8.2, with repeat of 5.8 on MICU arrival. Now s/p 2u pRBC, with posttransfusion Hb 8.8. s/p 2 U PRBC 12/31  - Hgb stable  - Trend CBC  - Maintain active T&S    #Thrombocytopenia   Plt 70 on presentation-->20s Likely 2/2 DIC in setting of septic shock versus ITP. Per Heme/Onc, HLH was initially a consideration given markedly elevated ferritin (30k) Send CD25/IL-2, NK cell activity.  However it is unlikely with improving LDH and transaminases have markedly improved.   - Continue to monitor CBC    #Elevated INR  In the setting of acute liver failure and DIC likely from hypothermia. Repeat INR 2.39  - s/p IV vitamin K 10mg daily x 3 days per GI  - s/p 4 U FFP (01/01), 1 U Cryoprecipitate (01/01)    MSK  #R pubic rim fracture  Noted on CTA abd/pelvis with adjacent hematoma without active signs of bleeding.  - Ortho consulted. Currently no surgical intervention indicated at this time.      - Madden removed     RHEUM  - Per collateral obtained by PCP patient w/ hx of RA on prednisone 10 mg   - Continue hydrocortisone 50mg daily for 2 days 1/8, 1/9, then continue on home Prednisone 10mg    FLUIDS/ELECTROLYTES/NUTRITION  -IVF: None  -Monitor, Replete to K>4 and Mg>2  -Diet: SPEECH AND SWALLOW IN AM  PROPHYLAXIS  -DVT: Heparin 500 q8h  -GI ppx: Protonix daily  DISPO: MICU  CODE STATUS: FULL

## 2021-01-09 DIAGNOSIS — N18.6 END STAGE RENAL DISEASE: ICD-10-CM

## 2021-01-09 DIAGNOSIS — N17.9 ACUTE KIDNEY FAILURE, UNSPECIFIED: ICD-10-CM

## 2021-01-09 LAB
ALBUMIN SERPL ELPH-MCNC: 3 G/DL — LOW (ref 3.3–5)
ALBUMIN SERPL ELPH-MCNC: 3.6 G/DL — SIGNIFICANT CHANGE UP (ref 3.3–5)
ALP SERPL-CCNC: 134 U/L — HIGH (ref 40–120)
ALP SERPL-CCNC: 74 U/L — SIGNIFICANT CHANGE UP (ref 40–120)
ALT FLD-CCNC: 22 U/L — SIGNIFICANT CHANGE UP (ref 10–45)
ALT FLD-CCNC: 308 U/L — HIGH (ref 10–45)
ANION GAP SERPL CALC-SCNC: 13 MMOL/L — SIGNIFICANT CHANGE UP (ref 5–17)
ANION GAP SERPL CALC-SCNC: 14 MMOL/L — SIGNIFICANT CHANGE UP (ref 5–17)
ANION GAP SERPL CALC-SCNC: 19 MMOL/L — HIGH (ref 5–17)
AST SERPL-CCNC: 23 U/L — SIGNIFICANT CHANGE UP (ref 10–40)
AST SERPL-CCNC: 57 U/L — HIGH (ref 10–40)
BASOPHILS # BLD AUTO: 0.03 K/UL — SIGNIFICANT CHANGE UP (ref 0–0.2)
BASOPHILS NFR BLD AUTO: 0.3 % — SIGNIFICANT CHANGE UP (ref 0–2)
BILIRUB SERPL-MCNC: 0.4 MG/DL — SIGNIFICANT CHANGE UP (ref 0.2–1.2)
BILIRUB SERPL-MCNC: 4.5 MG/DL — HIGH (ref 0.2–1.2)
BUN SERPL-MCNC: 15 MG/DL — SIGNIFICANT CHANGE UP (ref 7–23)
BUN SERPL-MCNC: 38 MG/DL — HIGH (ref 7–23)
BUN SERPL-MCNC: 70 MG/DL — HIGH (ref 7–23)
CALCIUM SERPL-MCNC: 7.6 MG/DL — LOW (ref 8.4–10.5)
CALCIUM SERPL-MCNC: 7.8 MG/DL — LOW (ref 8.4–10.5)
CALCIUM SERPL-MCNC: 8.9 MG/DL — SIGNIFICANT CHANGE UP (ref 8.4–10.5)
CHLORIDE SERPL-SCNC: 102 MMOL/L — SIGNIFICANT CHANGE UP (ref 96–108)
CHLORIDE SERPL-SCNC: 105 MMOL/L — SIGNIFICANT CHANGE UP (ref 96–108)
CHLORIDE SERPL-SCNC: 105 MMOL/L — SIGNIFICANT CHANGE UP (ref 96–108)
CO2 SERPL-SCNC: 21 MMOL/L — LOW (ref 22–31)
CO2 SERPL-SCNC: 23 MMOL/L — SIGNIFICANT CHANGE UP (ref 22–31)
CO2 SERPL-SCNC: 25 MMOL/L — SIGNIFICANT CHANGE UP (ref 22–31)
CREAT SERPL-MCNC: 0.66 MG/DL — SIGNIFICANT CHANGE UP (ref 0.5–1.3)
CREAT SERPL-MCNC: 2.04 MG/DL — HIGH (ref 0.5–1.3)
CREAT SERPL-MCNC: 3.49 MG/DL — HIGH (ref 0.5–1.3)
EOSINOPHIL # BLD AUTO: 0.03 K/UL — SIGNIFICANT CHANGE UP (ref 0–0.5)
EOSINOPHIL NFR BLD AUTO: 0.3 % — SIGNIFICANT CHANGE UP (ref 0–6)
GLUCOSE BLDC GLUCOMTR-MCNC: 135 MG/DL — HIGH (ref 70–99)
GLUCOSE BLDC GLUCOMTR-MCNC: 164 MG/DL — HIGH (ref 70–99)
GLUCOSE BLDC GLUCOMTR-MCNC: 185 MG/DL — HIGH (ref 70–99)
GLUCOSE BLDC GLUCOMTR-MCNC: 191 MG/DL — HIGH (ref 70–99)
GLUCOSE SERPL-MCNC: 131 MG/DL — HIGH (ref 70–99)
GLUCOSE SERPL-MCNC: 198 MG/DL — HIGH (ref 70–99)
GLUCOSE SERPL-MCNC: 92 MG/DL — SIGNIFICANT CHANGE UP (ref 70–99)
HCT VFR BLD CALC: 19.3 % — CRITICAL LOW (ref 39–50)
HCT VFR BLD CALC: 23.6 % — LOW (ref 39–50)
HCT VFR BLD CALC: 32.8 % — LOW (ref 39–50)
HGB BLD-MCNC: 10.8 G/DL — LOW (ref 13–17)
HGB BLD-MCNC: 6.6 G/DL — CRITICAL LOW (ref 13–17)
HGB BLD-MCNC: 8.3 G/DL — LOW (ref 13–17)
IMM GRANULOCYTES NFR BLD AUTO: 0.8 % — SIGNIFICANT CHANGE UP (ref 0–1.5)
LYMPHOCYTES # BLD AUTO: 1.18 K/UL — SIGNIFICANT CHANGE UP (ref 1–3.3)
LYMPHOCYTES # BLD AUTO: 11.3 % — LOW (ref 13–44)
MAGNESIUM SERPL-MCNC: 1.8 MG/DL — SIGNIFICANT CHANGE UP (ref 1.6–2.6)
MCHC RBC-ENTMCNC: 28.3 PG — SIGNIFICANT CHANGE UP (ref 27–34)
MCHC RBC-ENTMCNC: 30.3 PG — SIGNIFICANT CHANGE UP (ref 27–34)
MCHC RBC-ENTMCNC: 30.7 PG — SIGNIFICANT CHANGE UP (ref 27–34)
MCHC RBC-ENTMCNC: 32.9 GM/DL — SIGNIFICANT CHANGE UP (ref 32–36)
MCHC RBC-ENTMCNC: 34.2 GM/DL — SIGNIFICANT CHANGE UP (ref 32–36)
MCHC RBC-ENTMCNC: 35.2 GM/DL — SIGNIFICANT CHANGE UP (ref 32–36)
MCV RBC AUTO: 86.1 FL — SIGNIFICANT CHANGE UP (ref 80–100)
MCV RBC AUTO: 86.1 FL — SIGNIFICANT CHANGE UP (ref 80–100)
MCV RBC AUTO: 89.8 FL — SIGNIFICANT CHANGE UP (ref 80–100)
MONOCYTES # BLD AUTO: 0.61 K/UL — SIGNIFICANT CHANGE UP (ref 0–0.9)
MONOCYTES NFR BLD AUTO: 5.8 % — SIGNIFICANT CHANGE UP (ref 2–14)
NEUTROPHILS # BLD AUTO: 8.5 K/UL — HIGH (ref 1.8–7.4)
NEUTROPHILS NFR BLD AUTO: 81.5 % — HIGH (ref 43–77)
NRBC # BLD: 0 /100 WBCS — SIGNIFICANT CHANGE UP (ref 0–0)
PHOSPHATE SERPL-MCNC: 2.6 MG/DL — SIGNIFICANT CHANGE UP (ref 2.5–4.5)
PLATELET # BLD AUTO: 308 K/UL — SIGNIFICANT CHANGE UP (ref 150–400)
PLATELET # BLD AUTO: 66 K/UL — LOW (ref 150–400)
PLATELET # BLD AUTO: 68 K/UL — LOW (ref 150–400)
POTASSIUM SERPL-MCNC: 3.7 MMOL/L — SIGNIFICANT CHANGE UP (ref 3.5–5.3)
POTASSIUM SERPL-MCNC: 3.8 MMOL/L — SIGNIFICANT CHANGE UP (ref 3.5–5.3)
POTASSIUM SERPL-MCNC: 4 MMOL/L — SIGNIFICANT CHANGE UP (ref 3.5–5.3)
POTASSIUM SERPL-SCNC: 3.7 MMOL/L — SIGNIFICANT CHANGE UP (ref 3.5–5.3)
POTASSIUM SERPL-SCNC: 3.8 MMOL/L — SIGNIFICANT CHANGE UP (ref 3.5–5.3)
POTASSIUM SERPL-SCNC: 4 MMOL/L — SIGNIFICANT CHANGE UP (ref 3.5–5.3)
PROT SERPL-MCNC: 5.9 G/DL — LOW (ref 6–8.3)
PROT SERPL-MCNC: 6.2 G/DL — SIGNIFICANT CHANGE UP (ref 6–8.3)
RBC # BLD: 2.15 M/UL — LOW (ref 4.2–5.8)
RBC # BLD: 2.74 M/UL — LOW (ref 4.2–5.8)
RBC # BLD: 3.81 M/UL — LOW (ref 4.2–5.8)
RBC # FLD: 14 % — SIGNIFICANT CHANGE UP (ref 10.3–14.5)
RBC # FLD: 15.8 % — HIGH (ref 10.3–14.5)
RBC # FLD: 16.8 % — HIGH (ref 10.3–14.5)
SODIUM SERPL-SCNC: 141 MMOL/L — SIGNIFICANT CHANGE UP (ref 135–145)
SODIUM SERPL-SCNC: 141 MMOL/L — SIGNIFICANT CHANGE UP (ref 135–145)
SODIUM SERPL-SCNC: 145 MMOL/L — SIGNIFICANT CHANGE UP (ref 135–145)
TROPONIN T SERPL-MCNC: 0.04 NG/ML — CRITICAL HIGH (ref 0–0.01)
TROPONIN T SERPL-MCNC: 0.04 NG/ML — CRITICAL HIGH (ref 0–0.01)
WBC # BLD: 10.43 K/UL — SIGNIFICANT CHANGE UP (ref 3.8–10.5)
WBC # BLD: 15.19 K/UL — HIGH (ref 3.8–10.5)
WBC # BLD: 20.07 K/UL — HIGH (ref 3.8–10.5)
WBC # FLD AUTO: 10.43 K/UL — SIGNIFICANT CHANGE UP (ref 3.8–10.5)
WBC # FLD AUTO: 15.19 K/UL — HIGH (ref 3.8–10.5)
WBC # FLD AUTO: 20.07 K/UL — HIGH (ref 3.8–10.5)

## 2021-01-09 PROCEDURE — 93010 ELECTROCARDIOGRAM REPORT: CPT

## 2021-01-09 PROCEDURE — 99233 SBSQ HOSP IP/OBS HIGH 50: CPT | Mod: GC

## 2021-01-09 RX ORDER — ERYTHROPOIETIN 10000 [IU]/ML
6000 INJECTION, SOLUTION INTRAVENOUS; SUBCUTANEOUS ONCE
Refills: 0 | Status: COMPLETED | OUTPATIENT
Start: 2021-01-09 | End: 2021-01-09

## 2021-01-09 RX ORDER — POTASSIUM CHLORIDE 20 MEQ
10 PACKET (EA) ORAL ONCE
Refills: 0 | Status: COMPLETED | OUTPATIENT
Start: 2021-01-09 | End: 2021-01-09

## 2021-01-09 RX ORDER — POTASSIUM CHLORIDE 20 MEQ
10 PACKET (EA) ORAL
Refills: 0 | Status: DISCONTINUED | OUTPATIENT
Start: 2021-01-09 | End: 2021-01-09

## 2021-01-09 RX ADMIN — CHLORHEXIDINE GLUCONATE 1 APPLICATION(S): 213 SOLUTION TOPICAL at 07:14

## 2021-01-09 RX ADMIN — Medication 2.5 MILLIGRAM(S): at 17:07

## 2021-01-09 RX ADMIN — Medication 50 MILLIGRAM(S): at 19:15

## 2021-01-09 RX ADMIN — Medication 3 MILLILITER(S): at 05:57

## 2021-01-09 RX ADMIN — HEPARIN SODIUM 5000 UNIT(S): 5000 INJECTION INTRAVENOUS; SUBCUTANEOUS at 07:20

## 2021-01-09 RX ADMIN — SODIUM CHLORIDE 3 MILLILITER(S): 9 INJECTION INTRAMUSCULAR; INTRAVENOUS; SUBCUTANEOUS at 16:15

## 2021-01-09 RX ADMIN — Medication 3 MILLILITER(S): at 09:43

## 2021-01-09 RX ADMIN — Medication 50 MILLIEQUIVALENT(S): at 21:48

## 2021-01-09 RX ADMIN — Medication 2: at 07:26

## 2021-01-09 RX ADMIN — Medication 3 MILLILITER(S): at 17:07

## 2021-01-09 RX ADMIN — Medication 2: at 19:15

## 2021-01-09 RX ADMIN — SODIUM CHLORIDE 3 MILLILITER(S): 9 INJECTION INTRAMUSCULAR; INTRAVENOUS; SUBCUTANEOUS at 07:14

## 2021-01-09 RX ADMIN — Medication 3 MILLILITER(S): at 23:49

## 2021-01-09 RX ADMIN — Medication 2.5 MILLIGRAM(S): at 07:20

## 2021-01-09 RX ADMIN — Medication 2.5 MILLIGRAM(S): at 11:01

## 2021-01-09 RX ADMIN — Medication 2: at 11:23

## 2021-01-09 RX ADMIN — ERYTHROPOIETIN 6000 UNIT(S): 10000 INJECTION, SOLUTION INTRAVENOUS; SUBCUTANEOUS at 12:09

## 2021-01-09 RX ADMIN — SODIUM CHLORIDE 3 MILLILITER(S): 9 INJECTION INTRAMUSCULAR; INTRAVENOUS; SUBCUTANEOUS at 11:01

## 2021-01-09 RX ADMIN — SODIUM CHLORIDE 3 MILLILITER(S): 9 INJECTION INTRAMUSCULAR; INTRAVENOUS; SUBCUTANEOUS at 19:52

## 2021-01-09 RX ADMIN — Medication 50 MILLIGRAM(S): at 07:20

## 2021-01-09 NOTE — PROGRESS NOTE ADULT - SUBJECTIVE AND OBJECTIVE BOX
INCOMPLETE    OVERNIGHT EVENTS:    SUBJECTIVE / INTERVAL HPI: Patient seen and examined at bedside.     VITAL SIGNS:  Vital Signs Last 24 Hrs  T(C): 37.7 (09 Jan 2021 06:02), Max: 38.1 (08 Jan 2021 22:14)  T(F): 99.9 (09 Jan 2021 06:02), Max: 100.5 (08 Jan 2021 22:14)  HR: 75 (09 Jan 2021 08:00) (75 - 105)  BP: 146/65 (09 Jan 2021 08:00) (113/55 - 146/65)  BP(mean): 94 (09 Jan 2021 08:00) (78 - 95)  RR: 24 (09 Jan 2021 08:00) (19 - 47)  SpO2: 95% (09 Jan 2021 08:00) (92% - 100%)    PHYSICAL EXAM:    General: WDWN  HEENT: NC/AT; PERRL, anicteric sclera; MMM  Neck: supple  Cardiovascular: +S1/S2, RRR  Respiratory: CTA B/L; no W/R/R  Gastrointestinal: soft, NT/ND; +BSx4  Extremities: WWP; no edema, clubbing or cyanosis  Vascular: 2+ radial, DP/PT pulses B/L  Neurological: AAOx3; no focal deficits    MEDICATIONS:  MEDICATIONS  (STANDING):  albuterol/ipratropium for Nebulization 3 milliLiter(s) Nebulizer every 6 hours  chlorhexidine 2% Cloths 1 Application(s) Topical <User Schedule>  dextrose 40% Gel 15 Gram(s) Oral once  dextrose 5%. 1000 milliLiter(s) (50 mL/Hr) IV Continuous <Continuous>  dextrose 5%. 1000 milliLiter(s) (100 mL/Hr) IV Continuous <Continuous>  dextrose 50% Injectable 25 Gram(s) IV Push once  dextrose 50% Injectable 12.5 Gram(s) IV Push once  dextrose 50% Injectable 25 Gram(s) IV Push once  dextrose 50% Injectable 25 Gram(s) IV Push once  epoetin florian-epbx (RETACRIT) Injectable 6000 Unit(s) IV Push once  glucagon  Injectable 1 milliGRAM(s) IntraMuscular once  heparin   Injectable 5000 Unit(s) SubCutaneous every 8 hours  hydrocortisone sodium succinate Injectable 50 milliGRAM(s) IV Push every 12 hours  insulin lispro (ADMELOG) corrective regimen sliding scale   SubCutaneous every 6 hours  metoprolol tartrate Injectable 2.5 milliGRAM(s) IV Push every 6 hours  polyethylene glycol 3350 17 Gram(s) Oral daily  sodium chloride 3%  Inhalation 3 milliLiter(s) Inhalation every 4 hours    MEDICATIONS  (PRN):      ALLERGIES:  Allergies    No Known Allergies    Intolerances        LABS:                        10.8   10.43 )-----------( 308      ( 09 Jan 2021 06:51 )             32.8     01-09    141  |  105  |  15  ----------------------------<  92  3.8   |  23  |  0.66    Ca    8.9      09 Jan 2021 06:51  Phos  2.6     01-09  Mg     1.8     01-09    TPro  6.2  /  Alb  3.6  /  TBili  0.4  /  DBili  x   /  AST  23  /  ALT  22  /  AlkPhos  74  01-09        CAPILLARY BLOOD GLUCOSE      POCT Blood Glucose.: 191 mg/dL (09 Jan 2021 07:18)      RADIOLOGY & ADDITIONAL TESTS: Reviewed. OVERNIGHT EVENTS: T100.5 overnight. RONNA.    SUBJECTIVE / INTERVAL HPI: Patient seen and examined at bedside.     VITAL SIGNS:  Vital Signs Last 24 Hrs  T(C): 37.7 (09 Jan 2021 06:02), Max: 38.1 (08 Jan 2021 22:14)  T(F): 99.9 (09 Jan 2021 06:02), Max: 100.5 (08 Jan 2021 22:14)  HR: 75 (09 Jan 2021 08:00) (75 - 105)  BP: 146/65 (09 Jan 2021 08:00) (113/55 - 146/65)  BP(mean): 94 (09 Jan 2021 08:00) (78 - 95)  RR: 24 (09 Jan 2021 08:00) (19 - 47)  SpO2: 95% (09 Jan 2021 08:00) (92% - 100%)    PHYSICAL EXAM:    General: WDWN  HEENT: NC/AT; PERRL, anicteric sclera; MMM  Neck: supple  Cardiovascular: +S1/S2, RRR  Respiratory: CTA B/L; no W/R/R  Gastrointestinal: soft, NT/ND; +BSx4  Extremities: WWP; no edema, clubbing or cyanosis  Vascular: 2+ radial, DP/PT pulses B/L  Neurological: AAOx3; no focal deficits    MEDICATIONS:  MEDICATIONS  (STANDING):  albuterol/ipratropium for Nebulization 3 milliLiter(s) Nebulizer every 6 hours  chlorhexidine 2% Cloths 1 Application(s) Topical <User Schedule>  dextrose 40% Gel 15 Gram(s) Oral once  dextrose 5%. 1000 milliLiter(s) (50 mL/Hr) IV Continuous <Continuous>  dextrose 5%. 1000 milliLiter(s) (100 mL/Hr) IV Continuous <Continuous>  dextrose 50% Injectable 25 Gram(s) IV Push once  dextrose 50% Injectable 12.5 Gram(s) IV Push once  dextrose 50% Injectable 25 Gram(s) IV Push once  dextrose 50% Injectable 25 Gram(s) IV Push once  epoetin florian-epbx (RETACRIT) Injectable 6000 Unit(s) IV Push once  glucagon  Injectable 1 milliGRAM(s) IntraMuscular once  heparin   Injectable 5000 Unit(s) SubCutaneous every 8 hours  hydrocortisone sodium succinate Injectable 50 milliGRAM(s) IV Push every 12 hours  insulin lispro (ADMELOG) corrective regimen sliding scale   SubCutaneous every 6 hours  metoprolol tartrate Injectable 2.5 milliGRAM(s) IV Push every 6 hours  polyethylene glycol 3350 17 Gram(s) Oral daily  sodium chloride 3%  Inhalation 3 milliLiter(s) Inhalation every 4 hours    MEDICATIONS  (PRN):      ALLERGIES:  Allergies    No Known Allergies    Intolerances        LABS:                        10.8   10.43 )-----------( 308      ( 09 Jan 2021 06:51 )             32.8     01-09    141  |  105  |  15  ----------------------------<  92  3.8   |  23  |  0.66    Ca    8.9      09 Jan 2021 06:51  Phos  2.6     01-09  Mg     1.8     01-09    TPro  6.2  /  Alb  3.6  /  TBili  0.4  /  DBili  x   /  AST  23  /  ALT  22  /  AlkPhos  74  01-09        CAPILLARY BLOOD GLUCOSE      POCT Blood Glucose.: 191 mg/dL (09 Jan 2021 07:18)      RADIOLOGY & ADDITIONAL TESTS: Reviewed. OVERNIGHT EVENTS: T100.5 overnight. RONNA.    SUBJECTIVE / INTERVAL HPI: Patient seen and examined at bedside. Pt not amenable to questioning, denies chest pain or abdominal pain but refuses other questions.    VITAL SIGNS:  Vital Signs Last 24 Hrs  T(C): 37.7 (09 Jan 2021 06:02), Max: 38.1 (08 Jan 2021 22:14)  T(F): 99.9 (09 Jan 2021 06:02), Max: 100.5 (08 Jan 2021 22:14)  HR: 75 (09 Jan 2021 08:00) (75 - 105)  BP: 146/65 (09 Jan 2021 08:00) (113/55 - 146/65)  BP(mean): 94 (09 Jan 2021 08:00) (78 - 95)  RR: 24 (09 Jan 2021 08:00) (19 - 47)  SpO2: 95% (09 Jan 2021 08:00) (92% - 100%)    PHYSICAL EXAM:    General: WDWN  HEENT: NC/AT; PERRL, mildly iicteric sclera; MMM  Neck: supple  Cardiovascular: +S1/S2, RRR  Respiratory: CTA B/L; no W/R/R  Gastrointestinal: soft, NT/ND; +BSx4  Extremities: WWP; no edema, clubbing or cyanosis  Vascular: 2+ radial, DP pulses B/L  Neurological: AAOx3; no focal deficits    MEDICATIONS:  MEDICATIONS  (STANDING):  albuterol/ipratropium for Nebulization 3 milliLiter(s) Nebulizer every 6 hours  chlorhexidine 2% Cloths 1 Application(s) Topical <User Schedule>  dextrose 40% Gel 15 Gram(s) Oral once  dextrose 5%. 1000 milliLiter(s) (50 mL/Hr) IV Continuous <Continuous>  dextrose 5%. 1000 milliLiter(s) (100 mL/Hr) IV Continuous <Continuous>  dextrose 50% Injectable 25 Gram(s) IV Push once  dextrose 50% Injectable 12.5 Gram(s) IV Push once  dextrose 50% Injectable 25 Gram(s) IV Push once  dextrose 50% Injectable 25 Gram(s) IV Push once  epoetin florian-epbx (RETACRIT) Injectable 6000 Unit(s) IV Push once  glucagon  Injectable 1 milliGRAM(s) IntraMuscular once  heparin   Injectable 5000 Unit(s) SubCutaneous every 8 hours  hydrocortisone sodium succinate Injectable 50 milliGRAM(s) IV Push every 12 hours  insulin lispro (ADMELOG) corrective regimen sliding scale   SubCutaneous every 6 hours  metoprolol tartrate Injectable 2.5 milliGRAM(s) IV Push every 6 hours  polyethylene glycol 3350 17 Gram(s) Oral daily  sodium chloride 3%  Inhalation 3 milliLiter(s) Inhalation every 4 hours    MEDICATIONS  (PRN):      ALLERGIES:  Allergies    No Known Allergies    Intolerances        LABS:                        10.8   10.43 )-----------( 308      ( 09 Jan 2021 06:51 )             32.8     01-09    141  |  105  |  15  ----------------------------<  92  3.8   |  23  |  0.66    Ca    8.9      09 Jan 2021 06:51  Phos  2.6     01-09  Mg     1.8     01-09    TPro  6.2  /  Alb  3.6  /  TBili  0.4  /  DBili  x   /  AST  23  /  ALT  22  /  AlkPhos  74  01-09        CAPILLARY BLOOD GLUCOSE      POCT Blood Glucose.: 191 mg/dL (09 Jan 2021 07:18)      RADIOLOGY & ADDITIONAL TESTS: Reviewed.

## 2021-01-09 NOTE — PROGRESS NOTE ADULT - SUBJECTIVE AND OBJECTIVE BOX
CC: HYPOGLYCEMIA        INTERVAL HISTORY:  currently on hemodialysis  eyes open       ROS: No chest pain, no sob, no abd pain. No n/v/d    PAST MEDICAL & SURGICAL HISTORY:  Medical history unknown    No significant past surgical history        PHYSICAL EXAM:  T(C): 37.2 (01-09-21 @ 09:38), Max: 38.1 (01-08-21 @ 22:14)  HR: 88 (01-09-21 @ 10:00)  BP: 133/63 (01-09-21 @ 10:00) (113/55 - 169/70)  RR: 31 (01-09-21 @ 10:00)  SpO2: 100% (01-09-21 @ 10:00)  Wt(kg): --  I&O's Summary    08 Jan 2021 07:01  -  09 Jan 2021 07:00  --------------------------------------------------------  IN: 50 mL / OUT: 1965 mL / NET: -1915 mL    09 Jan 2021 07:01  -  09 Jan 2021 11:08  --------------------------------------------------------  IN: 0 mL / OUT: 200 mL / NET: -200 mL      Weight   General:  NAD.  HEENT: moist mucous membranes, no pallor/cyanosis.  Neck: no JVD visible.  Cardiac: S1, S2. RRR. No murmurs   Respratory: CTA b/l, no access muscle use.   Abdomen: soft. nontender. nondistended  Skin: no rashes.  Extremities: no LE edema b/l  Access: R femoral      DATA:                        6.6<LL>  15.19<H> )-----------( 68<L>    ( 09 Jan 2021 08:14 )             19.3<LL>    Ferritin, Serum: 43465 ng/mL <H> (12-31 @ 23:46)      145    |  105    |  70<H>  ----------------------------<  198<H>  Ca:7.6<L> (09 Jan 2021 09:27)  4.0     |  21<L>  |  3.49<H>      eGFR if Non : 15 <L>  eGFR if : 18 <L>    TPro  5.9<L>  /  Alb  3.0<L>  /  TBili  4.5<H>  /  DBili  x      /  AST  57<H>  /  ALT  308<H>  /  AlkPhos  134<H>  09 Jan 2021 09:27                    MEDICATIONS  (STANDING):  albuterol/ipratropium for Nebulization 3 milliLiter(s) Nebulizer every 6 hours  chlorhexidine 2% Cloths 1 Application(s) Topical <User Schedule>  dextrose 40% Gel 15 Gram(s) Oral once  dextrose 5%. 1000 milliLiter(s) (50 mL/Hr) IV Continuous <Continuous>  dextrose 5%. 1000 milliLiter(s) (100 mL/Hr) IV Continuous <Continuous>  dextrose 50% Injectable 25 Gram(s) IV Push once  dextrose 50% Injectable 12.5 Gram(s) IV Push once  dextrose 50% Injectable 25 Gram(s) IV Push once  dextrose 50% Injectable 25 Gram(s) IV Push once  epoetin florian-epbx (RETACRIT) Injectable 6000 Unit(s) IV Push once  glucagon  Injectable 1 milliGRAM(s) IntraMuscular once  heparin   Injectable 5000 Unit(s) SubCutaneous every 8 hours  hydrocortisone sodium succinate Injectable 50 milliGRAM(s) IV Push every 12 hours  insulin lispro (ADMELOG) corrective regimen sliding scale   SubCutaneous every 6 hours  metoprolol tartrate Injectable 2.5 milliGRAM(s) IV Push every 6 hours  polyethylene glycol 3350 17 Gram(s) Oral daily  sodium chloride 3%  Inhalation 3 milliLiter(s) Inhalation every 4 hours    MEDICATIONS  (PRN):

## 2021-01-09 NOTE — PROGRESS NOTE ADULT - PROBLEM SELECTOR PLAN 1
non-oliguric ANTONELLA  follow lytes to assess need for continued HD  currently tolerating dialysis well for clearance  plan for UF of 1L  receiving 1U PRBC with HD

## 2021-01-09 NOTE — PROGRESS NOTE ADULT - PROBLEM SELECTOR PLAN 1
hemodialysis terminated after close to two hours for rapid A. fibrillation  will re-assess tomorrow in AM for further dialytic intervention

## 2021-01-09 NOTE — PROGRESS NOTE ADULT - ASSESSMENT
86 y/o man presented initially to [facility] ED after EMS found patient down on the street hypothermic to 77F and with altered mental status. Pt admitted to MICU for further evaluation and management of hypothermia, shock, and r/o ischemic bowel/ gastrointestinal hemorrhage.  intubated on admission and extubated two days ago  called for ischemic ATN requiring CVVHD initially, and no intermittent HD

## 2021-01-09 NOTE — PROGRESS NOTE ADULT - ASSESSMENT
INCOMPLETE    84 y/o man with no reported PMHx (possibly Afib, states not on any medications) who presented initially to [facility] ED after EMS found patient down on the street hypothermic to 77F and with altered mental status. Pt admitted to MICU for further evaluation and management of hypothermia, shock, and r/o ischemic bowel/ gastrointestinal hemorrhage. Intubated 12/31, now extubated 1/7 without complications. Completed 7 day course of abx for presumed PNA. Now starting Q4H chest physiotherapy s/p extubation.     NEURO  - Weaned off sedation in AM    #Encephalopathy  Patient presented to [facility] with AMS. CTH noncontrast without acute bleed or fracture. Alcohol level elevated with hx of alcohol use. UTox negative  Pt initially with severe hypothermia, metabolic derangements, and shock likely contributing to altered mental status.   NOW RESOLVED   - Weaned off sedation 1/7, mentating well, pleasantly demented     CARDIOVASCULAR  #SHOCK  Pt found to be hypotensive to 80s/40s likely 2/2 to hemorrhagic/hypovolemic shock vs septic shock (unclear source) vs distributive (rewarming). Hemorrhagic source evidenced by Hgb drop from 8.2 to 5.9 with coffee grounds suctioned from sump placed. Pt with alerted mentation, low urine output, and cold to touch. s/p volume resuscitation with blood products as well as intermittent fluid boluses  - Weaned of levo gtt 1/7   - Completed 7 day zosyn course (-1/8)    #Afib with RVR  - Intermittently in Afib with RVR on 1/2   - SOSA w/ Grade I left ventricular diastolic dysfunction, Aortic sclerosis without significant stenosis, Trace aortic regurgitation, Moderate tricuspid regurgitation.  - c/w lopressor 2.5 q6    PULMONARY  #Respiratory Failure  Pt not hypoxic but with tachypnea, increased WOB with use of accessory muscles. Pt intubated due to concern for eventual respiratory failure and air way protection  - EXTUBATED 1/7 without complications  - Currently on 2L NC satting >95%  - Duoneb, hypertonic saline, chest physiotherapy q4h for 2 days (1/8-1/10)   - PT/OT for deconditioned state     #PNA vs atelectasis  - Completed 7 day zosyn course (-1/8)  - MRSA swab negative  - Increased secretions this morning w/ bump in WBC. Will hold off on continuing abx at this time.   - Continue with chest PT as above     GASTROINTESTINAL  - PENDING SPEECH AND SWALLOW EVAL IN AM; will place NGT if he doesn't pass     #r/o Bowel Ischemia  Pt with Hb 5.8 upon MICU arrival, with coffee ground output from sump. In the setting of shock and low Hb, and [facility],   - CTA A/P performed with no intestinal bleed or mesenteric ischemia  - Surgery consulted. no surgical intervention at this time     #r/o Gastrointestinal Hemorrhage  See above  - s/p IV protonix gtt and octreotide gtt  - d/c octreotide drip due to no evidence of portal HTN or cirrhosis on imagining   - c/w PPI daily    #Acute liver failure  LFTs > 7000, with coagulopathy and Utox negative. Acetaminophen level negative. Alcohol level 26. Hx of alcohol use. Liver failure possibly 2/2 to shock liver   - IMPROVING     RENAL  #Anion Gap Metabolic Acidosis  Likely 2/2 lactic acidosis. AG of 28 on presentation with pH 7.19, pCO2 15, bicarb 6 on arrival to MICU. Initiated on bicarb gtt here and s/p 2 amps bicarb  - RESOLVED    #Acute Renal Failure  Pt with sCr 2.93 on presentation (unknown baseline), improved s/p fluid boluses in ED. 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 In the setting of ANTONELLA  - Resolved     INFECTIOUS DISEASE  #LLL consolidation vs atelectasis  - Completed 7 day zosyn course (-1/8)  - MRSA swab negative    ENDOCRINE  - No active issues     HEME  #Normocytic Anemia   On presentation, patient with Hb 8.2, with repeat of 5.8 on MICU arrival. Now s/p 2u pRBC, with posttransfusion Hb 8.8. s/p 2 U PRBC 12/31  - Hgb stable  - Trend CBC  - Maintain active T&S    #Thrombocytopenia   Plt 70 on presentation-->20s Likely 2/2 DIC in setting of septic shock versus ITP. Per Heme/Onc, HLH was initially a consideration given markedly elevated ferritin (30k) Send CD25/IL-2, NK cell activity.  However it is unlikely with improving LDH and transaminases have markedly improved.   - Continue to monitor CBC    #Elevated INR  In the setting of acute liver failure and DIC likely from hypothermia. Repeat INR 2.39  - s/p IV vitamin K 10mg daily x 3 days per GI  - s/p 4 U FFP (01/01), 1 U Cryoprecipitate (01/01)    MSK  #R pubic rim fracture  Noted on CTA abd/pelvis with adjacent hematoma without active signs of bleeding.  - Ortho consulted. Currently no surgical intervention indicated at this time.      - Madden removed     RHEUM  - Per collateral obtained by PCP patient w/ hx of RA on prednisone 10 mg   - Continue hydrocortisone 50mg daily for 2 days 1/8, 1/9, then continue on home Prednisone 10mg    FLUIDS/ELECTROLYTES/NUTRITION  -IVF: None  -Monitor, Replete to K>4 and Mg>2  -Diet: SPEECH AND SWALLOW IN AM  PROPHYLAXIS  -DVT: Heparin 500 q8h  -GI ppx: Protonix daily  DISPO: MICU  CODE STATUS: FULL  84 y/o man with no reported PMHx (possibly Afib, states not on any medications) who presented initially to [facility] ED after EMS found patient down on the street hypothermic to 77F and with altered mental status. Pt admitted to MICU for further evaluation and management of hypothermia, shock, and r/o ischemic bowel/ gastrointestinal hemorrhage. Intubated 12/31, now extubated 1/7 without complications. Completed 7 day course of abx for presumed PNA. Now starting Q4H chest physiotherapy s/p extubation.     NEURO  - Weaned off sedation in AM  - AAOx3    #Encephalopathy  Patient presented to [facility] with AMS. CTH noncontrast without acute bleed or fracture. Alcohol level elevated with hx of alcohol use. UTox negative  Pt initially with severe hypothermia, metabolic derangements, and shock likely contributing to altered mental status.   NOW RESOLVED   - Weaned off sedation 1/7, mentating well, pleasantly demented     CARDIOVASCULAR  #SHOCK  Pt found to be hypotensive to 80s/40s likely 2/2 to hemorrhagic/hypovolemic shock vs septic shock (unclear source) vs distributive (rewarming). Hemorrhagic source evidenced by Hgb drop from 8.2 to 5.9 with coffee grounds suctioned from sump placed. Pt with alerted mentation, low urine output, and cold to touch. s/p volume resuscitation with blood products as well as intermittent fluid boluses  - Weaned of levo gtt 1/7   - Completed 7 day zosyn course (-1/8)    #Afib with RVR  - Intermittently in Afib with RVR on 1/2; noted to be in RVR to 170s on 1/9 during dialysis, spontaneously resolved  - SOSA w/ Grade I left ventricular diastolic dysfunction, Aortic sclerosis without significant stenosis, Trace aortic regurgitation, Moderate tricuspid regurgitation.  - c/w lopressor 2.5 q6, give additional pushes as needed  - Hold AC given GIB  - Trops trended to plateau after RVR 1/9    PULMONARY  #Respiratory Failure  Pt not hypoxic but with tachypnea, increased WOB with use of accessory muscles. Pt intubated due to concern for eventual respiratory failure and air way protection  - EXTUBATED 1/7 without complications  - Currently on 2L NC satting >95%  - Duoneb, hypertonic saline, chest physiotherapy q4h for 2 days (1/8-1/10)   - PT/OT for deconditioned state     #PNA vs atelectasis  - Completed 7 day zosyn course (-1/8)  - MRSA swab negative  - Increased secretions this morning w/ bump in WBC. Will hold off on continuing abx at this time.   - Continue with chest PT as above     GASTROINTESTINAL  - PENDING SPEECH AND SWALLOW EVAL (pt refused 1/9); will place NGT if he doesn't pass     #r/o Bowel Ischemia  Pt with Hb 5.8 upon MICU arrival, with coffee ground output from sump. In the setting of shock and low Hb, [facility],   - CTA A/P performed with no intestinal bleed or mesenteric ischemia  - Surgery consulted. no surgical intervention at this time     #r/o Gastrointestinal Hemorrhage  See above  - s/p IV protonix gtt and octreotide gtt  - d/c octreotide drip due to no evidence of portal HTN or cirrhosis on imagining   - c/w PPI daily    #Acute liver failure  LFTs > 7000, with coagulopathy and Utox negative. Acetaminophen level negative. Alcohol level 26. Hx of alcohol use. Liver failure possibly 2/2 to shock liver   - IMPROVING     RENAL  #Anion Gap Metabolic Acidosis  Likely 2/2 lactic acidosis. AG of 28 on presentation with pH 7.19, pCO2 15, bicarb 6 on arrival to MICU. Initiated on bicarb gtt here and s/p 2 amps bicarb  - RESOLVED    #Acute Renal Failure  Pt with sCr 2.93 on presentation (unknown baseline), improved s/p fluid boluses in ED. 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 In the setting of ANTONELLA  - Resolved     INFECTIOUS DISEASE  #LLL consolidation vs atelectasis  - Completed 7 day zosyn course (-1/8)  - MRSA swab negative    ENDOCRINE  - No active issues     HEME  #Normocytic Anemia   On presentation, patient with Hb 8.2, with repeat of 5.8 on MICU arrival. Now s/p 2u pRBC, with posttransfusion Hb 8.8. s/p 2 U PRBC 12/31  - Hg noted to drop to 6.6 on 1/9, given 1U pRBC with correction to 8.3 (see R pelvic hematoma below)  - Trend CBC  - Maintain active T&S    #Thrombocytopenia   Plt 70 on presentation-->20s Likely 2/2 DIC in setting of septic shock versus ITP. Per Heme/Onc, HLH was initially a consideration given markedly elevated ferritin (30k) Send CD25/IL-2, NK cell activity.  However it is unlikely with improving LDH and transaminases have markedly improved.   - Continue to monitor CBC    #Elevated INR  In the setting of acute liver failure and DIC likely from hypothermia. Repeat INR 2.39  - s/p IV vitamin K 10mg daily x 3 days per GI  - s/p 4 U FFP (01/01), 1 U Cryoprecipitate (01/01)    MSK  #R pubic rim fracture  Noted on CTA abd/pelvis with adjacent hematoma without active signs of bleeding.  - Ortho consulted. Currently no surgical intervention indicated at this time.    #Pelvic hematoma  Pt noted to have 7cm hematoma on CT A/P on admission.  - f/u repeat CT A/P to r/o expansion of hematoma given drop in Hg on 1/9      - Madden removed     RHEUM  - Per collateral obtained by PCP patient w/ hx of RA on prednisone 10 mg   - Continue hydrocortisone 50mg daily for 2 days 1/8, 1/9, then continue on home Prednisone 10mg    FLUIDS/ELECTROLYTES/NUTRITION  -IVF: None  -Monitor, Replete to K>4 and Mg>2  -Diet: SPEECH AND SWALLOW 1/10 (pt refused 1/9)  PROPHYLAXIS  -DVT: SCDs  -GI ppx: Protonix daily  DISPO: MICU  CODE STATUS: FULL

## 2021-01-10 DIAGNOSIS — D64.9 ANEMIA, UNSPECIFIED: ICD-10-CM

## 2021-01-10 LAB
ALBUMIN SERPL ELPH-MCNC: 3 G/DL — LOW (ref 3.3–5)
ALP SERPL-CCNC: 133 U/L — HIGH (ref 40–120)
ALT FLD-CCNC: 241 U/L — HIGH (ref 10–45)
ANION GAP SERPL CALC-SCNC: 18 MMOL/L — HIGH (ref 5–17)
ANISOCYTOSIS BLD QL: SLIGHT — SIGNIFICANT CHANGE UP
AST SERPL-CCNC: 53 U/L — HIGH (ref 10–40)
BASOPHILS # BLD AUTO: 0 K/UL — SIGNIFICANT CHANGE UP (ref 0–0.2)
BASOPHILS # BLD AUTO: 0.01 K/UL — SIGNIFICANT CHANGE UP (ref 0–0.2)
BASOPHILS NFR BLD AUTO: 0 % — SIGNIFICANT CHANGE UP (ref 0–2)
BASOPHILS NFR BLD AUTO: 0.1 % — SIGNIFICANT CHANGE UP (ref 0–2)
BILIRUB SERPL-MCNC: 4.1 MG/DL — HIGH (ref 0.2–1.2)
BLD GP AB SCN SERPL QL: NEGATIVE — SIGNIFICANT CHANGE UP
BUN SERPL-MCNC: 54 MG/DL — HIGH (ref 7–23)
CALCIUM SERPL-MCNC: 7.6 MG/DL — LOW (ref 8.4–10.5)
CHLORIDE SERPL-SCNC: 103 MMOL/L — SIGNIFICANT CHANGE UP (ref 96–108)
CO2 SERPL-SCNC: 24 MMOL/L — SIGNIFICANT CHANGE UP (ref 22–31)
CREAT SERPL-MCNC: 2.88 MG/DL — HIGH (ref 0.5–1.3)
DACRYOCYTES BLD QL SMEAR: SLIGHT — SIGNIFICANT CHANGE UP
EOSINOPHIL # BLD AUTO: 0 K/UL — SIGNIFICANT CHANGE UP (ref 0–0.5)
EOSINOPHIL # BLD AUTO: 0 K/UL — SIGNIFICANT CHANGE UP (ref 0–0.5)
EOSINOPHIL NFR BLD AUTO: 0 % — SIGNIFICANT CHANGE UP (ref 0–6)
EOSINOPHIL NFR BLD AUTO: 0 % — SIGNIFICANT CHANGE UP (ref 0–6)
GLUCOSE BLDC GLUCOMTR-MCNC: 128 MG/DL — HIGH (ref 70–99)
GLUCOSE BLDC GLUCOMTR-MCNC: 148 MG/DL — HIGH (ref 70–99)
GLUCOSE BLDC GLUCOMTR-MCNC: 163 MG/DL — HIGH (ref 70–99)
GLUCOSE BLDC GLUCOMTR-MCNC: 174 MG/DL — HIGH (ref 70–99)
GLUCOSE SERPL-MCNC: 171 MG/DL — HIGH (ref 70–99)
HAPTOGLOB SERPL-MCNC: 133 MG/DL — SIGNIFICANT CHANGE UP (ref 34–200)
HCT VFR BLD CALC: 21.2 % — LOW (ref 39–50)
HCT VFR BLD CALC: 21.6 % — LOW (ref 39–50)
HCT VFR BLD CALC: 22.5 % — LOW (ref 39–50)
HGB BLD-MCNC: 7.2 G/DL — LOW (ref 13–17)
HGB BLD-MCNC: 7.2 G/DL — LOW (ref 13–17)
HGB BLD-MCNC: 7.7 G/DL — LOW (ref 13–17)
HYPOCHROMIA BLD QL: SLIGHT — SIGNIFICANT CHANGE UP
IMM GRANULOCYTES NFR BLD AUTO: 4.2 % — HIGH (ref 0–1.5)
LDH SERPL L TO P-CCNC: 620 U/L — HIGH (ref 50–242)
LYMPHOCYTES # BLD AUTO: 0.48 K/UL — LOW (ref 1–3.3)
LYMPHOCYTES # BLD AUTO: 1.39 K/UL — SIGNIFICANT CHANGE UP (ref 1–3.3)
LYMPHOCYTES # BLD AUTO: 2.7 % — LOW (ref 13–44)
LYMPHOCYTES # BLD AUTO: 7.4 % — LOW (ref 13–44)
MACROCYTES BLD QL: SLIGHT — SIGNIFICANT CHANGE UP
MAGNESIUM SERPL-MCNC: 2.1 MG/DL — SIGNIFICANT CHANGE UP (ref 1.6–2.6)
MANUAL SMEAR VERIFICATION: SIGNIFICANT CHANGE UP
MCHC RBC-ENTMCNC: 30 PG — SIGNIFICANT CHANGE UP (ref 27–34)
MCHC RBC-ENTMCNC: 30.3 PG — SIGNIFICANT CHANGE UP (ref 27–34)
MCHC RBC-ENTMCNC: 30.6 PG — SIGNIFICANT CHANGE UP (ref 27–34)
MCHC RBC-ENTMCNC: 33.3 GM/DL — SIGNIFICANT CHANGE UP (ref 32–36)
MCHC RBC-ENTMCNC: 34 GM/DL — SIGNIFICANT CHANGE UP (ref 32–36)
MCHC RBC-ENTMCNC: 34.2 GM/DL — SIGNIFICANT CHANGE UP (ref 32–36)
MCV RBC AUTO: 87.5 FL — SIGNIFICANT CHANGE UP (ref 80–100)
MCV RBC AUTO: 90.2 FL — SIGNIFICANT CHANGE UP (ref 80–100)
MCV RBC AUTO: 90.8 FL — SIGNIFICANT CHANGE UP (ref 80–100)
METAMYELOCYTES # FLD: 0.9 % — HIGH (ref 0–0)
MICROCYTES BLD QL: SLIGHT — SIGNIFICANT CHANGE UP
MONOCYTES # BLD AUTO: 2.24 K/UL — HIGH (ref 0–0.9)
MONOCYTES # BLD AUTO: 5.68 K/UL — HIGH (ref 0–0.9)
MONOCYTES NFR BLD AUTO: 12.6 % — SIGNIFICANT CHANGE UP (ref 2–14)
MONOCYTES NFR BLD AUTO: 30.2 % — HIGH (ref 2–14)
NEUTROPHILS # BLD AUTO: 10.91 K/UL — HIGH (ref 1.8–7.4)
NEUTROPHILS # BLD AUTO: 14.92 K/UL — HIGH (ref 1.8–7.4)
NEUTROPHILS NFR BLD AUTO: 58.1 % — SIGNIFICANT CHANGE UP (ref 43–77)
NEUTROPHILS NFR BLD AUTO: 82 % — HIGH (ref 43–77)
NEUTS BAND # BLD: 1.8 % — SIGNIFICANT CHANGE UP (ref 0–8)
NRBC # BLD: 0 /100 WBCS — SIGNIFICANT CHANGE UP (ref 0–0)
NRBC # BLD: 0 /100 WBCS — SIGNIFICANT CHANGE UP (ref 0–0)
OVALOCYTES BLD QL SMEAR: SLIGHT — SIGNIFICANT CHANGE UP
PHOSPHATE SERPL-MCNC: 4.9 MG/DL — HIGH (ref 2.5–4.5)
PLAT MORPH BLD: NORMAL — SIGNIFICANT CHANGE UP
PLATELET # BLD AUTO: 77 K/UL — LOW (ref 150–400)
PLATELET # BLD AUTO: 80 K/UL — LOW (ref 150–400)
PLATELET # BLD AUTO: 84 K/UL — LOW (ref 150–400)
POIKILOCYTOSIS BLD QL AUTO: SIGNIFICANT CHANGE UP
POLYCHROMASIA BLD QL SMEAR: SLIGHT — SIGNIFICANT CHANGE UP
POTASSIUM SERPL-MCNC: 3.9 MMOL/L — SIGNIFICANT CHANGE UP (ref 3.5–5.3)
POTASSIUM SERPL-SCNC: 3.9 MMOL/L — SIGNIFICANT CHANGE UP (ref 3.5–5.3)
PROT SERPL-MCNC: 6 G/DL — SIGNIFICANT CHANGE UP (ref 6–8.3)
RBC # BLD: 2.35 M/UL — LOW (ref 4.2–5.8)
RBC # BLD: 2.38 M/UL — LOW (ref 4.2–5.8)
RBC # BLD: 2.57 M/UL — LOW (ref 4.2–5.8)
RBC # FLD: 16.3 % — HIGH (ref 10.3–14.5)
RBC # FLD: 16.5 % — HIGH (ref 10.3–14.5)
RBC # FLD: 16.7 % — HIGH (ref 10.3–14.5)
RBC BLD AUTO: ABNORMAL
RETICS #: 120.7 K/UL — SIGNIFICANT CHANGE UP (ref 25–125)
RETICS/RBC NFR: 4.8 % — HIGH (ref 0.5–2.5)
RH IG SCN BLD-IMP: POSITIVE — SIGNIFICANT CHANGE UP
SMUDGE CELLS # BLD: PRESENT — SIGNIFICANT CHANGE UP
SODIUM SERPL-SCNC: 145 MMOL/L — SIGNIFICANT CHANGE UP (ref 135–145)
SPHEROCYTES BLD QL SMEAR: SLIGHT — SIGNIFICANT CHANGE UP
TARGETS BLD QL SMEAR: SIGNIFICANT CHANGE UP
WBC # BLD: 15.9 K/UL — HIGH (ref 3.8–10.5)
WBC # BLD: 17.8 K/UL — HIGH (ref 3.8–10.5)
WBC # BLD: 18.78 K/UL — HIGH (ref 3.8–10.5)
WBC # FLD AUTO: 15.9 K/UL — HIGH (ref 3.8–10.5)
WBC # FLD AUTO: 17.8 K/UL — HIGH (ref 3.8–10.5)
WBC # FLD AUTO: 18.78 K/UL — HIGH (ref 3.8–10.5)

## 2021-01-10 PROCEDURE — 74176 CT ABD & PELVIS W/O CONTRAST: CPT | Mod: 26

## 2021-01-10 PROCEDURE — 99233 SBSQ HOSP IP/OBS HIGH 50: CPT | Mod: GC

## 2021-01-10 RX ORDER — METOPROLOL TARTRATE 50 MG
12.5 TABLET ORAL EVERY 12 HOURS
Refills: 0 | Status: DISCONTINUED | OUTPATIENT
Start: 2021-01-10 | End: 2021-01-11

## 2021-01-10 RX ORDER — HYDROCORTISONE 20 MG
40 TABLET ORAL ONCE
Refills: 0 | Status: COMPLETED | OUTPATIENT
Start: 2021-01-10 | End: 2021-01-10

## 2021-01-10 RX ORDER — METOPROLOL TARTRATE 50 MG
5 TABLET ORAL ONCE
Refills: 0 | Status: COMPLETED | OUTPATIENT
Start: 2021-01-10 | End: 2021-01-10

## 2021-01-10 RX ADMIN — SODIUM CHLORIDE 3 MILLILITER(S): 9 INJECTION INTRAMUSCULAR; INTRAVENOUS; SUBCUTANEOUS at 07:21

## 2021-01-10 RX ADMIN — Medication 3 MILLILITER(S): at 07:21

## 2021-01-10 RX ADMIN — Medication 2.5 MILLIGRAM(S): at 01:00

## 2021-01-10 RX ADMIN — Medication 5 MILLIGRAM(S): at 22:46

## 2021-01-10 RX ADMIN — Medication 2: at 05:44

## 2021-01-10 RX ADMIN — SODIUM CHLORIDE 3 MILLILITER(S): 9 INJECTION INTRAMUSCULAR; INTRAVENOUS; SUBCUTANEOUS at 20:52

## 2021-01-10 RX ADMIN — Medication 40 MILLIGRAM(S): at 07:25

## 2021-01-10 RX ADMIN — Medication 3 MILLILITER(S): at 20:51

## 2021-01-10 RX ADMIN — Medication 2: at 11:24

## 2021-01-10 RX ADMIN — CHLORHEXIDINE GLUCONATE 1 APPLICATION(S): 213 SOLUTION TOPICAL at 07:20

## 2021-01-10 RX ADMIN — SODIUM CHLORIDE 3 MILLILITER(S): 9 INJECTION INTRAMUSCULAR; INTRAVENOUS; SUBCUTANEOUS at 00:00

## 2021-01-10 RX ADMIN — Medication 2.5 MILLIGRAM(S): at 07:26

## 2021-01-10 RX ADMIN — Medication 12.5 MILLIGRAM(S): at 17:02

## 2021-01-10 RX ADMIN — SODIUM CHLORIDE 3 MILLILITER(S): 9 INJECTION INTRAMUSCULAR; INTRAVENOUS; SUBCUTANEOUS at 17:01

## 2021-01-10 RX ADMIN — Medication 3 MILLILITER(S): at 17:00

## 2021-01-10 RX ADMIN — Medication 3 MILLILITER(S): at 09:19

## 2021-01-10 RX ADMIN — SODIUM CHLORIDE 3 MILLILITER(S): 9 INJECTION INTRAMUSCULAR; INTRAVENOUS; SUBCUTANEOUS at 13:25

## 2021-01-10 NOTE — PROGRESS NOTE ADULT - PROBLEM SELECTOR PLAN 1
hemodialysis curtailed yesterday secondary to RVR with HR into the 150s  received 2 hours of HD with minimal UF  no acute indications for HD today  most probably will get HD on Monday 1/11

## 2021-01-10 NOTE — PROGRESS NOTE ADULT - ASSESSMENT
84 y/o man presented initially to [facility] ED after EMS found patient down on the street hypothermic to 77F and with altered mental status. Pt admitted to MICU for further evaluation and management of hypothermia, shock, and r/o ischemic bowel/ gastrointestinal hemorrhage.  intubated on admission and extubated two days ago  called for ischemic ATN requiring CVVHD initially, and no intermittent HD

## 2021-01-10 NOTE — PROGRESS NOTE ADULT - PROBLEM SELECTOR PLAN 2
continued work-up for anemia  CT scan without evidence of bleed  Hematology evaluation  GI evaluation for coffee ground output continued work-up for anemia  CT scan to rule out bleed  Hematology evaluation  GI evaluation for coffee ground output

## 2021-01-10 NOTE — PROGRESS NOTE ADULT - ASSESSMENT
86 y/o man with no reported PMHx (possibly Afib, states not on any medications) who presented initially to [facility] ED after EMS found patient down on the street hypothermic to 77F and with altered mental status. Pt admitted to MICU for further evaluation and management of hypothermia, shock, and r/o ischemic bowel/ gastrointestinal hemorrhage. Intubated 12/31, now extubated 1/7 without complications. Completed 7 day course of abx for presumed PNA. Course further complicated by new L psoas hematoma found 1/10; pending IR intervention.     NEURO  - Off sedation, pleasantly demented (at baseline)     CARDIOVASCULAR  #SHOCK  Pt found to be hypotensive to 80s/40s likely 2/2 to hemorrhagic/hypovolemic shock vs septic shock (unclear source) vs distributive (rewarming). Hemorrhagic source evidenced by Hgb drop from 8.2 to 5.9 with coffee grounds suctioned from sump placed. Pt with alerted mentation, low urine output, and cold to touch. s/p volume resuscitation with blood products as well as intermittent fluid boluses. s/p levo gtt (weaned 1/7) and 7 day zosyn course (-1/8).  RESOLVED     #Afib with RVR  - Intermittently in Afib with RVR on 1/2; noted to be in RVR to 170s on 1/9 during dialysis, spontaneously resolved  - SOSA w/ Grade I left ventricular diastolic dysfunction, Aortic sclerosis without significant stenosis, Trace aortic regurgitation, Moderate tricuspid regurgitation.  - Transition from Lopressor 2.5 q6 to Lopressor po 12.5 q12h   - Hold AC in setting of new hematoma     PULMONARY  #Respiratory Failure  Pt not hypoxic but with tachypnea, increased WOB with use of accessory muscles. Pt intubated due to concern for eventual respiratory failure and air way protection  - EXTUBATED 1/7 without complications. s/p duoneb, hypertonic saline, chest physiotherapy q4h for 2 days (1/8-1/10)   - Currently on 2L NC satting >95%  - PT/OT for deconditioned state     #PNA vs atelectasis  - Completed 7 day zosyn course (-1/8)    GASTROINTESTINAL  - Evaluated by speech and swallow 1/10: cleared for puree thin liquids  - NPO after midnight for possible intervention w/ IR tomorrow     #Acute liver failure  LFTs > 7000, with coagulopathy and Utox negative. Acetaminophen level negative. Alcohol level 26. Hx of alcohol use. Liver failure possibly 2/2 to shock liver   - IMPROVING     RENAL  #Acute Renal Failure  Pt with sCr 2.93 on presentation (unknown baseline), improved s/p fluid boluses in ED. 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    - CVVHD stopped 1/7  - Intermittent hemodialysis per renal   - Continue with bladder scans     INFECTIOUS DISEASE  #LLL consolidation vs atelectasis  - Completed 7 day zosyn course (-1/8)    ENDOCRINE  - No active issues     HEME  #L psoas hematoma  New L psoas hematoma found on CT A/P 1/10  - IR consulted for possible embolization; will hold off for now given pt is hemodynamically stable  - NPO after midnight for possible intervention in AM  - If plan for embolization tomorrow, will coordinate with renal for HD   - Monitor CBC as above with active T&S    #Normocytic Anemia   - Hg noted to drop to 6.6 on 1/9, given 1U pRBC with correction to 8.3   - Hg 7.7 1/10 with repeat Hg of 7.2 in the afternoon   - Drop in Hg 2/2 new L psoas hematoma found on CT abd/pelvis 1/10   - Trend CBC  - Maintain active T&S    #Thrombocytopenia   Plt 70 on presentation-->20s Likely 2/2 DIC in setting of septic shock versus ITP. Per Heme/Onc, HLH was initially a consideration given markedly elevated ferritin (30k) Send CD25/IL-2, NK cell activity.  However it is unlikely with improving LDH and transaminases have markedly improved.   - Continue to monitor CBC    #Elevated INR  In the setting of acute liver failure and DIC likely from hypothermia. Repeat INR 2.39  - s/p IV vitamin K 10mg daily x 3 days per GI  - s/p 4 U FFP (01/01), 1 U Cryoprecipitate (01/01)    #R Pelvic hematoma  Pt noted to have 7cm hematoma on CT A/P on admission.    MSK  #R pubic rim fracture  Noted on CTA abd/pelvis with adjacent hematoma without active signs of bleeding.  - Ortho consulted. Currently no surgical intervention indicated at this time.      - No active issues     RHEUM  Per collateral obtained by PCP patient w/ hx of RA on prednisone 10 mg   - Continue Prednisone 10mg    FLUIDS/ELECTROLYTES/NUTRITION  -IVF: None  -Monitor, Replete to K>4 and Mg>2  -Diet: Puree thin liquids, NPO AFTER MIDNIGHT   PROPHYLAXIS  -DVT: SCDs  -GI ppx: Protonix daily  DISPO: MICU  CODE STATUS: FULL

## 2021-01-10 NOTE — PROGRESS NOTE ADULT - SUBJECTIVE AND OBJECTIVE BOX
CC: HYPOGLYCEMIA        INTERVAL HISTORY:  resting in bed in NAD      ROS: No chest pain, no sob, no abd pain. No n/v/d    PAST MEDICAL & SURGICAL HISTORY:  Medical history unknown    No significant past surgical history        PHYSICAL EXAM:  T(C): 37.6 (01-10-21 @ 09:39), Max: 37.6 (01-10-21 @ 09:39)  HR: 80 (01-10-21 @ 10:00)  BP: 141/63 (01-10-21 @ 10:00) (114/62 - 160/77)  RR: 15 (01-10-21 @ 10:00)  SpO2: 94% (01-10-21 @ 10:00)  Wt(kg): --  I&O's Summary    09 Jan 2021 07:01  -  10 Abram 2021 07:00  --------------------------------------------------------  IN: 1250 mL / OUT: 2340 mL / NET: -1090 mL      Weight   General: ,  NAD.  HEENT: moist mucous membranes, no pallor/cyanosis.  Neck: no JVD visible.  Cardiac: S1, S2. RRR. No murmurs   Respratory: CTA b/l, no access muscle use.   Abdomen: soft. nontender. nondistended  Skin: no rashes.  Extremities: no LE edema b/l  Access: R femoral      DATA:                        7.7<L>  17.80<H> )-----------( 77<L>    ( 10 Abram 2021 06:42 )             22.5<L>    Ferritin, Serum: 06058 ng/mL <H> (12-31 @ 23:46)      145    |  103    |  54<H>  ----------------------------<  171<H>  Ca:7.6<L> (10 Abram 2021 06:42)  3.9     |  24     |  2.88<H>      eGFR if Non : 19 <L>  eGFR if : 22 <L>    TPro  6.0    /  Alb  3.0<L>  /  TBili  4.1<H>  /  DBili  x      /  AST  53<H>  /  ALT  241<H>  /  AlkPhos  133<H>  10 Abram 2021 06:42                    MEDICATIONS  (STANDING):  albuterol/ipratropium for Nebulization 3 milliLiter(s) Nebulizer every 6 hours  chlorhexidine 2% Cloths 1 Application(s) Topical <User Schedule>  dextrose 40% Gel 15 Gram(s) Oral once  dextrose 5%. 1000 milliLiter(s) (50 mL/Hr) IV Continuous <Continuous>  dextrose 5%. 1000 milliLiter(s) (100 mL/Hr) IV Continuous <Continuous>  dextrose 50% Injectable 25 Gram(s) IV Push once  dextrose 50% Injectable 12.5 Gram(s) IV Push once  dextrose 50% Injectable 25 Gram(s) IV Push once  dextrose 50% Injectable 25 Gram(s) IV Push once  glucagon  Injectable 1 milliGRAM(s) IntraMuscular once  insulin lispro (ADMELOG) corrective regimen sliding scale   SubCutaneous every 6 hours  metoprolol tartrate Injectable 2.5 milliGRAM(s) IV Push every 6 hours  polyethylene glycol 3350 17 Gram(s) Oral daily  sodium chloride 3%  Inhalation 3 milliLiter(s) Inhalation every 4 hours    MEDICATIONS  (PRN):

## 2021-01-10 NOTE — PROGRESS NOTE ADULT - SUBJECTIVE AND OBJECTIVE BOX
INCOMPLETE NOTE     HOSPITAL COURSE:       INTERVAL HPI/OVERNIGHT EVENTS:   No overnight events   Afebrile, hemodynamically stable     ICU Vital Signs Last 24 Hrs  T(C): 37.6 (10 Abram 2021 09:39), Max: 37.6 (10 Abram 2021 09:39)  T(F): 99.7 (10 Abram 2021 09:39), Max: 99.7 (10 Abram 2021 09:39)  HR: 80 (10 Abram 2021 10:00) (66 - 121)  BP: 141/63 (10 Abram 2021 10:00) (114/62 - 156/74)  BP(mean): 91 (10 Abram 2021 10:00) (80 - 108)  ABP: --  ABP(mean): --  RR: 15 (10 Abram 2021 10:00) (14 - 29)  SpO2: 94% (10 Abram 2021 10:00) (92% - 99%)    I&O's Summary    09 Jan 2021 07:01  -  10 Abram 2021 07:00  --------------------------------------------------------  IN: 1250 mL / OUT: 2340 mL / NET: -1090 mL      LABS:                        7.7    17.80 )-----------( 77       ( 10 Abram 2021 06:42 )             22.5     01-10    145  |  103  |  54<H>  ----------------------------<  171<H>  3.9   |  24  |  2.88<H>    Ca    7.6<L>      10 Abram 2021 06:42  Phos  4.9     01-10  Mg     2.1     01-10    TPro  6.0  /  Alb  3.0<L>  /  TBili  4.1<H>  /  DBili  x   /  AST  53<H>  /  ALT  241<H>  /  AlkPhos  133<H>  01-10    CAPILLARY BLOOD GLUCOSE    POCT Blood Glucose.: 174 mg/dL (10 Abram 2021 05:40)  POCT Blood Glucose.: 135 mg/dL (09 Jan 2021 22:53)  POCT Blood Glucose.: 164 mg/dL (09 Jan 2021 19:02)  POCT Blood Glucose.: 185 mg/dL (09 Jan 2021 11:06)    RADIOLOGY & ADDITIONAL TESTS:    Consultant(s) Notes Reviewed:  [x ] YES  [ ] NO    MEDICATIONS  (STANDING):  albuterol/ipratropium for Nebulization 3 milliLiter(s) Nebulizer every 6 hours  chlorhexidine 2% Cloths 1 Application(s) Topical <User Schedule>  dextrose 40% Gel 15 Gram(s) Oral once  dextrose 5%. 1000 milliLiter(s) (50 mL/Hr) IV Continuous <Continuous>  dextrose 5%. 1000 milliLiter(s) (100 mL/Hr) IV Continuous <Continuous>  dextrose 50% Injectable 25 Gram(s) IV Push once  dextrose 50% Injectable 12.5 Gram(s) IV Push once  dextrose 50% Injectable 25 Gram(s) IV Push once  dextrose 50% Injectable 25 Gram(s) IV Push once  glucagon  Injectable 1 milliGRAM(s) IntraMuscular once  insulin lispro (ADMELOG) corrective regimen sliding scale   SubCutaneous every 6 hours  metoprolol tartrate Injectable 2.5 milliGRAM(s) IV Push every 6 hours  polyethylene glycol 3350 17 Gram(s) Oral daily  sodium chloride 3%  Inhalation 3 milliLiter(s) Inhalation every 4 hours    MEDICATIONS  (PRN):    PHYSICAL EXAM:  GENERAL:   HEAD:  Atraumatic, Normocephalic  EYES: EOMI, PERRLA, conjunctiva and sclera clear  NECK: Supple, No JVD, Normal thyroid, no enlarged nodes  NERVOUS SYSTEM:  Alert & Awake.   CHEST/LUNG: B/L good air entry; No rales, rhonchi, or wheezing  HEART: S1S2 normal, no S3, Regular rate and rhythm; No murmurs  ABDOMEN: Soft, Nontender, Nondistended; Bowel sounds present  EXTREMITIES:  2+ Peripheral Pulses, No clubbing, cyanosis, or edema  LYMPH: No lymphadenopathy noted  SKIN: No rashes or lesions    Care Discussed with Consultants/Other Providers [ x] YES  [ ] NO INTERVAL HPI/OVERNIGHT EVENTS:   No overnight events   Afebrile, hemodynamically stable     Patient seen and examined at bedside. Reports feeling well without any complaints.     ICU Vital Signs Last 24 Hrs  T(C): 37.6 (10 Abram 2021 09:39), Max: 37.6 (10 Abram 2021 09:39)  T(F): 99.7 (10 Abram 2021 09:39), Max: 99.7 (10 Abram 2021 09:39)  HR: 80 (10 Abram 2021 10:00) (66 - 121)  BP: 141/63 (10 Abram 2021 10:00) (114/62 - 156/74)  BP(mean): 91 (10 Abram 2021 10:00) (80 - 108)  ABP: --  ABP(mean): --  RR: 15 (10 Abram 2021 10:00) (14 - 29)  SpO2: 94% (10 Abram 2021 10:00) (92% - 99%)    I&O's Summary    09 Jan 2021 07:01  -  10 Abram 2021 07:00  --------------------------------------------------------  IN: 1250 mL / OUT: 2340 mL / NET: -1090 mL      LABS:                        7.7    17.80 )-----------( 77       ( 10 Abram 2021 06:42 )             22.5     01-10    145  |  103  |  54<H>  ----------------------------<  171<H>  3.9   |  24  |  2.88<H>    Ca    7.6<L>      10 Abram 2021 06:42  Phos  4.9     01-10  Mg     2.1     01-10    TPro  6.0  /  Alb  3.0<L>  /  TBili  4.1<H>  /  DBili  x   /  AST  53<H>  /  ALT  241<H>  /  AlkPhos  133<H>  01-10    CAPILLARY BLOOD GLUCOSE    POCT Blood Glucose.: 174 mg/dL (10 Abram 2021 05:40)  POCT Blood Glucose.: 135 mg/dL (09 Jan 2021 22:53)  POCT Blood Glucose.: 164 mg/dL (09 Jan 2021 19:02)  POCT Blood Glucose.: 185 mg/dL (09 Jan 2021 11:06)    RADIOLOGY & ADDITIONAL TESTS:    Consultant(s) Notes Reviewed:  [x ] YES  [ ] NO    MEDICATIONS  (STANDING):  albuterol/ipratropium for Nebulization 3 milliLiter(s) Nebulizer every 6 hours  chlorhexidine 2% Cloths 1 Application(s) Topical <User Schedule>  dextrose 40% Gel 15 Gram(s) Oral once  dextrose 5%. 1000 milliLiter(s) (50 mL/Hr) IV Continuous <Continuous>  dextrose 5%. 1000 milliLiter(s) (100 mL/Hr) IV Continuous <Continuous>  dextrose 50% Injectable 25 Gram(s) IV Push once  dextrose 50% Injectable 12.5 Gram(s) IV Push once  dextrose 50% Injectable 25 Gram(s) IV Push once  dextrose 50% Injectable 25 Gram(s) IV Push once  glucagon  Injectable 1 milliGRAM(s) IntraMuscular once  insulin lispro (ADMELOG) corrective regimen sliding scale   SubCutaneous every 6 hours  metoprolol tartrate Injectable 2.5 milliGRAM(s) IV Push every 6 hours  polyethylene glycol 3350 17 Gram(s) Oral daily  sodium chloride 3%  Inhalation 3 milliLiter(s) Inhalation every 4 hours    MEDICATIONS  (PRN):    PHYSICAL EXAM:  General: Elderly, no acute distress  HEENT: NC/AT; PERRL, anicteric sclera; MMM  Neck: Supple, no JVP  Cardiovascular: +S1/S2; RRR  Respiratory: CTA bilaterally   Gastrointestinal: Soft, NT/ND; +BSx4  Extremities: WWP; no edema, clubbing or cyanosis  Vascular: 2+ radial, DP/PT pulses B/L  Neurological: Alert, oriented to self. Pleasantly demented. Generalized weakness on exam in setting of deconditioned state     Care Discussed with Consultants/Other Providers [ x] YES  [ ] NO

## 2021-01-11 DIAGNOSIS — R79.1 ABNORMAL COAGULATION PROFILE: ICD-10-CM

## 2021-01-11 DIAGNOSIS — T14.8XXA OTHER INJURY OF UNSPECIFIED BODY REGION, INITIAL ENCOUNTER: ICD-10-CM

## 2021-01-11 DIAGNOSIS — K72.00 ACUTE AND SUBACUTE HEPATIC FAILURE WITHOUT COMA: ICD-10-CM

## 2021-01-11 DIAGNOSIS — I48.91 UNSPECIFIED ATRIAL FIBRILLATION: ICD-10-CM

## 2021-01-11 DIAGNOSIS — Z29.9 ENCOUNTER FOR PROPHYLACTIC MEASURES, UNSPECIFIED: ICD-10-CM

## 2021-01-11 DIAGNOSIS — S32.599A OTHER SPECIFIED FRACTURE OF UNSPECIFIED PUBIS, INITIAL ENCOUNTER FOR CLOSED FRACTURE: ICD-10-CM

## 2021-01-11 LAB
ALBUMIN SERPL ELPH-MCNC: 3 G/DL — LOW (ref 3.3–5)
ALP SERPL-CCNC: 131 U/L — HIGH (ref 40–120)
ALT FLD-CCNC: 225 U/L — HIGH (ref 10–45)
ANION GAP SERPL CALC-SCNC: 16 MMOL/L — SIGNIFICANT CHANGE UP (ref 5–17)
APTT BLD: 30 SEC — SIGNIFICANT CHANGE UP (ref 27.5–35.5)
AST SERPL-CCNC: 62 U/L — HIGH (ref 10–40)
BILIRUB SERPL-MCNC: 4.6 MG/DL — HIGH (ref 0.2–1.2)
BLD GP AB SCN SERPL QL: NEGATIVE — SIGNIFICANT CHANGE UP
BUN SERPL-MCNC: 60 MG/DL — HIGH (ref 7–23)
CALCIUM SERPL-MCNC: 7.4 MG/DL — LOW (ref 8.4–10.5)
CHLORIDE SERPL-SCNC: 103 MMOL/L — SIGNIFICANT CHANGE UP (ref 96–108)
CO2 SERPL-SCNC: 23 MMOL/L — SIGNIFICANT CHANGE UP (ref 22–31)
CREAT SERPL-MCNC: 2.66 MG/DL — HIGH (ref 0.5–1.3)
D DIMER BLD IA.RAPID-MCNC: 3296 NG/ML DDU — HIGH
FIBRINOGEN PPP-MCNC: 254 MG/DL — LOW (ref 258–438)
GLUCOSE BLDC GLUCOMTR-MCNC: 100 MG/DL — HIGH (ref 70–99)
GLUCOSE BLDC GLUCOMTR-MCNC: 154 MG/DL — HIGH (ref 70–99)
GLUCOSE BLDC GLUCOMTR-MCNC: 172 MG/DL — HIGH (ref 70–99)
GLUCOSE SERPL-MCNC: 151 MG/DL — HIGH (ref 70–99)
HCT VFR BLD CALC: 20.3 % — CRITICAL LOW (ref 39–50)
HCT VFR BLD CALC: 21.5 % — LOW (ref 39–50)
HGB BLD-MCNC: 6.8 G/DL — CRITICAL LOW (ref 13–17)
HGB BLD-MCNC: 7.2 G/DL — LOW (ref 13–17)
INR BLD: 1.18 — HIGH (ref 0.88–1.16)
MAGNESIUM SERPL-MCNC: 1.7 MG/DL — SIGNIFICANT CHANGE UP (ref 1.6–2.6)
MCHC RBC-ENTMCNC: 30.3 PG — SIGNIFICANT CHANGE UP (ref 27–34)
MCHC RBC-ENTMCNC: 31.2 PG — SIGNIFICANT CHANGE UP (ref 27–34)
MCHC RBC-ENTMCNC: 33.5 GM/DL — SIGNIFICANT CHANGE UP (ref 32–36)
MCHC RBC-ENTMCNC: 33.5 GM/DL — SIGNIFICANT CHANGE UP (ref 32–36)
MCV RBC AUTO: 90.3 FL — SIGNIFICANT CHANGE UP (ref 80–100)
MCV RBC AUTO: 93.1 FL — SIGNIFICANT CHANGE UP (ref 80–100)
NRBC # BLD: 0 /100 WBCS — SIGNIFICANT CHANGE UP (ref 0–0)
NRBC # BLD: 0 /100 WBCS — SIGNIFICANT CHANGE UP (ref 0–0)
PHOSPHATE SERPL-MCNC: 4.2 MG/DL — SIGNIFICANT CHANGE UP (ref 2.5–4.5)
PLATELET # BLD AUTO: 85 K/UL — LOW (ref 150–400)
PLATELET # BLD AUTO: 91 K/UL — LOW (ref 150–400)
POTASSIUM SERPL-MCNC: 3.6 MMOL/L — SIGNIFICANT CHANGE UP (ref 3.5–5.3)
POTASSIUM SERPL-SCNC: 3.6 MMOL/L — SIGNIFICANT CHANGE UP (ref 3.5–5.3)
PROT SERPL-MCNC: 6.2 G/DL — SIGNIFICANT CHANGE UP (ref 6–8.3)
PROTHROM AB SERPL-ACNC: 14 SEC — HIGH (ref 10.6–13.6)
RBC # BLD: 2.18 M/UL — LOW (ref 4.2–5.8)
RBC # BLD: 2.38 M/UL — LOW (ref 4.2–5.8)
RBC # FLD: 17 % — HIGH (ref 10.3–14.5)
RBC # FLD: 17.4 % — HIGH (ref 10.3–14.5)
RH IG SCN BLD-IMP: POSITIVE — SIGNIFICANT CHANGE UP
SODIUM SERPL-SCNC: 142 MMOL/L — SIGNIFICANT CHANGE UP (ref 135–145)
WBC # BLD: 12.98 K/UL — HIGH (ref 3.8–10.5)
WBC # BLD: 17.4 K/UL — HIGH (ref 3.8–10.5)
WBC # FLD AUTO: 12.98 K/UL — HIGH (ref 3.8–10.5)
WBC # FLD AUTO: 17.4 K/UL — HIGH (ref 3.8–10.5)

## 2021-01-11 PROCEDURE — 99232 SBSQ HOSP IP/OBS MODERATE 35: CPT | Mod: GC

## 2021-01-11 PROCEDURE — 99233 SBSQ HOSP IP/OBS HIGH 50: CPT | Mod: GC

## 2021-01-11 RX ORDER — METOPROLOL TARTRATE 50 MG
25 TABLET ORAL EVERY 12 HOURS
Refills: 0 | Status: DISCONTINUED | OUTPATIENT
Start: 2021-01-11 | End: 2021-01-12

## 2021-01-11 RX ADMIN — SODIUM CHLORIDE 3 MILLILITER(S): 9 INJECTION INTRAMUSCULAR; INTRAVENOUS; SUBCUTANEOUS at 04:36

## 2021-01-11 RX ADMIN — SODIUM CHLORIDE 3 MILLILITER(S): 9 INJECTION INTRAMUSCULAR; INTRAVENOUS; SUBCUTANEOUS at 12:19

## 2021-01-11 RX ADMIN — Medication 3 MILLILITER(S): at 09:21

## 2021-01-11 RX ADMIN — Medication 3 MILLILITER(S): at 22:33

## 2021-01-11 RX ADMIN — Medication 12.5 MILLIGRAM(S): at 05:57

## 2021-01-11 RX ADMIN — Medication 2: at 18:45

## 2021-01-11 RX ADMIN — Medication 3 MILLILITER(S): at 16:41

## 2021-01-11 RX ADMIN — SODIUM CHLORIDE 3 MILLILITER(S): 9 INJECTION INTRAMUSCULAR; INTRAVENOUS; SUBCUTANEOUS at 00:24

## 2021-01-11 RX ADMIN — SODIUM CHLORIDE 3 MILLILITER(S): 9 INJECTION INTRAMUSCULAR; INTRAVENOUS; SUBCUTANEOUS at 09:23

## 2021-01-11 RX ADMIN — Medication 25 MILLIGRAM(S): at 17:44

## 2021-01-11 RX ADMIN — SODIUM CHLORIDE 3 MILLILITER(S): 9 INJECTION INTRAMUSCULAR; INTRAVENOUS; SUBCUTANEOUS at 16:41

## 2021-01-11 RX ADMIN — Medication 2: at 11:50

## 2021-01-11 RX ADMIN — Medication 3 MILLILITER(S): at 04:35

## 2021-01-11 RX ADMIN — Medication 10 MILLIGRAM(S): at 05:57

## 2021-01-11 RX ADMIN — CHLORHEXIDINE GLUCONATE 1 APPLICATION(S): 213 SOLUTION TOPICAL at 06:07

## 2021-01-11 RX ADMIN — SODIUM CHLORIDE 3 MILLILITER(S): 9 INJECTION INTRAMUSCULAR; INTRAVENOUS; SUBCUTANEOUS at 22:33

## 2021-01-11 RX ADMIN — POLYETHYLENE GLYCOL 3350 17 GRAM(S): 17 POWDER, FOR SOLUTION ORAL at 11:50

## 2021-01-11 NOTE — PROGRESS NOTE ADULT - ASSESSMENT
84 y/o man with no reported PMHx (possibly Afib, states not on any medications) who presented initially to [facility] ED after EMS found patient down on the street hypothermic to 77F and with altered mental status. Pt admitted to MICU for further evaluation and management of hypothermia, shock, and r/o ischemic bowel/ gastrointestinal hemorrhage. Intubated 12/31, now extubated 1/7 without complications. Completed 7 day course of abx for presumed PNA. Course further complicated by new L psoas hematoma found 1/10; pending IR intervention.

## 2021-01-11 NOTE — PROGRESS NOTE ADULT - PROBLEM SELECTOR PLAN 1
hg 8.8 on 12/31 arrival.   - Hg noted to drop to 6.6 on 1/9, given 1U pRBC with correction to 8.3   - Hg at 6.8 on 1/11 - ordered 1 upRBC   - Drop in Hg 2/2 new L psoas hematoma found on CT abd/pelvis 1/10   - Trend CBC  - Maintain active T&S

## 2021-01-11 NOTE — PROGRESS NOTE ADULT - SUBJECTIVE AND OBJECTIVE BOX
ARIADNA BELL  85y  Male    Patient is a 85y old  Male who presents with a chief complaint of AMS, hypothermic, (11 Jan 2021 12:56)  Acceptance Note: MICU STEPDOWN TO [address] [place] [address] COURSE: 84 y/o man with no reported PMHx (possibly Afib, states not on any medications) who presented initially to [facility] ED after EMS found patient down on the street hypothermic to 77F and with altered mental status. Patient transferred to [facility] for further evaluation and management of hypothermia, shock, and r/o ischemic bowel/gastrointestinal hemorrhage. Upon arrival to the Medical ICU, pt was on NC but in respiratory distress using accessory muscles and belly breathing. Pt was initially placed on BiPAP to decrease work of breathing but decision was made to intubate the patient for respiratory distress. Pt found to be hypotensive to 80s/40s likely 2/2 to hemorrhagic/hypovolemic shock vs septic shock. Hemorrhagic source evidenced by Hgb drop from 8.2 to 5.9 with coffee grounds suctioned from sump placed. GI consulted for coffee grounds in suction canister. Surgery consulted for concern for mesenteric ischemia given persistently elevated lactate and consistency of NGT output. Surgery signed off since there was no evidence of mesenteric ischemia on CTA. GI signed off given there's no active GIB on CTA. Ortho consulted for incidental finding of R inferior pubic ramus fx on CT A/P - no acute intervention per the team. Nephrology consulted for ANTONELLA and hyperkalemia; s/p CVVHD 1/1-1/7. Patient also with platelet 70 on presentation-->20s Likely 2/2 DIC in setting of septic shock versus ITP. Elevated INR 2.39 on admission in setting of acute liver failure. s/p 2 U PRBC (12/31), 4 U Plasma (01/01), 1 U Cryoprecipitate (01/01). DIC now resolved. Shock liver on admission now improving. Prolonged PT/PTT improving. Patient weaned off levo gtt 1/7. Completed 7 day zosyn course (-1/8). EXTUBATED 1/7 without complications. IV lopressor transitioned to PO for hx of afib w/ rvr. Renal following for intermittent dialysis. Mental status at baseline (pleasantly demented). Course further complicated by Hgb <7 on 1/09 requiring one unit of PRBC. Found to have a new L psoas hematoma on CT 1/10. IR consulted, however no plan for intervention from their part right now given pt is hemodynamically stable. [facility]       INTERVAL HPI/OVERNIGHT EVENTS:      T(C): 37.2 (01-11-21 @ 14:32), Max: 37.6 (01-10-21 @ 17:00)  HR: 75 (01-11-21 @ 15:00) (66 - 113)  BP: 127/57 (01-11-21 @ 15:00) (98/54 - 160/67)  RR: 18 (01-11-21 @ 15:00) (12 - 27)  SpO2: 98% (01-11-21 @ 15:00) (96% - 100%)  Wt(kg): --Vital Signs Last 24 Hrs  T(C): 37.2 (11 Jan 2021 14:32), Max: 37.6 (10 Abram 2021 17:00)  T(F): 98.9 (11 Jan 2021 14:32), Max: 99.6 (10 Abram 2021 17:00)  HR: 75 (11 Jan 2021 15:00) (66 - 113)  BP: 127/57 (11 Jan 2021 15:00) (98/54 - 160/67)  BP(mean): 82 (11 Jan 2021 15:00) (72 - 121)  RR: 18 (11 Jan 2021 15:00) (12 - 27)  SpO2: 98% (11 Jan 2021 15:00) (96% - 100%)    PHYSICAL EXAM:  GENERAL: NAD, well-groomed, well-developed  HEAD:  Atraumatic, Normocephalic  EYES: EOMI, PERRLA, conjunctiva and sclera clear  ENMT: No tonsillar erythema, exudates, or enlargement; Moist mucous membranes, Good dentition, No lesions  NECK: Supple, No JVD, Normal thyroid  NERVOUS SYSTEM:  Alert & Oriented X3, Good concentration; Motor Strength 5/5 B/L upper and lower extremities; DTRs 2+ intact and symmetric  CHEST/LUNG: Clear to auscultation bilaterally; No rales, rhonchi, wheezing, or rubs  HEART: Regular rate and rhythm; No murmurs, rubs, or gallops  ABDOMEN: Soft, Nontender, Nondistended; Bowel sounds present  EXTREMITIES:  WWP, No clubbing, cyanosis, or edema  Vascular: 2+ peripheral pulses x4  LYMPH: No lymphadenopathy noted  SKIN: No rashes or lesions    Consultant(s) Notes Reviewed:  [x ] YES  [ ] NO  Care Discussed with Consultants/Other Providers [ x] YES  [ ] NO    LABS:                        6.8    12.98 )-----------( 85       ( 11 Jan 2021 13:28 )             20.3     01-11    142  |  103  |  60<H>  ----------------------------<  151<H>  3.6   |  23  |  2.66<H>    Ca    7.4<L>      11 Jan 2021 06:54  Phos  4.2     01-11  Mg     1.7     01-11    TPro  6.2  /  Alb  3.0<L>  /  TBili  4.6<H>  /  DBili  x   /  AST  62<H>  /  ALT  225<H>  /  AlkPhos  131<H>  01-11      PT/INR - ( 11 Jan 2021 06:54 )   PT: 14.0 sec;   INR: 1.18          PTT - ( 11 Jan 2021 06:54 )  PTT:30.0 sec    CAPILLARY BLOOD GLUCOSE      POCT Blood Glucose.: 172 mg/dL (11 Jan 2021 11:33)  POCT Blood Glucose.: 100 mg/dL (11 Jan 2021 05:54)  POCT Blood Glucose.: 128 mg/dL (10 Abram 2021 23:07)  POCT Blood Glucose.: 148 mg/dL (10 Abram 2021 16:58)            RADIOLOGY & ADDITIONAL TESTS:    Imaging Personally Reviewed:  [ ] YES  [ ] NO    HEALTH ISSUES - PROBLEM Dx:  Anemia  Anemia    ESRD (end stage renal disease)  ESRD (end stage renal disease)    ANTONELLA (acute kidney injury)  ANTONELLA (acute kidney injury)    Encounter for palliative care  Encounter for palliative care    Functional quadriplegia  Functional quadriplegia    Septic shock  Septic shock    Acute respiratory failure with hypoxia  Acute respiratory failure with hypoxia         ARIADNA BELL  85y  Male    Patient is a 85y old  Male who presents with a chief complaint of AMS, hypothermic, (11 Jan 2021 12:56)  Acceptance Note: MICU STEPDOWN TO [address] [place] [address] COURSE: 86 y/o man with no reported PMHx (possibly Afib, states not on any medications) who presented initially to [facility] ED after EMS found patient down on the street hypothermic to 77F and with altered mental status. Patient transferred to [facility] for further evaluation and management of hypothermia, shock, and r/o ischemic bowel/gastrointestinal hemorrhage. Upon arrival to the Medical ICU, pt was on NC but in respiratory distress using accessory muscles and belly breathing. Pt was initially placed on BiPAP to decrease work of breathing but decision was made to intubate the patient for respiratory distress. Pt found to be hypotensive to 80s/40s likely 2/2 to hemorrhagic/hypovolemic shock vs septic shock. Hemorrhagic source evidenced by Hgb drop from 8.2 to 5.9 with coffee grounds suctioned from sump placed. GI consulted for coffee grounds in suction canister. Surgery consulted for concern for mesenteric ischemia given persistently elevated lactate and consistency of NGT output. Surgery signed off since there was no evidence of mesenteric ischemia on CTA. GI signed off given there's no active GIB on CTA. Ortho consulted for incidental finding of R inferior pubic ramus fx on CT A/P - no acute intervention per the team. Nephrology consulted for ANTONELLA and hyperkalemia; s/p CVVHD 1/1-1/7. Patient also with platelet 70 on presentation-->20s Likely 2/2 DIC in setting of septic shock versus ITP. Elevated INR 2.39 on admission in setting of acute liver failure. s/p 2 U PRBC (12/31), 4 U Plasma (01/01), 1 U Cryoprecipitate (01/01). DIC now resolved. Shock liver on admission now improving. Prolonged PT/PTT improving. Patient weaned off levo gtt 1/7. Completed 7 day zosyn course (-1/8). EXTUBATED 1/7 without complications. IV lopressor transitioned to PO for hx of afib w/ rvr. Renal following for intermittent dialysis. Mental status at baseline (pleasantly demented). Course further complicated by Hgb <7 on 1/09 requiring one unit of PRBC. Found to have a new L psoas hematoma on CT 1/10. IR consulted, however no plan for intervention from their part right now given pt is hemodynamically stable. Patient is stable for transfer to [facility].       INTERVAL HPI/OVERNIGHT EVENTS: Pt examined at bedside, currently has no complaints outside of wanting to move around. Denies any lightheadedness, HA, N/V/D/C, chest pain       T(C): 37.2 (01-11-21 @ 14:32), Max: 37.6 (01-10-21 @ 17:00)  HR: 75 (01-11-21 @ 15:00) (66 - 113)  BP: 127/57 (01-11-21 @ 15:00) (98/54 - 160/67)  RR: 18 (01-11-21 @ 15:00) (12 - 27)  SpO2: 98% (01-11-21 @ 15:00) (96% - 100%)  Wt(kg): --Vital Signs Last 24 Hrs  T(C): 37.2 (11 Jan 2021 14:32), Max: 37.6 (10 Abram 2021 17:00)  T(F): 98.9 (11 Jan 2021 14:32), Max: 99.6 (10 Abram 2021 17:00)  HR: 75 (11 Jan 2021 15:00) (66 - 113)  BP: 127/57 (11 Jan 2021 15:00) (98/54 - 160/67)  BP(mean): 82 (11 Jan 2021 15:00) (72 - 121)  RR: 18 (11 Jan 2021 15:00) (12 - 27)  SpO2: 98% (11 Jan 2021 15:00) (96% - 100%)    PHYSICAL EXAM:  GENERAL: NAD, well-groomed, well-developed  HEAD:  Atraumatic, Normocephalic  EYES: EOMI, PERRLA, conjunctiva and sclera clear  ENMT: No tonsillar erythema, exudates, or enlargement; Moist mucous membranes, Good dentition, No lesions  NECK: Supple, No JVD, Normal thyroid  NERVOUS SYSTEM:  Alert & Oriented X3, Good concentration; Motor Strength 5/5 B/L upper and lower extremities; DTRs 2+ intact and symmetric  CHEST/LUNG: Clear to auscultation bilaterally; No rales, rhonchi, wheezing, or rubs  HEART: Regular rate and rhythm; No murmurs, rubs, or gallops  ABDOMEN: Soft, Nontender, Nondistended; Bowel sounds present  EXTREMITIES:  WWP, No clubbing, cyanosis, or edema  Vascular: 2+ peripheral pulses x4  LYMPH: No lymphadenopathy noted  SKIN: No rashes or lesions    Consultant(s) Notes Reviewed:  [x ] YES  [ ] NO  Care Discussed with Consultants/Other Providers [ x] YES  [ ] NO    LABS:                        6.8    12.98 )-----------( 85       ( 11 Jan 2021 13:28 )             20.3     01-11    142  |  103  |  60<H>  ----------------------------<  151<H>  3.6   |  23  |  2.66<H>    Ca    7.4<L>      11 Jan 2021 06:54  Phos  4.2     01-11  Mg     1.7     01-11    TPro  6.2  /  Alb  3.0<L>  /  TBili  4.6<H>  /  DBili  x   /  AST  62<H>  /  ALT  225<H>  /  AlkPhos  131<H>  01-11      PT/INR - ( 11 Jan 2021 06:54 )   PT: 14.0 sec;   INR: 1.18          PTT - ( 11 Jan 2021 06:54 )  PTT:30.0 sec    CAPILLARY BLOOD GLUCOSE      POCT Blood Glucose.: 172 mg/dL (11 Jan 2021 11:33)  POCT Blood Glucose.: 100 mg/dL (11 Jan 2021 05:54)  POCT Blood Glucose.: 128 mg/dL (10 Abram 2021 23:07)  POCT Blood Glucose.: 148 mg/dL (10 Abram 2021 16:58)            RADIOLOGY & ADDITIONAL TESTS:    Imaging Personally Reviewed:  [ ] YES  [ ] NO    HEALTH ISSUES - PROBLEM Dx:  Anemia  Anemia    ESRD (end stage renal disease)  ESRD (end stage renal disease)    ANTONELLA (acute kidney injury)  ANTONELLA (acute kidney injury)    Encounter for palliative care  Encounter for palliative care    Functional quadriplegia  Functional quadriplegia    Septic shock  Septic shock    Acute respiratory failure with hypoxia  Acute respiratory failure with hypoxia         ARIADNA BELL  85y  Male    Patient is a 85y old  Male who presents with a chief complaint of AMS, hypothermic, (11 Jan 2021 12:56)  Acceptance Note: MICU STEPDOWN TO [address] [place] [address] COURSE: 84 y/o man with no reported PMHx (possibly Afib, states not on any medications) who presented initially to [facility] ED after EMS found patient down on the street hypothermic to 77F and with altered mental status. Patient transferred to [facility] for further evaluation and management of hypothermia, shock, and r/o ischemic bowel/gastrointestinal hemorrhage. Upon arrival to the Medical ICU, pt was on NC but in respiratory distress using accessory muscles and belly breathing. Pt was initially placed on BiPAP to decrease work of breathing but decision was made to intubate the patient for respiratory distress. Pt found to be hypotensive to 80s/40s likely 2/2 to hemorrhagic/hypovolemic shock vs septic shock. Hemorrhagic source evidenced by Hgb drop from 8.2 to 5.9 with coffee grounds suctioned from sump placed. GI consulted for coffee grounds in suction canister. Surgery consulted for concern for mesenteric ischemia given persistently elevated lactate and consistency of NGT output. Surgery signed off since there was no evidence of mesenteric ischemia on CTA. GI signed off given there's no active GIB on CTA. Ortho consulted for incidental finding of R inferior pubic ramus fx on CT A/P - no acute intervention per the team. Nephrology consulted for ANTONELLA and hyperkalemia; s/p CVVHD 1/1-1/7. Patient also with platelet 70 on presentation-->20s Likely 2/2 DIC in setting of septic shock versus ITP. Elevated INR 2.39 on admission in setting of acute liver failure. s/p 2 U PRBC (12/31), 4 U Plasma (01/01), 1 U Cryoprecipitate (01/01). DIC now resolved. Shock liver on admission now improving. Prolonged PT/PTT improving. Patient weaned off levo gtt 1/7. Completed 7 day zosyn course (-1/8). EXTUBATED 1/7 without complications. IV lopressor transitioned to PO for hx of afib w/ rvr. Renal following for intermittent dialysis. Mental status at baseline (pleasantly demented). Course further complicated by Hgb <7 on 1/09 requiring one unit of PRBC. Found to have a new L psoas hematoma on CT 1/10. IR consulted, however no plan for intervention from their part right now given pt is hemodynamically stable. Patient is stable for transfer to [facility].       INTERVAL HPI/OVERNIGHT EVENTS: Pt examined at bedside, currently has no complaints outside of wanting to move around. Denies any lightheadedness, HA, N/V/D/C, chest pain, shortness of breath, dysuria, abdominal pain.           T(C): 37.2 (01-11-21 @ 14:32), Max: 37.6 (01-10-21 @ 17:00)  HR: 75 (01-11-21 @ 15:00) (66 - 113)  BP: 127/57 (01-11-21 @ 15:00) (98/54 - 160/67)  RR: 18 (01-11-21 @ 15:00) (12 - 27)  SpO2: 98% (01-11-21 @ 15:00) (96% - 100%)  Wt(kg): --Vital Signs Last 24 Hrs  T(C): 37.2 (11 Jan 2021 14:32), Max: 37.6 (10 Abram 2021 17:00)  T(F): 98.9 (11 Jan 2021 14:32), Max: 99.6 (10 Abram 2021 17:00)  HR: 75 (11 Jan 2021 15:00) (66 - 113)  BP: 127/57 (11 Jan 2021 15:00) (98/54 - 160/67)  BP(mean): 82 (11 Jan 2021 15:00) (72 - 121)  RR: 18 (11 Jan 2021 15:00) (12 - 27)  SpO2: 98% (11 Jan 2021 15:00) (96% - 100%)    PHYSICAL EXAM:  GENERAL: NAD  HEAD:  Atraumatic, Normocephalic  EYES: EOMI, PERRLA, conjunctiva and sclera clear  ENMT: Moist mucous membranes  NECK: Supple, No JVD  NERVOUS SYSTEM: Alert & Oriented X3, Good concentration  CHEST/LUNG: Clear to auscultation bilaterally; No rales, rhonchi, wheezing, or rubs  HEART: Irregular rate and rhythm; No murmurs, rubs, or gallops  ABDOMEN: Soft, Nontender, Nondistended; Bowel sounds present  EXTREMITIES:  1+ edema of the L upper thigh compared to none on the R. Trace pedal edema B/L  Vascular: 2+ peripheral pulses x4      Consultant(s) Notes Reviewed:  [x ] YES  [ ] NO  Care Discussed with Consultants/Other Providers [ x] YES  [ ] NO    LABS:                        6.8    12.98 )-----------( 85       ( 11 Jan 2021 13:28 )             20.3     01-11    142  |  103  |  60<H>  ----------------------------<  151<H>  3.6   |  23  |  2.66<H>    Ca    7.4<L>      11 Jan 2021 06:54  Phos  4.2     01-11  Mg     1.7     01-11    TPro  6.2  /  Alb  3.0<L>  /  TBili  4.6<H>  /  DBili  x   /  AST  62<H>  /  ALT  225<H>  /  AlkPhos  131<H>  01-11      PT/INR - ( 11 Jan 2021 06:54 )   PT: 14.0 sec;   INR: 1.18          PTT - ( 11 Jan 2021 06:54 )  PTT:30.0 sec    CAPILLARY BLOOD GLUCOSE      POCT Blood Glucose.: 172 mg/dL (11 Jan 2021 11:33)  POCT Blood Glucose.: 100 mg/dL (11 Jan 2021 05:54)  POCT Blood Glucose.: 128 mg/dL (10 Abram 2021 23:07)  POCT Blood Glucose.: 148 mg/dL (10 Abram 2021 16:58)            RADIOLOGY & ADDITIONAL TESTS:    Imaging Personally Reviewed:  [ ] YES  [ ] NO    HEALTH ISSUES - PROBLEM Dx:  Anemia  Anemia    ESRD (end stage renal disease)  ESRD (end stage renal disease)    ANTONELLA (acute kidney injury)  ANTONELLA (acute kidney injury)    Encounter for palliative care  Encounter for palliative care    Functional quadriplegia  Functional quadriplegia    Septic shock  Septic shock    Acute respiratory failure with hypoxia  Acute respiratory failure with hypoxia

## 2021-01-11 NOTE — PROGRESS NOTE ADULT - SUBJECTIVE AND OBJECTIVE BOX
Patient is a 85y Male seen and evaluated at bedside.   BP elevated, acceptable, off pressor   off oxygen   ~270cc UOP/24h   no complaints, denies CP SOB fever     Meds:    albuterol/ipratropium for Nebulization 3 every 6 hours  chlorhexidine 2% Cloths 1 <User Schedule>  dextrose 40% Gel 15 once  dextrose 5%. 1000 <Continuous>  dextrose 5%. 1000 <Continuous>  dextrose 50% Injectable 25 once  dextrose 50% Injectable 12.5 once  dextrose 50% Injectable 25 once  dextrose 50% Injectable 25 once  glucagon  Injectable 1 once  insulin lispro (ADMELOG) corrective regimen sliding scale  every 6 hours  metoprolol tartrate 25 every 12 hours  polyethylene glycol 3350 17 daily  predniSONE   Tablet 10 every 24 hours  sodium chloride 3%  Inhalation 3 every 4 hours      T(C): , Max: 37.6 (01-10-21 @ 17:00)  T(F): , Max: 99.6 (01-10-21 @ 17:00)  HR: 82 (01-11-21 @ 10:00)  BP: 118/58 (01-11-21 @ 10:00)  BP(mean): 83 (01-11-21 @ 10:00)  RR: 18 (01-11-21 @ 10:00)  SpO2: 99% (01-11-21 @ 10:00)  Wt(kg): --    01-10 @ 07:01 - 01-11 @ 07:00  --------------------------------------------------------  IN: 0 mL / OUT: 855 mL / NET: -855 mL    01-11 @ 07:01  -  01-11 @ 12:35  --------------------------------------------------------  IN: 0 mL / OUT: 270 mL / NET: -270 mL          Review of Systems:  denies CP fever SOB     PHYSICAL EXAM:  GENERAL: RA, NAD, alert   CHEST/LUNG: Bilateral clear breath sounds  HEART: Regular rate and rhythm, no murmur, no gallops, no rub   ABDOMEN: Soft, nontender, non distended  EXTREMITIES: 2+ pitting b/l LE edema  ACCESS: R fem temp hemodialysis catheter         LABS:                        7.2    17.40 )-----------( 91       ( 11 Jan 2021 06:54 )             21.5     01-11    142  |  103  |  60<H>  ----------------------------<  151<H>  3.6   |  23  |  2.66<H>    Ca    7.4<L>      11 Jan 2021 06:54  Phos  4.2     01-11  Mg     1.7     01-11    TPro  6.2  /  Alb  3.0<L>  /  TBili  4.6<H>  /  DBili  x   /  AST  62<H>  /  ALT  225<H>  /  AlkPhos  131<H>  01-11      PT/INR - ( 11 Jan 2021 06:54 )   PT: 14.0 sec;   INR: 1.18          PTT - ( 11 Jan 2021 06:54 )  PTT:30.0 sec          RADIOLOGY & ADDITIONAL STUDIES:

## 2021-01-11 NOTE — PROGRESS NOTE ADULT - ASSESSMENT
85M no reported PMHx who presented initially to [facility] ED after EMS found patient down on the street hypothermic to 77F and with altered mental status. Found to have ANTONELLA and hyperK. Nephrology consulted for ANTONELLA and hyperkalemia.      Assessment/Plan:   #hyperkalemia  #oligo-anuric ANTONELLA on CKD vs ANTONELLA   #transaminitis, shock liver   #coagulopathy  #thrombocytopenia   #anemia   multi-organ failure, unclear precipitant, improving   unclear baseline creatinine   etiology of ANTONELLA likely intra-renal ischemic ATN   remains oliguric     Recommend:   s/p CVVHD, stopped 1/7   s/p intermittent hemodialysis 1/9, stopped after ~2h secondary to afib RVR episode   likely will require hemodialysis tomorrow 1/12   Hgb ~7, EPO 6k u TIW w/HD, transfusion per primary team   daily CXR and BMP   renal sono consistent with ANTONELLA   strict I/Os   renal diet     Thank you for the opportunity to participate in the care of your patient. The nephrology service remains available to assist with any questions or concerns. Please feel free to reach us by paging the on-call nephrology fellow for urgent issues or as below.     Ronnell Stout M.D.   PGY-4, Nephrology Fellow   C: [phone]   P: [phone]

## 2021-01-11 NOTE — PROGRESS NOTE ADULT - PROBLEM SELECTOR PLAN 4
#L psoas hematoma  New L psoas hematoma found on CT A/P 1/10  - IR consulted for possible embolization;  per IR, will hold off for now given pt is hemodynamically stable  - Will monitor and reconsult IR if pt becomes unable  - If pt needs to go for embolization at any time, will coordinate with renal for HD after procedure to reduce contrast induced nephropathy  - Monitor CBC as above with active T&S    #R Pelvic hematoma  Pt noted to have 7cm hematoma on CT A/P on admission.

## 2021-01-11 NOTE — PROGRESS NOTE ADULT - PROBLEM SELECTOR PLAN 10
F: tolerating PO, no IVF  E: replete K<4, Mg<2  N: puree thin liquids    VTE Prophylaxis: SCDs  GI: not needed  C: Full Code  D: 7Lach

## 2021-01-11 NOTE — PROGRESS NOTE ADULT - ASSESSMENT
86 y/o man with no reported PMHx (possibly Afib, states not on any medications) who presented initially to [facility] ED after EMS found patient down on the street hypothermic to 77F and with altered mental status. Pt admitted to MICU for further evaluation and management of hypothermia, shock, and r/o ischemic bowel/ gastrointestinal hemorrhage. Intubated 12/31, now extubated 1/7 without complications. Completed 7 day course of abx for presumed PNA. Course further complicated by new L psoas hematoma found 1/10; pending IR intervention.     NEURO  - Off sedation, pleasantly demented (at baseline)     CARDIOVASCULAR  #SHOCK  Pt found to be hypotensive to 80s/40s likely 2/2 to hemorrhagic/hypovolemic shock vs septic shock (unclear source) vs distributive (rewarming). Hemorrhagic source evidenced by Hgb drop from 8.2 to 5.9 with coffee grounds suctioned from sump placed. Pt with alerted mentation, low urine output, and cold to touch. s/p volume resuscitation with blood products as well as intermittent fluid boluses. s/p levo gtt (weaned 1/7) and 7 day zosyn course (-1/8).  RESOLVED     #Afib with RVR  - Intermittently in Afib with RVR on 1/2; noted to be in RVR to 170s on 1/9 during dialysis, spontaneously resolved  - SOSA w/ Grade I left ventricular diastolic dysfunction, Aortic sclerosis without significant stenosis, Trace aortic regurgitation, Moderate tricuspid regurgitation.  - Transition from Lopressor 2.5 q6 to Lopressor po; increased Lopressor po to 25mg BID   - Hold AC in setting of new hematoma     PULMONARY  #Respiratory Failure  Pt not hypoxic but with tachypnea, increased WOB with use of accessory muscles. Pt intubated due to concern for eventual respiratory failure and air way protection  - EXTUBATED 1/7 without complications. s/p duoneb, hypertonic saline, chest physiotherapy q4h for 2 days (1/8-1/10)   - Currently on 2L NC satting >95%  - PT/OT for deconditioned state     #PNA vs atelectasis  - Completed 7 day zosyn course (-1/8)    GASTROINTESTINAL  - Evaluated by speech and swallow 1/10: cleared for puree thin liquids    #Acute liver failure  LFTs > 7000, with coagulopathy and Utox negative. Acetaminophen level negative. Alcohol level 26. Hx of alcohol use. Liver failure possibly 2/2 to shock liver   - IMPROVING     RENAL  #Acute Renal Failure  Pt with sCr 2.93 on presentation (unknown baseline), improved s/p fluid boluses in ED. 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    - CVVHD stopped 1/7  - Intermittent hemodialysis per renal   - Continue with bladder scans     INFECTIOUS DISEASE  #LLL consolidation vs atelectasis  - Completed 7 day zosyn course (-1/8)    ENDOCRINE  - No active issues     HEME  #L psoas hematoma  New L psoas hematoma found on CT A/P 1/10  - IR consulted for possible embolization; will hold off for now given pt is hemodynamically stable  - Will monitor and reconsult IR if pt becomes unable  - If pt needs to go for embolization at any time, will coordinate with renal for HD after procedure to reduce CORAZON  - Monitor CBC as above with active T&S    #Normocytic Anemia   - Hg noted to drop to 6.6 on 1/9, given 1U pRBC with correction to 8.3   - Hg stable at 7.2 this morning   - Drop in Hg 2/2 new L psoas hematoma found on CT abd/pelvis 1/10   - Trend CBC  - Maintain active T&S    #Thrombocytopenia   Plt 70 on presentation-->20s Likely 2/2 DIC in setting of septic shock versus ITP. Per Heme/Onc, HLH was initially a consideration given markedly elevated ferritin (30k) Send CD25/IL-2, NK cell activity.  However it is unlikely with improving LDH and transaminases have markedly improved.   - Continue to monitor CBC    #Elevated INR  In the setting of acute liver failure and DIC likely from hypothermia. Repeat INR 2.39  - s/p IV vitamin K 10mg daily x 3 days per GI  - s/p 4 U FFP (01/01), 1 U Cryoprecipitate (01/01)    #R Pelvic hematoma  Pt noted to have 7cm hematoma on CT A/P on admission.    MSK  #R pubic rim fracture  Noted on CTA abd/pelvis with adjacent hematoma without active signs of bleeding.  - Ortho consulted. Currently no surgical intervention indicated at this time.      - No active issues     RHEUM  Per collateral obtained by PCP patient w/ hx of RA on prednisone 10 mg   - Continue Prednisone 10mg    FLUIDS/ELECTROLYTES/NUTRITION  -IVF: None  -Monitor, Replete to K>4 and Mg>2  -Diet: Puree thin liquids  PROPHYLAXIS  -DVT: SCDs  -GI ppx: Protonix daily  DISPO: MICU  CODE STATUS: FULL  86 y/o man with no reported PMHx (possibly Afib, states not on any medications) who presented initially to [facility] ED after EMS found patient down on the street hypothermic to 77F and with altered mental status. Pt admitted to MICU for further evaluation and management of hypothermia, shock, and r/o ischemic bowel/ gastrointestinal hemorrhage. Intubated 12/31, now extubated 1/7 without complications. Completed 7 day course of abx for presumed PNA. Course further complicated by new L psoas hematoma found 1/10; pending IR intervention.     NEURO  - Off sedation, pleasantly demented (at baseline)     CARDIOVASCULAR  #SHOCK  Pt found to be hypotensive to 80s/40s likely 2/2 to hemorrhagic/hypovolemic shock vs septic shock (unclear source) vs distributive (rewarming). Hemorrhagic source evidenced by Hgb drop from 8.2 to 5.9 with coffee grounds suctioned from sump placed. Pt with alerted mentation, low urine output, and cold to touch. s/p volume resuscitation with blood products as well as intermittent fluid boluses. s/p levo gtt (weaned 1/7) and 7 day zosyn course (-1/8).  RESOLVED     #Afib with RVR  - Intermittently in Afib with RVR on 1/2; noted to be in RVR to 170s on 1/9 during dialysis, spontaneously resolved  - SOSA w/ Grade I left ventricular diastolic dysfunction, Aortic sclerosis without significant stenosis, Trace aortic regurgitation, Moderate tricuspid regurgitation.  - Transition from Lopressor 2.5 q6 to Lopressor po; increased Lopressor po to 25mg BID   - Hold AC in setting of new hematoma     PULMONARY  #Respiratory Failure  Pt not hypoxic but with tachypnea, increased WOB with use of accessory muscles. Pt intubated due to concern for eventual respiratory failure and air way protection  - EXTUBATED 1/7 without complications. s/p duoneb, hypertonic saline, chest physiotherapy q4h for 2 days (1/8-1/10)   - Currently on 2L NC satting >95%  - PT/OT for deconditioned state     #PNA vs atelectasis  - Completed 7 day zosyn course (-1/8)    GASTROINTESTINAL  - Evaluated by speech and swallow 1/10: cleared for puree thin liquids    #Acute liver failure  LFTs > 7000, with coagulopathy and Utox negative. Acetaminophen level negative. Alcohol level 26. Hx of alcohol use. Liver failure possibly 2/2 to shock liver   - IMPROVING     RENAL  #Acute Renal Failure  Pt with sCr 2.93 on presentation (unknown baseline), improved s/p fluid boluses in ED. 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    - CVVHD stopped 1/7  - Intermittent hemodialysis per renal   - Continue with bladder scans     INFECTIOUS DISEASE  #LLL consolidation vs atelectasis  - Completed 7 day zosyn course (-1/8)    ENDOCRINE  - No active issues     HEME  #L psoas hematoma  New L psoas hematoma found on CT A/P 1/10  - IR consulted for possible embolization;  per IR, will hold off for now given pt is hemodynamically stable  - Will monitor and reconsult IR if pt becomes unable  - If pt needs to go for embolization at any time, will coordinate with renal for HD after procedure to reduce contrast induced nephropathy  - Monitor CBC as above with active T&S    #Normocytic Anemia   - Hg noted to drop to 6.6 on 1/9, given 1U pRBC with correction to 8.3   - Hg stable at 7.2 this morning   - Drop in Hg 2/2 new L psoas hematoma found on CT abd/pelvis 1/10   - Trend CBC  - Maintain active T&S    #Thrombocytopenia   Plt 70 on presentation-->20s Likely 2/2 DIC in setting of septic shock versus ITP. Per Heme/Onc, HLH was initially a consideration given markedly elevated ferritin (30k) Send CD25/IL-2, NK cell activity.  However it is unlikely with improving LDH and transaminases have markedly improved.   - Continue to monitor CBC    #Elevated INR  In the setting of acute liver failure and DIC likely from hypothermia. Repeat INR 2.39  - s/p IV vitamin K 10mg daily x 3 days per GI  - s/p 4 U FFP (01/01), 1 U Cryoprecipitate (01/01)    #R Pelvic hematoma  Pt noted to have 7cm hematoma on CT A/P on admission.    MSK  #R pubic rim fracture  Noted on CTA abd/pelvis with adjacent hematoma without active signs of bleeding.  - Ortho consulted. Currently no surgical intervention indicated at this time.      - No active issues     RHEUM  Per collateral obtained by PCP patient w/ hx of RA on prednisone 10 mg   - Continue Prednisone 10mg    FLUIDS/ELECTROLYTES/NUTRITION  -IVF: None  -Monitor, Replete to K>4 and Mg>2  -Diet: Puree thin liquids  PROPHYLAXIS  -DVT: SCDs, no chemical ppx given bleed   -GI ppx: Protonix daily  DISPO: MICU  CODE STATUS: FULL  84 y/o man with no reported PMHx (possibly Afib, states not on any medications) who presented initially to [facility] ED after EMS found patient down on the street hypothermic to 77F and with altered mental status. Pt admitted to MICU for further evaluation and management of hypothermia, shock, and r/o ischemic bowel/ gastrointestinal hemorrhage. Intubated 12/31, now extubated 1/7 without complications. Completed 7 day course of abx for presumed PNA. Course further complicated by new L psoas hematoma found 1/10; pending IR intervention. Now stable for transfer to [facility].     NEURO  - Off sedation, pleasantly demented (at baseline)     CARDIOVASCULAR  #SHOCK  Pt found to be hypotensive to 80s/40s likely 2/2 to hemorrhagic/hypovolemic shock vs septic shock (unclear source) vs distributive (rewarming). Hemorrhagic source evidenced by Hgb drop from 8.2 to 5.9 with coffee grounds suctioned from sump placed. Pt with alerted mentation, low urine output, and cold to touch. s/p volume resuscitation with blood products as well as intermittent fluid boluses. s/p levo gtt (weaned 1/7) and 7 day zosyn course (-1/8).  RESOLVED     #Afib with RVR  - Intermittently in Afib with RVR on 1/2; noted to be in RVR to 170s on 1/9 during dialysis, spontaneously resolved  - SOSA w/ Grade I left ventricular diastolic dysfunction, Aortic sclerosis without significant stenosis, Trace aortic regurgitation, Moderate tricuspid regurgitation.  - Transition from Lopressor 2.5 q6 to Lopressor po; increased Lopressor po to 25mg BID   - Hold AC in setting of new hematoma     PULMONARY  #Respiratory Failure  Pt not hypoxic but with tachypnea, increased WOB with use of accessory muscles. Pt intubated due to concern for eventual respiratory failure and air way protection  - EXTUBATED 1/7 without complications. s/p duoneb, hypertonic saline, chest physiotherapy q4h for 2 days (1/8-1/10)   - Currently on 2L NC satting >95%  - PT/OT for deconditioned state     #PNA vs atelectasis  - Completed 7 day zosyn course (-1/8)    GASTROINTESTINAL  - Evaluated by speech and swallow 1/10: cleared for puree thin liquids    #Acute liver failure  LFTs > 7000, with coagulopathy and Utox negative. Acetaminophen level negative. Alcohol level 26. Hx of alcohol use. Liver failure possibly 2/2 to shock liver   - IMPROVING     RENAL  #Acute Renal Failure  Pt with sCr 2.93 on presentation (unknown baseline), improved s/p fluid boluses in ED. 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    - CVVHD stopped 1/7  - Intermittent hemodialysis per renal   - Continue with bladder scans     INFECTIOUS DISEASE  #LLL consolidation vs atelectasis  - Completed 7 day zosyn course (-1/8)    ENDOCRINE  - No active issues     HEME  #L psoas hematoma  New L psoas hematoma found on CT A/P 1/10  - IR consulted for possible embolization;  per IR, will hold off for now given pt is hemodynamically stable  - Will monitor and reconsult IR if pt becomes unable  - If pt needs to go for embolization at any time, will coordinate with renal for HD after procedure to reduce contrast induced nephropathy  - Monitor CBC as above with active T&S    #Normocytic Anemia   - Hg noted to drop to 6.6 on 1/9, given 1U pRBC with correction to 8.3   - Hg stable at 7.2 this morning   - Drop in Hg 2/2 new L psoas hematoma found on CT abd/pelvis 1/10   - Trend CBC  - Maintain active T&S    #Thrombocytopenia   Plt 70 on presentation-->20s Likely 2/2 DIC in setting of septic shock versus ITP. Per Heme/Onc, HLH was initially a consideration given markedly elevated ferritin (30k) Send CD25/IL-2, NK cell activity.  However it is unlikely with improving LDH and transaminases have markedly improved.   - Continue to monitor CBC    #Elevated INR  In the setting of acute liver failure and DIC likely from hypothermia. Repeat INR 2.39  - s/p IV vitamin K 10mg daily x 3 days per GI  - s/p 4 U FFP (01/01), 1 U Cryoprecipitate (01/01)    #R Pelvic hematoma  Pt noted to have 7cm hematoma on CT A/P on admission.    MSK  #R pubic rim fracture  Noted on CTA abd/pelvis with adjacent hematoma without active signs of bleeding.  - Ortho consulted. Currently no surgical intervention indicated at this time.      - No active issues     RHEUM  Per collateral obtained by PCP patient w/ hx of RA on prednisone 10 mg   - Continue Prednisone 10mg    FLUIDS/ELECTROLYTES/NUTRITION  -IVF: None  -Monitor, Replete to K>4 and Mg>2  -Diet: Puree thin liquids  PROPHYLAXIS  -DVT: SCDs, no chemical ppx given bleed   -GI ppx: Protonix daily  DISPO: MICU  CODE STATUS: FULL

## 2021-01-11 NOTE — PROGRESS NOTE ADULT - PROBLEM SELECTOR PLAN 8
#Elevated INR  In the setting of acute liver failure and DIC likely from hypothermia. Repeat INR 2.39  - s/p IV vitamin K 10mg daily x 3 days per GI  - s/p 4 U FFP (01/01), 1 U Cryoprecipitate (01/01)

## 2021-01-11 NOTE — PROGRESS NOTE ADULT - PROBLEM SELECTOR PLAN 2
- Intermittently in Afib with RVR on 1/2; noted to be in RVR to 170s on 1/9 during dialysis, spontaneously resolved  - SOSA w/ Grade I left ventricular diastolic dysfunction, Aortic sclerosis without significant stenosis, Trace aortic regurgitation, Moderate tricuspid regurgitation.  - Transition from Lopressor 2.5 q6 to Lopressor po; increased Lopressor po to 25mg BID   - Hold AC in setting of new hematoma

## 2021-01-11 NOTE — PROGRESS NOTE ADULT - SUBJECTIVE AND OBJECTIVE BOX
INCOMPLETE    TRANSFER NOTE: MICU STEPDOWN TO [address] [place] [address] COURSE:       INTERVAL HPI/OVERNIGHT EVENTS:   No overnight events   Afebrile, hemodynamically stable     ICU Vital Signs Last 24 Hrs  T(C): 37.2 (11 Jan 2021 09:23), Max: 37.6 (10 Abram 2021 17:00)  T(F): 98.9 (11 Jan 2021 09:23), Max: 99.6 (10 Abram 2021 17:00)  HR: 81 (11 Jan 2021 12:00) (66 - 113)  BP: 160/67 (11 Jan 2021 12:00) (98/54 - 160/67)  BP(mean): 96 (11 Jan 2021 12:00) (72 - 109)  ABP: --  ABP(mean): --  RR: 24 (11 Jan 2021 12:00) (12 - 30)  SpO2: 98% (11 Jan 2021 12:00) (95% - 100%)    I&O's Summary    10 Abram 2021 07:01  -  11 Jan 2021 07:00  --------------------------------------------------------  IN: 0 mL / OUT: 855 mL / NET: -855 mL    11 Jan 2021 07:01  -  11 Jan 2021 12:56  --------------------------------------------------------  IN: 0 mL / OUT: 270 mL / NET: -270 mL    LABS:                        7.2    17.40 )-----------( 91       ( 11 Jan 2021 06:54 )             21.5     01-11    142  |  103  |  60<H>  ----------------------------<  151<H>  3.6   |  23  |  2.66<H>    Ca    7.4<L>      11 Jan 2021 06:54  Phos  4.2     01-11  Mg     1.7     01-11    TPro  6.2  /  Alb  3.0<L>  /  TBili  4.6<H>  /  DBili  x   /  AST  62<H>  /  ALT  225<H>  /  AlkPhos  131<H>  01-11    PT/INR - ( 11 Jan 2021 06:54 )   PT: 14.0 sec;   INR: 1.18          PTT - ( 11 Jan 2021 06:54 )  PTT:30.0 sec    CAPILLARY BLOOD GLUCOSE      POCT Blood Glucose.: 172 mg/dL (11 Jan 2021 11:33)  POCT Blood Glucose.: 100 mg/dL (11 Jan 2021 05:54)  POCT Blood Glucose.: 128 mg/dL (10 Abram 2021 23:07)  POCT Blood Glucose.: 148 mg/dL (10 Abram 2021 16:58)    RADIOLOGY & ADDITIONAL TESTS:    Consultant(s) Notes Reviewed:  [x ] YES  [ ] NO    MEDICATIONS  (STANDING):  albuterol/ipratropium for Nebulization 3 milliLiter(s) Nebulizer every 6 hours  chlorhexidine 2% Cloths 1 Application(s) Topical <User Schedule>  dextrose 40% Gel 15 Gram(s) Oral once  dextrose 5%. 1000 milliLiter(s) (50 mL/Hr) IV Continuous <Continuous>  dextrose 5%. 1000 milliLiter(s) (100 mL/Hr) IV Continuous <Continuous>  dextrose 50% Injectable 25 Gram(s) IV Push once  dextrose 50% Injectable 12.5 Gram(s) IV Push once  dextrose 50% Injectable 25 Gram(s) IV Push once  dextrose 50% Injectable 25 Gram(s) IV Push once  glucagon  Injectable 1 milliGRAM(s) IntraMuscular once  insulin lispro (ADMELOG) corrective regimen sliding scale   SubCutaneous every 6 hours  metoprolol tartrate 25 milliGRAM(s) Oral every 12 hours  polyethylene glycol 3350 17 Gram(s) Oral daily  predniSONE   Tablet 10 milliGRAM(s) Oral every 24 hours  sodium chloride 3%  Inhalation 3 milliLiter(s) Inhalation every 4 hours    MEDICATIONS  (PRN):      PHYSICAL EXAM:  GENERAL: Sedated on a ventilator  HEAD:  Atraumatic, Normocephalic  EYES: EOMI, PERRLA, conjunctiva and sclera clear  NECK: Supple, No JVD, Normal thyroid, no enlarged nodes  NERVOUS SYSTEM:  Alert & Awake.   CHEST/LUNG: B/L good air entry; No rales, rhonchi, or wheezing  HEART: S1S2 normal, no S3, Regular rate and rhythm; No murmurs  ABDOMEN: Soft, Nontender, Nondistended; Bowel sounds present  EXTREMITIES:  2+ Peripheral Pulses, No clubbing, cyanosis, or edema  LYMPH: No lymphadenopathy noted  SKIN: No rashes or lesions    Care Discussed with Consultants/Other Providers [ x] YES  [ ] NO TRANSFER NOTE: MICU STEPDOWN TO [address] [place] [address] COURSE: 86 y/o man with no reported PMHx (possibly Afib, states not on any medications) who presented initially to [facility] ED after EMS found patient down on the street hypothermic to 77F and with altered mental status. Patient transferred to [facility] for further evaluation and management of hypothermia, shock, and r/o ischemic bowel/gastrointestinal hemorrhage. Upon arrival to the Medical ICU, pt was on NC but in respiratory distress using accessory muscles and belly breathing. Pt was initially placed on BiPAP to decrease work of breathing but decision was made to intubate the patient for respiratory distress. Pt found to be hypotensive to 80s/40s likely 2/2 to hemorrhagic/hypovolemic shock vs septic shock. Hemorrhagic source evidenced by Hgb drop from 8.2 to 5.9 with coffee grounds suctioned from sump placed. GI consulted for coffee grounds in suction canister. Surgery consulted for concern for mesenteric ischemia given persistently elevated lactate and consistency of NGT output. Surgery signed off since there was no evidence of mesenteric ischemia on CTA. GI signed off given there's no active GIB on CTA. Ortho consulted for incidental finding of R inferior pubic ramus fx on CT A/P - no acute intervention per the team. Nephrology consulted for ANTONELLA and hyperkalemia; s/p CVVHD 1/1-1/7. Patient also with platelet 70 on presentation-->20s Likely 2/2 DIC in setting of septic shock versus ITP. Elevated INR 2.39 on admission in setting of acute liver failure. s/p 2 U PRBC (12/31), 4 U Plasma (01/01), 1 U Cryoprecipitate (01/01). DIC now resolved. Shock liver on admission now improving. Prolonged PT/PTT improving. Patient weaned off levo gtt 1/7. Completed 7 day zosyn course (-1/8). EXTUBATED 1/7 without complications. IV lopressor transitioned to PO for hx of afib w/ rvr. Renal following for intermittent dialysis. Mental status at baseline (pleasantly demented). Course further complicated by Hgb <7 on 1/09 requiring one unit of PRBC. Found to have a new L psoas hematoma on CT 1/10. IR consulted, however no plan for intervention from their part right now given pt is hemodynamically stable.     INTERVAL HPI/OVERNIGHT EVENTS:   Hgb stable at 7.2 overnight   Went into SVT, required IVP of Lopressor x1    ICU Vital Signs Last 24 Hrs  T(C): 37.2 (11 Jan 2021 09:23), Max: 37.6 (10 Abram 2021 17:00)  T(F): 98.9 (11 Jan 2021 09:23), Max: 99.6 (10 Abram 2021 17:00)  HR: 81 (11 Jan 2021 12:00) (66 - 113)  BP: 160/67 (11 Jan 2021 12:00) (98/54 - 160/67)  BP(mean): 96 (11 Jan 2021 12:00) (72 - 109)  ABP: --  ABP(mean): --  RR: 24 (11 Jan 2021 12:00) (12 - 30)  SpO2: 98% (11 Jan 2021 12:00) (95% - 100%)    I&O's Summary    10 Abram 2021 07:01  -  11 Jan 2021 07:00  --------------------------------------------------------  IN: 0 mL / OUT: 855 mL / NET: -855 mL    11 Jan 2021 07:01  -  11 Jan 2021 12:56  --------------------------------------------------------  IN: 0 mL / OUT: 270 mL / NET: -270 mL    LABS:                        7.2    17.40 )-----------( 91       ( 11 Jan 2021 06:54 )             21.5     01-11    142  |  103  |  60<H>  ----------------------------<  151<H>  3.6   |  23  |  2.66<H>    Ca    7.4<L>      11 Jan 2021 06:54  Phos  4.2     01-11  Mg     1.7     01-11    TPro  6.2  /  Alb  3.0<L>  /  TBili  4.6<H>  /  DBili  x   /  AST  62<H>  /  ALT  225<H>  /  AlkPhos  131<H>  01-11    PT/INR - ( 11 Jan 2021 06:54 )   PT: 14.0 sec;   INR: 1.18          PTT - ( 11 Jan 2021 06:54 )  PTT:30.0 sec    CAPILLARY BLOOD GLUCOSE      POCT Blood Glucose.: 172 mg/dL (11 Jan 2021 11:33)  POCT Blood Glucose.: 100 mg/dL (11 Jan 2021 05:54)  POCT Blood Glucose.: 128 mg/dL (10 Abram 2021 23:07)  POCT Blood Glucose.: 148 mg/dL (10 Abram 2021 16:58)    RADIOLOGY & ADDITIONAL TESTS:    Consultant(s) Notes Reviewed:  [x ] YES  [ ] NO    MEDICATIONS  (STANDING):  albuterol/ipratropium for Nebulization 3 milliLiter(s) Nebulizer every 6 hours  chlorhexidine 2% Cloths 1 Application(s) Topical <User Schedule>  dextrose 40% Gel 15 Gram(s) Oral once  dextrose 5%. 1000 milliLiter(s) (50 mL/Hr) IV Continuous <Continuous>  dextrose 5%. 1000 milliLiter(s) (100 mL/Hr) IV Continuous <Continuous>  dextrose 50% Injectable 25 Gram(s) IV Push once  dextrose 50% Injectable 12.5 Gram(s) IV Push once  dextrose 50% Injectable 25 Gram(s) IV Push once  dextrose 50% Injectable 25 Gram(s) IV Push once  glucagon  Injectable 1 milliGRAM(s) IntraMuscular once  insulin lispro (ADMELOG) corrective regimen sliding scale   SubCutaneous every 6 hours  metoprolol tartrate 25 milliGRAM(s) Oral every 12 hours  polyethylene glycol 3350 17 Gram(s) Oral daily  predniSONE   Tablet 10 milliGRAM(s) Oral every 24 hours  sodium chloride 3%  Inhalation 3 milliLiter(s) Inhalation every 4 hours    PHYSICAL EXAM:  General: Elderly, no acute distress  HEENT: NC/AT; PERRL, anicteric sclera; MMM  Neck: Supple, no JVP  Cardiovascular: +S1/S2; RRR  Respiratory: CTA bilaterally   Gastrointestinal: Soft, NT/ND; +BSx4  Extremities: WWP; no edema, clubbing or cyanosis  Vascular: 2+ radial, DP/PT pulses B/L  Neurological: Alert, oriented to self. Pleasantly demented. Generalized weakness on exam in setting of deconditioned state  TRANSFER NOTE: MICU STEPDOWN TO [address] [place] [address] COURSE: 84 y/o man with no reported PMHx (possibly Afib, states not on any medications) who presented initially to [facility] ED after EMS found patient down on the street hypothermic to 77F and with altered mental status. Patient transferred to [facility] for further evaluation and management of hypothermia, shock, and r/o ischemic bowel/gastrointestinal hemorrhage. Upon arrival to the Medical ICU, pt was on NC but in respiratory distress using accessory muscles and belly breathing. Pt was initially placed on BiPAP to decrease work of breathing but decision was made to intubate the patient for respiratory distress. Pt found to be hypotensive to 80s/40s likely 2/2 to hemorrhagic/hypovolemic shock vs septic shock. Hemorrhagic source evidenced by Hgb drop from 8.2 to 5.9 with coffee grounds suctioned from sump placed. GI consulted for coffee grounds in suction canister. Surgery consulted for concern for mesenteric ischemia given persistently elevated lactate and consistency of NGT output. Surgery signed off since there was no evidence of mesenteric ischemia on CTA. GI signed off given there's no active GIB on CTA. Ortho consulted for incidental finding of R inferior pubic ramus fx on CT A/P - no acute intervention per the team. Nephrology consulted for ANTONELLA and hyperkalemia; s/p CVVHD 1/1-1/7. Patient also with platelet 70 on presentation-->20s Likely 2/2 DIC in setting of septic shock versus ITP. Elevated INR 2.39 on admission in setting of acute liver failure. s/p 2 U PRBC (12/31), 4 U Plasma (01/01), 1 U Cryoprecipitate (01/01). DIC now resolved. Shock liver on admission now improving. Prolonged PT/PTT improving. Patient weaned off levo gtt 1/7. Completed 7 day zosyn course (-1/8). EXTUBATED 1/7 without complications. IV lopressor transitioned to PO for hx of afib w/ rvr. Renal following for intermittent dialysis. Mental status at baseline (pleasantly demented). Course further complicated by Hgb <7 on 1/09 requiring one unit of PRBC. Found to have a new L psoas hematoma on CT 1/10. IR consulted, however no plan for intervention from their part right now given pt is hemodynamically stable. Patient is stable for transfer to [facility].     INTERVAL HPI/OVERNIGHT EVENTS:   Hgb stable at 7.2 overnight   Went into SVT, required IVP of Lopressor x1    ICU Vital Signs Last 24 Hrs  T(C): 37.2 (11 Jan 2021 09:23), Max: 37.6 (10 Abram 2021 17:00)  T(F): 98.9 (11 Jan 2021 09:23), Max: 99.6 (10 Abram 2021 17:00)  HR: 81 (11 Jan 2021 12:00) (66 - 113)  BP: 160/67 (11 Jan 2021 12:00) (98/54 - 160/67)  BP(mean): 96 (11 Jan 2021 12:00) (72 - 109)  ABP: --  ABP(mean): --  RR: 24 (11 Jan 2021 12:00) (12 - 30)  SpO2: 98% (11 Jan 2021 12:00) (95% - 100%)    I&O's Summary    10 Abram 2021 07:01  -  11 Jan 2021 07:00  --------------------------------------------------------  IN: 0 mL / OUT: 855 mL / NET: -855 mL    11 Jan 2021 07:01  -  11 Jan 2021 12:56  --------------------------------------------------------  IN: 0 mL / OUT: 270 mL / NET: -270 mL    LABS:                        7.2    17.40 )-----------( 91       ( 11 Jan 2021 06:54 )             21.5     01-11    142  |  103  |  60<H>  ----------------------------<  151<H>  3.6   |  23  |  2.66<H>    Ca    7.4<L>      11 Jan 2021 06:54  Phos  4.2     01-11  Mg     1.7     01-11    TPro  6.2  /  Alb  3.0<L>  /  TBili  4.6<H>  /  DBili  x   /  AST  62<H>  /  ALT  225<H>  /  AlkPhos  131<H>  01-11    PT/INR - ( 11 Jan 2021 06:54 )   PT: 14.0 sec;   INR: 1.18          PTT - ( 11 Jan 2021 06:54 )  PTT:30.0 sec    CAPILLARY BLOOD GLUCOSE      POCT Blood Glucose.: 172 mg/dL (11 Jan 2021 11:33)  POCT Blood Glucose.: 100 mg/dL (11 Jan 2021 05:54)  POCT Blood Glucose.: 128 mg/dL (10 Abram 2021 23:07)  POCT Blood Glucose.: 148 mg/dL (10 Abram 2021 16:58)    RADIOLOGY & ADDITIONAL TESTS:    Consultant(s) Notes Reviewed:  [x ] YES  [ ] NO    MEDICATIONS  (STANDING):  albuterol/ipratropium for Nebulization 3 milliLiter(s) Nebulizer every 6 hours  chlorhexidine 2% Cloths 1 Application(s) Topical <User Schedule>  dextrose 40% Gel 15 Gram(s) Oral once  dextrose 5%. 1000 milliLiter(s) (50 mL/Hr) IV Continuous <Continuous>  dextrose 5%. 1000 milliLiter(s) (100 mL/Hr) IV Continuous <Continuous>  dextrose 50% Injectable 25 Gram(s) IV Push once  dextrose 50% Injectable 12.5 Gram(s) IV Push once  dextrose 50% Injectable 25 Gram(s) IV Push once  dextrose 50% Injectable 25 Gram(s) IV Push once  glucagon  Injectable 1 milliGRAM(s) IntraMuscular once  insulin lispro (ADMELOG) corrective regimen sliding scale   SubCutaneous every 6 hours  metoprolol tartrate 25 milliGRAM(s) Oral every 12 hours  polyethylene glycol 3350 17 Gram(s) Oral daily  predniSONE   Tablet 10 milliGRAM(s) Oral every 24 hours  sodium chloride 3%  Inhalation 3 milliLiter(s) Inhalation every 4 hours    PHYSICAL EXAM:  General: Elderly, no acute distress  HEENT: NC/AT; PERRL, anicteric sclera; MMM  Neck: Supple, no JVP  Cardiovascular: +S1/S2; RRR  Respiratory: CTA bilaterally   Gastrointestinal: Soft, NT/ND; +BSx4  Extremities: WWP; no edema, clubbing or cyanosis  Vascular: 2+ radial, DP/PT pulses B/L  Neurological: Alert, oriented to self. Pleasantly demented. Generalized weakness on exam in setting of deconditioned state

## 2021-01-11 NOTE — PROGRESS NOTE ADULT - PROBLEM SELECTOR PLAN 3
Pt with sCr 2.93 on presentation (unknown baseline), improved s/p fluid boluses in ED. 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    - CVVHD stopped 1/7  - Intermittent hemodialysis per renal   - Continue with bladder scans

## 2021-01-12 LAB
ALBUMIN SERPL ELPH-MCNC: 3.1 G/DL — LOW (ref 3.3–5)
ALP SERPL-CCNC: 143 U/L — HIGH (ref 40–120)
ALT FLD-CCNC: 195 U/L — HIGH (ref 10–45)
ANION GAP SERPL CALC-SCNC: 14 MMOL/L — SIGNIFICANT CHANGE UP (ref 5–17)
APPEARANCE UR: CLEAR — SIGNIFICANT CHANGE UP
APTT BLD: 30.9 SEC — SIGNIFICANT CHANGE UP (ref 27.5–35.5)
AST SERPL-CCNC: 54 U/L — HIGH (ref 10–40)
BACTERIA # UR AUTO: PRESENT /HPF
BASOPHILS # BLD AUTO: 0.01 K/UL — SIGNIFICANT CHANGE UP (ref 0–0.2)
BASOPHILS NFR BLD AUTO: 0.1 % — SIGNIFICANT CHANGE UP (ref 0–2)
BILIRUB SERPL-MCNC: 5.2 MG/DL — HIGH (ref 0.2–1.2)
BILIRUB UR-MCNC: NEGATIVE — SIGNIFICANT CHANGE UP
BLD GP AB SCN SERPL QL: NEGATIVE — SIGNIFICANT CHANGE UP
BUN SERPL-MCNC: 55 MG/DL — HIGH (ref 7–23)
CALCIUM SERPL-MCNC: 7.7 MG/DL — LOW (ref 8.4–10.5)
CHLORIDE SERPL-SCNC: 103 MMOL/L — SIGNIFICANT CHANGE UP (ref 96–108)
CO2 SERPL-SCNC: 25 MMOL/L — SIGNIFICANT CHANGE UP (ref 22–31)
COLOR SPEC: YELLOW — SIGNIFICANT CHANGE UP
CREAT SERPL-MCNC: 2.24 MG/DL — HIGH (ref 0.5–1.3)
DIFF PNL FLD: ABNORMAL
EOSINOPHIL # BLD AUTO: 0.01 K/UL — SIGNIFICANT CHANGE UP (ref 0–0.5)
EOSINOPHIL NFR BLD AUTO: 0.1 % — SIGNIFICANT CHANGE UP (ref 0–6)
EPI CELLS # UR: SIGNIFICANT CHANGE UP /HPF (ref 0–5)
GLUCOSE BLDC GLUCOMTR-MCNC: 108 MG/DL — HIGH (ref 70–99)
GLUCOSE BLDC GLUCOMTR-MCNC: 116 MG/DL — HIGH (ref 70–99)
GLUCOSE BLDC GLUCOMTR-MCNC: 150 MG/DL — HIGH (ref 70–99)
GLUCOSE BLDC GLUCOMTR-MCNC: 156 MG/DL — HIGH (ref 70–99)
GLUCOSE BLDC GLUCOMTR-MCNC: 157 MG/DL — HIGH (ref 70–99)
GLUCOSE BLDC GLUCOMTR-MCNC: 170 MG/DL — HIGH (ref 70–99)
GLUCOSE BLDC GLUCOMTR-MCNC: 209 MG/DL — HIGH (ref 70–99)
GLUCOSE BLDC GLUCOMTR-MCNC: 83 MG/DL — SIGNIFICANT CHANGE UP (ref 70–99)
GLUCOSE SERPL-MCNC: 144 MG/DL — HIGH (ref 70–99)
GLUCOSE UR QL: NEGATIVE — SIGNIFICANT CHANGE UP
HCT VFR BLD CALC: 27.4 % — LOW (ref 39–50)
HCT VFR BLD CALC: 27.5 % — LOW (ref 39–50)
HGB BLD-MCNC: 8.9 G/DL — LOW (ref 13–17)
HGB BLD-MCNC: 9 G/DL — LOW (ref 13–17)
IMM GRANULOCYTES NFR BLD AUTO: 1.6 % — HIGH (ref 0–1.5)
INR BLD: 1.16 — SIGNIFICANT CHANGE UP (ref 0.88–1.16)
KETONES UR-MCNC: NEGATIVE — SIGNIFICANT CHANGE UP
LEUKOCYTE ESTERASE UR-ACNC: NEGATIVE — SIGNIFICANT CHANGE UP
LYMPHOCYTES # BLD AUTO: 0.77 K/UL — LOW (ref 1–3.3)
LYMPHOCYTES # BLD AUTO: 5.6 % — LOW (ref 13–44)
MAGNESIUM SERPL-MCNC: 1.5 MG/DL — LOW (ref 1.6–2.6)
MCHC RBC-ENTMCNC: 30 PG — SIGNIFICANT CHANGE UP (ref 27–34)
MCHC RBC-ENTMCNC: 30.8 PG — SIGNIFICANT CHANGE UP (ref 27–34)
MCHC RBC-ENTMCNC: 32.5 GM/DL — SIGNIFICANT CHANGE UP (ref 32–36)
MCHC RBC-ENTMCNC: 32.7 GM/DL — SIGNIFICANT CHANGE UP (ref 32–36)
MCV RBC AUTO: 92.3 FL — SIGNIFICANT CHANGE UP (ref 80–100)
MCV RBC AUTO: 94.2 FL — SIGNIFICANT CHANGE UP (ref 80–100)
MONOCYTES # BLD AUTO: 2.57 K/UL — HIGH (ref 0–0.9)
MONOCYTES NFR BLD AUTO: 18.7 % — HIGH (ref 2–14)
NEUTROPHILS # BLD AUTO: 10.16 K/UL — HIGH (ref 1.8–7.4)
NEUTROPHILS NFR BLD AUTO: 73.9 % — SIGNIFICANT CHANGE UP (ref 43–77)
NITRITE UR-MCNC: NEGATIVE — SIGNIFICANT CHANGE UP
NRBC # BLD: 0 /100 WBCS — SIGNIFICANT CHANGE UP (ref 0–0)
NRBC # BLD: 0 /100 WBCS — SIGNIFICANT CHANGE UP (ref 0–0)
PH UR: 6 — SIGNIFICANT CHANGE UP (ref 5–8)
PHOSPHATE SERPL-MCNC: 3.4 MG/DL — SIGNIFICANT CHANGE UP (ref 2.5–4.5)
PLATELET # BLD AUTO: 101 K/UL — LOW (ref 150–400)
PLATELET # BLD AUTO: 101 K/UL — LOW (ref 150–400)
POTASSIUM SERPL-MCNC: 3.9 MMOL/L — SIGNIFICANT CHANGE UP (ref 3.5–5.3)
POTASSIUM SERPL-SCNC: 3.9 MMOL/L — SIGNIFICANT CHANGE UP (ref 3.5–5.3)
PROT SERPL-MCNC: 6.3 G/DL — SIGNIFICANT CHANGE UP (ref 6–8.3)
PROT UR-MCNC: NEGATIVE MG/DL — SIGNIFICANT CHANGE UP
PROTHROM AB SERPL-ACNC: 13.8 SEC — HIGH (ref 10.6–13.6)
RBC # BLD: 2.92 M/UL — LOW (ref 4.2–5.8)
RBC # BLD: 2.97 M/UL — LOW (ref 4.2–5.8)
RBC # FLD: 17.7 % — HIGH (ref 10.3–14.5)
RBC # FLD: 18 % — HIGH (ref 10.3–14.5)
RBC CASTS # UR COMP ASSIST: < 5 /HPF — SIGNIFICANT CHANGE UP
RH IG SCN BLD-IMP: POSITIVE — SIGNIFICANT CHANGE UP
SODIUM SERPL-SCNC: 142 MMOL/L — SIGNIFICANT CHANGE UP (ref 135–145)
SP GR SPEC: 1.02 — SIGNIFICANT CHANGE UP (ref 1–1.03)
UROBILINOGEN FLD QL: 2 E.U./DL
WBC # BLD: 13.74 K/UL — HIGH (ref 3.8–10.5)
WBC # BLD: 20.47 K/UL — HIGH (ref 3.8–10.5)
WBC # FLD AUTO: 13.74 K/UL — HIGH (ref 3.8–10.5)
WBC # FLD AUTO: 20.47 K/UL — HIGH (ref 3.8–10.5)
WBC UR QL: < 5 /HPF — SIGNIFICANT CHANGE UP

## 2021-01-12 PROCEDURE — 93971 EXTREMITY STUDY: CPT | Mod: 26,RT

## 2021-01-12 PROCEDURE — 71045 X-RAY EXAM CHEST 1 VIEW: CPT | Mod: 26

## 2021-01-12 PROCEDURE — 99232 SBSQ HOSP IP/OBS MODERATE 35: CPT

## 2021-01-12 PROCEDURE — 99233 SBSQ HOSP IP/OBS HIGH 50: CPT | Mod: GC

## 2021-01-12 PROCEDURE — 99231 SBSQ HOSP IP/OBS SF/LOW 25: CPT | Mod: GC

## 2021-01-12 RX ORDER — METOPROLOL TARTRATE 50 MG
50 TABLET ORAL
Refills: 0 | Status: DISCONTINUED | OUTPATIENT
Start: 2021-01-12 | End: 2021-01-13

## 2021-01-12 RX ORDER — ACETAMINOPHEN 500 MG
650 TABLET ORAL ONCE
Refills: 0 | Status: COMPLETED | OUTPATIENT
Start: 2021-01-12 | End: 2021-01-12

## 2021-01-12 RX ORDER — MAGNESIUM SULFATE 500 MG/ML
1 VIAL (ML) INJECTION ONCE
Refills: 0 | Status: COMPLETED | OUTPATIENT
Start: 2021-01-12 | End: 2021-01-12

## 2021-01-12 RX ADMIN — SODIUM CHLORIDE 3 MILLILITER(S): 9 INJECTION INTRAMUSCULAR; INTRAVENOUS; SUBCUTANEOUS at 16:13

## 2021-01-12 RX ADMIN — Medication 100 GRAM(S): at 10:20

## 2021-01-12 RX ADMIN — Medication 650 MILLIGRAM(S): at 10:19

## 2021-01-12 RX ADMIN — Medication 2: at 18:27

## 2021-01-12 RX ADMIN — Medication 25 MILLIGRAM(S): at 05:34

## 2021-01-12 RX ADMIN — POLYETHYLENE GLYCOL 3350 17 GRAM(S): 17 POWDER, FOR SOLUTION ORAL at 11:39

## 2021-01-12 RX ADMIN — Medication 50 MILLIGRAM(S): at 18:29

## 2021-01-12 RX ADMIN — Medication 4: at 11:53

## 2021-01-12 RX ADMIN — Medication 3 MILLILITER(S): at 23:20

## 2021-01-12 RX ADMIN — SODIUM CHLORIDE 3 MILLILITER(S): 9 INJECTION INTRAMUSCULAR; INTRAVENOUS; SUBCUTANEOUS at 21:57

## 2021-01-12 RX ADMIN — Medication 3 MILLILITER(S): at 16:13

## 2021-01-12 RX ADMIN — Medication 3 MILLILITER(S): at 03:12

## 2021-01-12 RX ADMIN — Medication 3 MILLILITER(S): at 10:19

## 2021-01-12 RX ADMIN — SODIUM CHLORIDE 3 MILLILITER(S): 9 INJECTION INTRAMUSCULAR; INTRAVENOUS; SUBCUTANEOUS at 06:21

## 2021-01-12 RX ADMIN — SODIUM CHLORIDE 3 MILLILITER(S): 9 INJECTION INTRAMUSCULAR; INTRAVENOUS; SUBCUTANEOUS at 00:34

## 2021-01-12 RX ADMIN — Medication 10 MILLIGRAM(S): at 05:34

## 2021-01-12 RX ADMIN — SODIUM CHLORIDE 3 MILLILITER(S): 9 INJECTION INTRAMUSCULAR; INTRAVENOUS; SUBCUTANEOUS at 03:13

## 2021-01-12 RX ADMIN — SODIUM CHLORIDE 3 MILLILITER(S): 9 INJECTION INTRAMUSCULAR; INTRAVENOUS; SUBCUTANEOUS at 11:39

## 2021-01-12 NOTE — PROGRESS NOTE ADULT - SUBJECTIVE AND OBJECTIVE BOX
*** INCOMPLETE ***    OVERNIGHT EVENTS: As per overnight staff, there were no acute events overnight    INTERVAL EVENTS:    SUBJECTIVE HPI: Patient seen and examined at bedside. Patient resting comfortably, no complaints at this time. Patient denies: fever, chills, weakness, dizziness, headaches, changes in vision, chest pain, palpitations, shortness of breath, cough, N/V, diarrhea or constipation, dysuria, LE edema. ROS otherwise negative.      VITAL SIGNS:  Vital Signs Last 24 Hrs  T(C): 37.1 (12 Jan 2021 06:00), Max: 37.4 (12 Jan 2021 01:01)  T(F): 98.8 (12 Jan 2021 06:00), Max: 99.3 (12 Jan 2021 01:01)  HR: 74 (12 Jan 2021 04:27) (66 - 84)  BP: 141/78 (12 Jan 2021 04:27) (98/54 - 160/67)  BP(mean): 103 (12 Jan 2021 04:27) (72 - 121)  RR: 17 (12 Jan 2021 04:27) (12 - 27)  SpO2: 95% (12 Jan 2021 04:27) (95% - 99%)      01-10-21 @ 07:01 - 01-11-21 @ 07:00  --------------------------------------------------------  IN: 0 mL / OUT: 855 mL / NET: -855 mL    01-11-21 @ 07:01 - 01-12-21 @ 06:52  --------------------------------------------------------  IN: 600 mL / OUT: 1570 mL / NET: -970 mL        PHYSICAL EXAM:  GENERAL: NAD  HEAD:  Atraumatic, Normocephalic  EYES: EOMI, PERRLA, conjunctiva and sclera clear  ENMT: Moist mucous membranes  NECK: Supple, No JVD  NERVOUS SYSTEM: Alert & Oriented X3, Good concentration  CHEST/LUNG: Clear to auscultation bilaterally; No rales, rhonchi, wheezing, or rubs  HEART: Irregular rate and rhythm; No murmurs, rubs, or gallops  ABDOMEN: Soft, Nontender, Nondistended; Bowel sounds present  EXTREMITIES:  1+ edema of the L upper thigh compared to none on the R. Trace pedal edema B/L  Vascular: 2+ peripheral pulses x4      MEDICATIONS:  MEDICATIONS  (STANDING):  albuterol/ipratropium for Nebulization 3 milliLiter(s) Nebulizer every 6 hours  chlorhexidine 2% Cloths 1 Application(s) Topical <User Schedule>  dextrose 40% Gel 15 Gram(s) Oral once  dextrose 5%. 1000 milliLiter(s) (50 mL/Hr) IV Continuous <Continuous>  dextrose 5%. 1000 milliLiter(s) (100 mL/Hr) IV Continuous <Continuous>  dextrose 50% Injectable 25 Gram(s) IV Push once  dextrose 50% Injectable 12.5 Gram(s) IV Push once  dextrose 50% Injectable 25 Gram(s) IV Push once  dextrose 50% Injectable 25 Gram(s) IV Push once  glucagon  Injectable 1 milliGRAM(s) IntraMuscular once  insulin lispro (ADMELOG) corrective regimen sliding scale   SubCutaneous every 6 hours  metoprolol tartrate 25 milliGRAM(s) Oral every 12 hours  polyethylene glycol 3350 17 Gram(s) Oral daily  predniSONE   Tablet 10 milliGRAM(s) Oral every 24 hours  sodium chloride 3%  Inhalation 3 milliLiter(s) Inhalation every 4 hours    MEDICATIONS  (PRN):      ALLERGIES/INTOLERANCES:  Allergies    No Known Allergies    Intolerances        LABS:                        6.8    12.98 )-----------( 85       ( 11 Jan 2021 13:28 )             20.3     01-11    142  |  103  |  60<H>  ----------------------------<  151<H>  3.6   |  23  |  2.66<H>    Ca    7.4<L>      11 Jan 2021 06:54  Phos  4.2     01-11  Mg     1.7     01-11    TPro  6.2  /  Alb  3.0<L>  /  TBili  4.6<H>  /  DBili  x   /  AST  62<H>  /  ALT  225<H>  /  AlkPhos  131<H>  01-11    PT/INR - ( 11 Jan 2021 06:54 )   PT: 14.0 sec;   INR: 1.18          PTT - ( 11 Jan 2021 06:54 )  PTT:30.0 sec  CAPILLARY BLOOD GLUCOSE      POCT Blood Glucose.: 150 mg/dL (12 Jan 2021 06:31)                    Microbiology:      RADIOLOGY, EKG AND ADDITIONAL TESTS: Reviewed. *** INCOMPLETE ***  HOSPITAL COURSE: 86 y/o man with no reported PMHx (possibly Afib, states not on any medications) who presented initially to [facility] ED after EMS found patient down on the street hypothermic to 77F and with altered mental status. Patient transferred to [facility] for further evaluation and management of hypothermia, shock, and r/o ischemic bowel/gastrointestinal hemorrhage. Upon arrival to the Medical ICU, pt was on NC but in respiratory distress using accessory muscles and belly breathing. Pt was initially placed on BiPAP to decrease work of breathing but decision was made to intubate the patient for respiratory distress. Pt found to be hypotensive to 80s/40s likely 2/2 to hemorrhagic/hypovolemic shock vs septic shock. Hemorrhagic source evidenced by Hgb drop from 8.2 to 5.9 with coffee grounds suctioned from sump placed. GI consulted for coffee grounds in suction canister. Surgery consulted for concern for mesenteric ischemia given persistently elevated lactate and consistency of NGT output. Surgery signed off since there was no evidence of mesenteric ischemia on CTA. GI signed off given there's no active GIB on CTA. Ortho consulted for incidental finding of R inferior pubic ramus fx on CT A/P - no acute intervention per the team. Nephrology consulted for ANTONELLA and hyperkalemia; s/p CVVHD 1/1-1/7. Patient also with platelet 70 on presentation-->20s Likely 2/2 DIC in setting of septic shock versus ITP. Elevated INR 2.39 on admission in setting of acute liver failure. s/p 2 U PRBC (12/31), 4 U Plasma (01/01), 1 U Cryoprecipitate (01/01). DIC now resolved. Shock liver on admission now improving. Prolonged PT/PTT improving. Patient weaned off levo gtt 1/7. Completed 7 day zosyn course (-1/8). EXTUBATED 1/7 without complications. IV lopressor transitioned to PO for hx of afib w/ rvr. Renal following for intermittent dialysis. Mental status at baseline (pleasantly demented). Course further complicated by Hgb <7 on 1/09 requiring one unit of PRBC. Found to have a new L psoas hematoma on CT 1/10. IR consulted, however no plan for intervention from their part right now given pt is hemodynamically stable. Patient is stable for transfer to [facility].       OVERNIGHT EVENTS: As per overnight staff, there were no acute events overnight    INTERVAL EVENTS:    SUBJECTIVE HPI: Patient seen and examined at bedside. Patient resting comfortably, no complaints at this time. Patient denies: fever, chills, weakness, dizziness, headaches, changes in vision, chest pain, palpitations, shortness of breath, cough, N/V, diarrhea or constipation, dysuria, LE edema. ROS otherwise negative.      VITAL SIGNS:  Vital Signs Last 24 Hrs  T(C): 37.1 (12 Jan 2021 06:00), Max: 37.4 (12 Jan 2021 01:01)  T(F): 98.8 (12 Jan 2021 06:00), Max: 99.3 (12 Jan 2021 01:01)  HR: 74 (12 Jan 2021 04:27) (66 - 84)  BP: 141/78 (12 Jan 2021 04:27) (98/54 - 160/67)  BP(mean): 103 (12 Jan 2021 04:27) (72 - 121)  RR: 17 (12 Jan 2021 04:27) (12 - 27)  SpO2: 95% (12 Jan 2021 04:27) (95% - 99%)      01-10-21 @ 07:01 - 01-11-21 @ 07:00  --------------------------------------------------------  IN: 0 mL / OUT: 855 mL / NET: -855 mL    01-11-21 @ 07:01  - 01-12-21 @ 06:52  --------------------------------------------------------  IN: 600 mL / OUT: 1570 mL / NET: -970 mL        PHYSICAL EXAM:  GENERAL: NAD  HEAD:  Atraumatic, Normocephalic  EYES: EOMI, PERRLA, conjunctiva and sclera clear  ENMT: Moist mucous membranes  NECK: Supple, No JVD  NERVOUS SYSTEM: Alert & Oriented X3, Good concentration  CHEST/LUNG: Clear to auscultation bilaterally; No rales, rhonchi, wheezing, or rubs  HEART: Irregular rate and rhythm; No murmurs, rubs, or gallops  ABDOMEN: Soft, Nontender, Nondistended; Bowel sounds present  EXTREMITIES:  1+ edema of the L upper thigh compared to none on the R. Trace pedal edema B/L  Vascular: 2+ peripheral pulses x4      MEDICATIONS:  MEDICATIONS  (STANDING):  albuterol/ipratropium for Nebulization 3 milliLiter(s) Nebulizer every 6 hours  chlorhexidine 2% Cloths 1 Application(s) Topical <User Schedule>  dextrose 40% Gel 15 Gram(s) Oral once  dextrose 5%. 1000 milliLiter(s) (50 mL/Hr) IV Continuous <Continuous>  dextrose 5%. 1000 milliLiter(s) (100 mL/Hr) IV Continuous <Continuous>  dextrose 50% Injectable 25 Gram(s) IV Push once  dextrose 50% Injectable 12.5 Gram(s) IV Push once  dextrose 50% Injectable 25 Gram(s) IV Push once  dextrose 50% Injectable 25 Gram(s) IV Push once  glucagon  Injectable 1 milliGRAM(s) IntraMuscular once  insulin lispro (ADMELOG) corrective regimen sliding scale   SubCutaneous every 6 hours  metoprolol tartrate 25 milliGRAM(s) Oral every 12 hours  polyethylene glycol 3350 17 Gram(s) Oral daily  predniSONE   Tablet 10 milliGRAM(s) Oral every 24 hours  sodium chloride 3%  Inhalation 3 milliLiter(s) Inhalation every 4 hours    MEDICATIONS  (PRN):      ALLERGIES/INTOLERANCES:  Allergies    No Known Allergies    Intolerances        LABS:                        6.8    12.98 )-----------( 85       ( 11 Jan 2021 13:28 )             20.3     01-11    142  |  103  |  60<H>  ----------------------------<  151<H>  3.6   |  23  |  2.66<H>    Ca    7.4<L>      11 Jan 2021 06:54  Phos  4.2     01-11  Mg     1.7     01-11    TPro  6.2  /  Alb  3.0<L>  /  TBili  4.6<H>  /  DBili  x   /  AST  62<H>  /  ALT  225<H>  /  AlkPhos  131<H>  01-11    PT/INR - ( 11 Jan 2021 06:54 )   PT: 14.0 sec;   INR: 1.18          PTT - ( 11 Jan 2021 06:54 )  PTT:30.0 sec  CAPILLARY BLOOD GLUCOSE      POCT Blood Glucose.: 150 mg/dL (12 Jan 2021 06:31)                    Microbiology:      RADIOLOGY, EKG AND ADDITIONAL TESTS: Reviewed. OVERNIGHT EVENTS: As per overnight staff, there were no acute events overnight    INTERVAL EVENTS:    SUBJECTIVE HPI: Patient seen and examined at bedside. Patient resting comfortably, no complaints at this time. Patient denies: fever, chills, weakness, dizziness, headaches, changes in vision, chest pain, palpitations, shortness of breath, cough. ROS otherwise negative.      VITAL SIGNS:  Vital Signs Last 24 Hrs  T(C): 37.1 (12 Jan 2021 06:00), Max: 37.4 (12 Jan 2021 01:01)  T(F): 98.8 (12 Jan 2021 06:00), Max: 99.3 (12 Jan 2021 01:01)  HR: 74 (12 Jan 2021 04:27) (66 - 84)  BP: 141/78 (12 Jan 2021 04:27) (98/54 - 160/67)  BP(mean): 103 (12 Jan 2021 04:27) (72 - 121)  RR: 17 (12 Jan 2021 04:27) (12 - 27)  SpO2: 95% (12 Jan 2021 04:27) (95% - 99%)      01-10-21 @ 07:01  -  01-11-21 @ 07:00  --------------------------------------------------------  IN: 0 mL / OUT: 855 mL / NET: -855 mL    01-11-21 @ 07:01  -  01-12-21 @ 06:52  --------------------------------------------------------  IN: 600 mL / OUT: 1570 mL / NET: -970 mL        PHYSICAL EXAM:  GENERAL: NAD  HEAD:  Atraumatic, Normocephalic  EYES: EOMI, PERRLA, conjunctiva and sclera clear  ENMT: Moist mucous membranes  NECK: Supple, No JVD  NERVOUS SYSTEM: Alert & Oriented X3, Good concentration  CHEST/LUNG: Clear to auscultation bilaterally; No rales, rhonchi, wheezing, or rubs  HEART: Irregular rate and rhythm; No murmurs, rubs, or gallops  ABDOMEN: Soft, Nontender, Nondistended; Bowel sounds present  EXTREMITIES:  1+ edema of the L upper thigh compared to none on the R. Trace pedal edema B/L  Vascular: 2+ peripheral pulses x4      MEDICATIONS:  MEDICATIONS  (STANDING):  albuterol/ipratropium for Nebulization 3 milliLiter(s) Nebulizer every 6 hours  chlorhexidine 2% Cloths 1 Application(s) Topical <User Schedule>  dextrose 40% Gel 15 Gram(s) Oral once  dextrose 5%. 1000 milliLiter(s) (50 mL/Hr) IV Continuous <Continuous>  dextrose 5%. 1000 milliLiter(s) (100 mL/Hr) IV Continuous <Continuous>  dextrose 50% Injectable 25 Gram(s) IV Push once  dextrose 50% Injectable 12.5 Gram(s) IV Push once  dextrose 50% Injectable 25 Gram(s) IV Push once  dextrose 50% Injectable 25 Gram(s) IV Push once  glucagon  Injectable 1 milliGRAM(s) IntraMuscular once  insulin lispro (ADMELOG) corrective regimen sliding scale   SubCutaneous every 6 hours  metoprolol tartrate 25 milliGRAM(s) Oral every 12 hours  polyethylene glycol 3350 17 Gram(s) Oral daily  predniSONE   Tablet 10 milliGRAM(s) Oral every 24 hours  sodium chloride 3%  Inhalation 3 milliLiter(s) Inhalation every 4 hours    MEDICATIONS  (PRN):      ALLERGIES/INTOLERANCES:  Allergies    No Known Allergies    Intolerances        LABS:                        6.8    12.98 )-----------( 85       ( 11 Jan 2021 13:28 )             20.3     01-11    142  |  103  |  60<H>  ----------------------------<  151<H>  3.6   |  23  |  2.66<H>    Ca    7.4<L>      11 Jan 2021 06:54  Phos  4.2     01-11  Mg     1.7     01-11    TPro  6.2  /  Alb  3.0<L>  /  TBili  4.6<H>  /  DBili  x   /  AST  62<H>  /  ALT  225<H>  /  AlkPhos  131<H>  01-11    PT/INR - ( 11 Jan 2021 06:54 )   PT: 14.0 sec;   INR: 1.18          PTT - ( 11 Jan 2021 06:54 )  PTT:30.0 sec  CAPILLARY BLOOD GLUCOSE      POCT Blood Glucose.: 150 mg/dL (12 Jan 2021 06:31)                    Microbiology:      RADIOLOGY, EKG AND ADDITIONAL TESTS: Reviewed.

## 2021-01-12 NOTE — DISCHARGE NOTE PROVIDER - NSDCCPCAREPLAN_GEN_ALL_CORE_FT
PRINCIPAL DISCHARGE DIAGNOSIS  Diagnosis: Hypothermia due to exposure  Assessment and Plan of Treatment: Hypothermia is when the body gets very cold and can't warm up on its own. Body temperature is normally around 98.6ºF (37ºC). Hypothermia is when it drops below 95ºF (35ºC). Hypothermia can happen after being in cold air or water for too long. Young children and elderly people are more likely to get hypothermia. Hypothermia can cause serious problems, or even death, if it is not treated quickly.  - You were found down with a dangerously low body temperature likely from being outside in the cold  - You were warmed and have improved      SECONDARY DISCHARGE DIAGNOSES  Diagnosis: Sepsis with acute hypoxic respiratory failure  Assessment and Plan of Treatment: Acute respiratory failure happens quickly and without much warning. It is often caused by a disease or injury that affects your breathing, such as pneumonia, opioid overdose, stroke, or a lung or spinal cord injury. Acute respiratory failure requires emergency treatment.   - You were in respiratory failure when you arrived and that is why you were intubated. You have been breathing well since you got extubated    Diagnosis: Thrombocytopenia  Assessment and Plan of Treatment: Thrombocytopenia is a condition characterized by abnormally low levels of platelets, also known as thrombocytes, in the blood. A normal human platelet count ranges from 150,000 to 450,000 platelets per microliter of blood.    Diagnosis: Rheumatoid arthritis  Assessment and Plan of Treatment:     Diagnosis: Pubic ramus fracture  Assessment and Plan of Treatment:     Diagnosis: Elevated INR  Assessment and Plan of Treatment:     Diagnosis: ANTONELLA (acute kidney injury)  Assessment and Plan of Treatment:     Diagnosis: Anemia  Assessment and Plan of Treatment:     Diagnosis: Hematoma of muscle  Assessment and Plan of Treatment:     Diagnosis: SVT (supraventricular tachycardia)  Assessment and Plan of Treatment:      PRINCIPAL DISCHARGE DIAGNOSIS  Diagnosis: Hypothermia due to exposure  Assessment and Plan of Treatment: Hypothermia is when the body gets very cold and can't warm up on its own. Body temperature is normally around 98.6ºF (37ºC). Hypothermia is when it drops below 95ºF (35ºC). Hypothermia can happen after being in cold air or water for too long. Young children and elderly people are more likely to get hypothermia. Hypothermia can cause serious problems, or even death, if it is not treated quickly.  - You were found down with a dangerously low body temperature likely from being outside in the cold  - You were warmed and have recevied treatment and have improved signifcantly from when you came into the hospital      SECONDARY DISCHARGE DIAGNOSES  Diagnosis: Sepsis with acute hypoxic respiratory failure  Assessment and Plan of Treatment: Acute respiratory failure happens quickly and without much warning. It is often caused by a disease or injury that affects your breathing, such as pneumonia, opioid overdose, stroke, or a lung or spinal cord injury. Acute respiratory failure requires emergency treatment.    - You were in respiratory failure when you arrived and that is why you were intubated. You have been breathing well since you got extubated  - You also may have had an infect in your lungs for which you were treated with antibiotics    Diagnosis: Thrombocytopenia  Assessment and Plan of Treatment: Thrombocytopenia is a condition characterized by abnormally low levels of platelets, also known as thrombocytes, in the blood. A normal human platelet count ranges from 150,000 to 450,000 platelets per microliter of blood.  - Your platelets were low when you came to the hospital but they have improved as you have received more care    Diagnosis: Rheumatoid arthritis  Assessment and Plan of Treatment: A chronic inflammatory disorder affecting many joints, including those in the hands and feet. In rheumatoid arthritis, the body's immune system attacks its own tissue, including joints. In severe cases, it attacks internal organs. Rheumatoid arthritis affects joint linings, causing painful swelling. Over long periods of time, the inflammation associated with rheumatoid arthritis can cause bone erosion and joint deformity.  While there's no cure for rheumatoid arthritis, physiotherapy and medication can help slow the disease's progression. Most cases can be managed with a class of medications called anti-rheumatic drugs (DMARDS)  - Please continue to take your home medications    Diagnosis: Pubic ramus fracture  Assessment and Plan of Treatment: Pubic rami fractures are classified as fractures of the anterior pelvic ring, usually occurring secondary to lateral compression. From a biomechanical point of view they are considered stable fractures that allow full weight bearing.    Diagnosis: Elevated INR  Assessment and Plan of Treatment: The higher your PT or INR, the longer your blood takes to clot. An elevated PT or INR means your blood is taking longer to clot than your healthcare provider believes is healthy for you. When your PT or INR is too high, you have an increased risk of bleeding.    Diagnosis: ANTONELLA (acute kidney injury)  Assessment and Plan of Treatment: A condition in which the kidneys suddenly can't filter waste from the blood. Acute renal failure develops rapidly over a few hours or days. It may be fatal. It's most common in those who are critically ill and already hospitalized. Symptoms include decreased urinary output, swelling due to fluid retention, nausea, fatigue, and shortness of breath. Sometimes symptoms may be subtle or may not appear at all.  In addition to addressing the underlying cause, treatments include fluids, medication, and dialysis.  - You received hemodialysis during your hospitalization  - Your ANTONELLA resolved prior to discharge       Diagnosis: Anemia  Assessment and Plan of Treatment: Anemia is the medical term for when a person has too few red blood cells. Red blood cells are the cells in your blood that carry oxygen. If you have too few red blood cells, your body does not get all the oxygen it needs. Most people with anemia have no symptoms. They find out they have it after their doctor does blood tests for another reason. People who do have symptoms might feel tired or weak, especially if they try to exercise or have headaches. If you experience these symptoms you should see your primary care provider for further evaluation.  - You received 2 blood transfusions during the hospitalization and your hemoglobin improved    Diagnosis: Hematoma of muscle  Assessment and Plan of Treatment: A hematoma occurs when blood leaks from a large blood vessel. The muscle hematoma can be the consequence of an impact against an external blunt or against a bone (direct trauma) or of a excessive or uncoordinated contraction (indirect trauma).   - You were found to have a hematoma in your left psoas muscle  - this hematoma has remained stable    Diagnosis: SVT (supraventricular tachycardia)  Assessment and Plan of Treatment: A faster than normal heart rate beginning above the heart's two lower chambers. Supraventricular tachycardia is a rapid heartbeat that develops when the normal electrical impulses of the heart are disrupted.  There may be symptoms like heart palpitations, or there may be no symptoms at all. Certain maneuvers, medications, an electrical shock to the heart (cardioversion), and catheter-based procedures (ablation) can help slow the heart.  - Your heart rate sometimes beats very fast  - We have started you on a medicaiton called Lopressor 50 which you will take 3 times a day to help with your heart rate

## 2021-01-12 NOTE — DISCHARGE NOTE PROVIDER - CARE PROVIDER_API CALL
Rashad Peña)  Cardiac Electrophysiology  [address], [address] [place] [address]  Phone: [phone]  Fax: [phone]  Follow Up Time:

## 2021-01-12 NOTE — PROGRESS NOTE ADULT - ASSESSMENT
85M no reported PMHx who presented initially to [facility] ED after EMS found patient down on the street hypothermic to 77F and with altered mental status. Found to have ANTONELLA and hyperK. Nephrology consulted for ANTONELLA and hyperkalemia.      Assessment/Plan:   #hyperkalemia  #oligo-anuric ANTONELLA   #transaminitis, shock liver   #coagulopathy  #thrombocytopenia   #anemia   multi-organ failure, unclear precipitant, improving   unclear baseline creatinine   etiology of ANTONELLA likely intra-renal ischemic ATN, resolving     Recommend:   s/p CVVHD, stopped 1/7   s/p intermittent hemodialysis 1/9, stopped after ~2h secondary to afib RVR episode   likely recovering off hemodialysis   remove dialysis catheter   Hgb ~9, was on EPO 6k u TIW w/HD, transfusion per primary team   daily BMP   renal sono consistent with ANTONELLA   strict I/Os   renal diet     Thank you for the opportunity to participate in the care of your patient. The nephrology service remains available to assist with any questions or concerns. Please feel free to reach us by paging the on-call nephrology fellow for urgent issues or as below.     Ronnell Stout M.D.   PGY-4, Nephrology Fellow   C: [phone]   P: [phone]

## 2021-01-12 NOTE — PROGRESS NOTE ADULT - PROBLEM SELECTOR PLAN 4
Pt with sCr 2.93 on presentation (unknown baseline), improved s/p fluid boluses in ED. 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    - CVVHD stopped 1/7  - Intermittent hemodialysis per renal   - Continue with bladder scans Pt with sCr 2.93 on presentation (unknown baseline), improved s/p fluid boluses in ED. 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    - CVVHD stopped 1/7  - Intermittent hemodialysis per renal   - Continue with bladder scans  - consult renal re: need for dialysis on 1/12

## 2021-01-12 NOTE — DISCHARGE NOTE PROVIDER - NSDCFUADDAPPT_GEN_ALL_CORE_FT
Please call Dr. Peña to schedule an appointment with him in the next two weeks. Please call [phone].

## 2021-01-12 NOTE — DISCHARGE NOTE PROVIDER - NSDCMRMEDTOKEN_GEN_ALL_CORE_FT
metoprolol tartrate 50 mg oral tablet: 1 tab(s) orally 3 times a day  predniSONE 10 mg oral tablet: 1 tab(s) orally every 24 hours

## 2021-01-12 NOTE — PROGRESS NOTE ADULT - SUBJECTIVE AND OBJECTIVE BOX
Patient is a 85y Male seen and evaluated at bedside.   BP, acceptable   off oxygen   ~1500cc UOP/24h   no complaints, denies CP SOB fever     Meds:    albuterol/ipratropium for Nebulization 3 every 6 hours  chlorhexidine 2% Cloths 1 <User Schedule>  dextrose 40% Gel 15 once  dextrose 5%. 1000 <Continuous>  dextrose 5%. 1000 <Continuous>  dextrose 50% Injectable 25 once  dextrose 50% Injectable 12.5 once  dextrose 50% Injectable 25 once  dextrose 50% Injectable 25 once  glucagon  Injectable 1 once  insulin lispro (ADMELOG) corrective regimen sliding scale  every 6 hours  metoprolol tartrate 25 every 12 hours  polyethylene glycol 3350 17 daily  predniSONE   Tablet 10 every 24 hours  sodium chloride 3%  Inhalation 3 every 4 hours      T(C): , Max: 38.2 (01-12-21 @ 09:50)  T(F): , Max: 100.7 (01-12-21 @ 09:50)  HR: 112 (01-12-21 @ 12:33)  BP: 111/60 (01-12-21 @ 12:33)  BP(mean): 81 (01-12-21 @ 12:33)  RR: 18 (01-12-21 @ 08:14)  SpO2: 95% (01-12-21 @ 12:33)  Wt(kg): --    01-11 @ 07:01  -  01-12 @ 07:00  --------------------------------------------------------  IN: 600 mL / OUT: 1570 mL / NET: -970 mL          Review of Systems:  denies CP fever SOB     PHYSICAL EXAM:  GENERAL: RA, NAD, alert   CHEST/LUNG: Bilateral clear breath sounds  HEART: Regular rate and rhythm, no murmur, no gallops, no rub   ABDOMEN: Soft, nontender, non distended  EXTREMITIES: 1+ pitting b/l LE edema        LABS:                        9.0    13.74 )-----------( 101      ( 12 Jan 2021 07:46 )             27.5     01-12    142  |  103  |  55<H>  ----------------------------<  144<H>  3.9   |  25  |  2.24<H>    Ca    7.7<L>      12 Jan 2021 07:46  Phos  3.4     01-12  Mg     1.5     01-12    TPro  6.3  /  Alb  3.1<L>  /  TBili  5.2<H>  /  DBili  x   /  AST  54<H>  /  ALT  195<H>  /  AlkPhos  143<H>  01-12      PT/INR - ( 12 Jan 2021 07:46 )   PT: 13.8 sec;   INR: 1.16          PTT - ( 12 Jan 2021 07:46 )  PTT:30.9 sec          RADIOLOGY & ADDITIONAL STUDIES:

## 2021-01-12 NOTE — DISCHARGE NOTE PROVIDER - HOSPITAL COURSE
#Discharge: do not delete    Patient is __ yo M/F with past medical history of _____  Presented with _____, found to have _____  Problem List/Main Diagnoses (system-based):   Inpatient treatment course:   New medications:   Labs to be followed outpatient:   Exam to be followed outpatient:    #Discharge: do not delete    Patient is 84 yo M with past medical history of (possibly Afib, states not on any medications)    Presented after EMS found patient down on the street hypothermic to 77F and with altered mental status, found to be hypothermic to 77F    Problem List/Main Diagnoses (system-based):   #Hematoma of muscle  New L psoas hematoma found on CT A/P 1/10. Hgb remained stable without any intervention.     #R Pelvic hematoma  Pt noted to have 7cm hematoma on CT A/P on admission. No intervention.     #right upper extremity swelling  Noted on 1/12. Dopplers ordered, negative for thrombus.    #LE  tenderness and swelling  Patient with b/l swelling worse on left than right. Also ttp in LLE. Patient has been off AC and with recurrent AVNRT. Had LE dopplers on 1/5 negative for DVT. 1/13 LE Doppler also negative for DVT.     #Anemia  hg 8.8 on 12/31 arrival. Dropped to low of 6.6 and 6.8, likely in setting of L psoas hematoma. Received 2 blood transfusions during hospital stay.     #SVT  Likely an atrial rhythm (has evidence of 2:1 flutter and possibly Atrial fibrillation on the other) His tele is significant for breakthrough Atrial fibrillation w rvr and a conversion pause. His [id number] is at least 2, hence he would be a candidate for anticoagulation. However, given patient’s lack of follow up, patient undomiciled, and at increased risk of bleeding, decision made to defer anticoagulation.   - SOSA w/ Grade I left ventricular diastolic dysfunction, Aortic sclerosis without significant stenosis, Trace aortic regurgitation, Moderate tricuspid regurgitation.  - c/w lopressor 50 TID       #ESRD (end stage renal disease) with intermittent dialysis  Pt with sCr 2.93 on presentation (unknown baseline), improved s/p fluid boluses in ED. However, trended back down to 1.68 prior to discharge. s/p HD after cath placed 1/4 in L IJ, removed on 1/12 (patient no longer need HD and spiked a fever)     #Acute respiratory failure with hypoxia  Pt not hypoxic but with tachypnea, increased WOB with use of accessory muscles. Pt intubated due to concern for eventual respiratory failure and air way protection. EXTUBATED 1/7 without complications. s/p duoneb, hypertonic saline, chest physiotherapy q4h for 2 days (1/8-1/10). Patient breathing well on room air.     #PNA vs atelectasis  Completed 7 day zosyn course on 1/8.     #Septic shock  Pt found to be hypotensive to 80s/40s likely 2/2 to hemorrhagic/hypovolemic shock vs septic shock (unclear source) vs distributive (rewarming). Hemorrhagic source evidenced by Hgb drop from 8.2 to 5.9 with coffee grounds suctioned from sump placed. Pt with alerted mentation, low urine output, and cold to touch. s/p volume resuscitation with blood products as well as intermittent fluid boluses. s/p levo gtt (weaned 1/7) and 7 day zosyn course (-1/8).    #Acute liver failure without hepatic coma  LFTs > 7000, with coagulopathy and Utox negative. Acetaminophen level negative. Alcohol level 26. Hx of alcohol use. Liver failure possibly 2/2 to shock liver. Improved prior to discharge, AST/ALST/Alk phos 34/118/115 on 1/14 discharge.      #Thrombocytopenia <resolved>  Plt 70 on presentation-->20s. Likely 2/2 DIC in setting of septic shock versus ITP. Improved and was 123 on discharge.      #Elevated INR  In setting of acute liver failure and DIC likely from hypothermia. Repeat INR 2.39. A/p IV vitamin K 10mg daily x 3 days, s/p 4 U FFP (01/01) and 1 U Cryoprecipitate (01/01). Returned to wnl on 1/12.     #RUQ tenderness  Patient complained of RUQ pain on evening of 1/12. TTP to palpation in RUQ. Two RUQ ultrasounds completed on showing dilated pancreatic duct 5mm, no evidence of cholelithiasis or acute cholecystitis, and + hepatic steatosis.    #Pubic ramus fracture  Noted on CTA abd/pelvis with adjacent hematoma without active signs of bleeding. Ortho consulted and did not recommend surgical intervention.    #rheumatoid arthritis  Per collateral obtained by PCP patient w/ hx of RA on prednisone 10 mg   - Continue Prednisone 10mg.    Inpatient treatment course:   86 y/o man with no reported PMHx (possibly Afib, states not on any medications) who presented initially to [facility] ED after EMS found patient down on the street hypothermic to 77F and with altered mental status. Pt admitted to MICU for further evaluation and management of hypothermia, shock, and r/o ischemic bowel/ gastrointestinal hemorrhage. Intubated 12/31, now extubated 1/7 without complications. Completed 7 day course of abx for presumed PNA. Course further complicated by new L psoas hematoma found 1/10; No intervention as patient’s Hgb remained stable. Patient also with episodes of SVTs, evaluated by ED team: atrial rhythm (has evidence of 2:1 flutter and possibly Atrial fibrillation on the other), tele significant for breakthrough Atrial fibrillation w rvr and a conversion pause. Deferred AC given patient’s fall and bleeding risk.     New medications:  Lopressor 50 three times a day      Labs to be followed outpatient:     Exam to be followed outpatient:     #Discharge: do not delete    Patient is 86 yo M with past medical history of (possibly Afib, states not on any medications)    Presented after EMS found patient down on the street and with altered mental status, found to be hypothermic to 77F    Problem List/Main Diagnoses (system-based):   #Hematoma of muscle  New L psoas hematoma found on CT A/P 1/10. Hgb remained stable without any intervention.     #R Pelvic hematoma  Pt noted to have 7cm hematoma on CT A/P on admission. No intervention.     #right upper extremity swelling  Noted on 1/12. Dopplers ordered, negative for thrombus.    #LE  tenderness and swelling  Patient with b/l swelling worse on left than right. Also ttp in LLE. Patient has been off AC and with recurrent AVNRT. Had LE dopplers on 1/5 negative for DVT. 1/13 LE Doppler also negative for DVT.     #Anemia  hg 8.8 on 12/31 arrival. Dropped to low of 6.6 and 6.8, likely in setting of L psoas hematoma. Received 2 blood transfusions during hospital stay.     #SVT  Likely an atrial rhythm (has evidence of 2:1 flutter and possibly Atrial fibrillation on the other) His tele is significant for breakthrough Atrial fibrillation w rvr and a conversion pause. His [id number] is at least 2, hence he would be a candidate for anticoagulation. However, given patient’s lack of follow up, patient undomiciled, and at increased risk of bleeding, decision made to defer anticoagulation.   - SOSA w/ Grade I left ventricular diastolic dysfunction, Aortic sclerosis without significant stenosis, Trace aortic regurgitation, Moderate tricuspid regurgitation.  - c/w lopressor 50 TID       #ESRD (end stage renal disease) with intermittent dialysis  Pt with sCr 2.93 on presentation (unknown baseline), improved s/p fluid boluses in ED. However, trended back down to 1.68 prior to discharge. s/p HD after cath placed 1/4 in L IJ, removed on 1/12 (patient no longer need HD and spiked a fever)     #Acute respiratory failure with hypoxia  Pt not hypoxic but with tachypnea, increased WOB with use of accessory muscles. Pt intubated due to concern for eventual respiratory failure and air way protection. EXTUBATED 1/7 without complications. s/p duoneb, hypertonic saline, chest physiotherapy q4h for 2 days (1/8-1/10). Patient breathing well on room air.     #PNA vs atelectasis  Completed 7 day zosyn course on 1/8.     #Septic shock  Pt found to be hypotensive to 80s/40s likely 2/2 to hemorrhagic/hypovolemic shock vs septic shock (unclear source) vs distributive (rewarming). Hemorrhagic source evidenced by Hgb drop from 8.2 to 5.9 with coffee grounds suctioned from sump placed. Pt with alerted mentation, low urine output, and cold to touch. s/p volume resuscitation with blood products as well as intermittent fluid boluses. s/p levo gtt (weaned 1/7) and 7 day zosyn course (-1/8).    #Acute liver failure without hepatic coma  LFTs > 7000, with coagulopathy and Utox negative. Acetaminophen level negative. Alcohol level 26. Hx of alcohol use. Liver failure possibly 2/2 to shock liver. Improved prior to discharge, AST/ALST/Alk phos 34/118/115 on 1/14 discharge.      #Thrombocytopenia <resolved>  Plt 70 on presentation-->20s. Likely 2/2 DIC in setting of septic shock versus ITP. Improved and was 123 on discharge.      #Elevated INR  In setting of acute liver failure and DIC likely from hypothermia. Repeat INR 2.39. A/p IV vitamin K 10mg daily x 3 days, s/p 4 U FFP (01/01) and 1 U Cryoprecipitate (01/01). Returned to wnl on 1/12.     #RUQ tenderness  Patient complained of RUQ pain on evening of 1/12. TTP to palpation in RUQ. Two RUQ ultrasounds completed on showing dilated pancreatic duct 5mm, no evidence of cholelithiasis or acute cholecystitis, and + hepatic steatosis.    #Pubic ramus fracture  Noted on CTA abd/pelvis with adjacent hematoma without active signs of bleeding. Ortho consulted and did not recommend surgical intervention.    #rheumatoid arthritis  Per collateral obtained by PCP patient w/ hx of RA on prednisone 10 mg   - Continue Prednisone 10mg.    Inpatient treatment course:   86 y/o man with no reported PMHx (possibly Afib, states not on any medications) who presented initially to [facility] ED after EMS found patient down on the street hypothermic to 77F and with altered mental status. Pt admitted to MICU for further evaluation and management of hypothermia, shock, and r/o ischemic bowel/ gastrointestinal hemorrhage. Intubated 12/31, now extubated 1/7 without complications. Completed 7 day course of abx for presumed PNA. Course further complicated by new L psoas hematoma found 1/10; No intervention as patient’s Hgb remained stable. Patient also with episodes of SVTs, evaluated by ED team: atrial rhythm (has evidence of 2:1 flutter and possibly Atrial fibrillation on the other), tele significant for breakthrough Atrial fibrillation w rvr and a conversion pause. Deferred AC given patient’s fall and bleeding risk.     New medications:  Lopressor 50 three times a day      Labs to be followed outpatient:     Exam to be followed outpatient:     Patient is 84 yo M with past medical history of (possibly Afib, states not on any medications)    Presented after EMS found patient down on the street and with altered mental status, found to be hypothermic to 77F    Problem List/Main Diagnoses (system-based):   #Hematoma of muscle  New L psoas hematoma found on CT A/P 1/10. Hgb remained stable without any intervention.     #R Pelvic hematoma  Pt noted to have 7cm hematoma on CT A/P on admission. No intervention.     #right upper extremity swelling  Noted on 1/12. Dopplers ordered, negative for thrombus.    #LE  tenderness and swelling  Patient with b/l swelling worse on left than right. Also ttp in LLE. Patient has been off AC and with recurrent AVNRT. Had LE dopplers on 1/5 negative for DVT. 1/13 LE Doppler also negative for DVT.     #Anemia  hg 8.8 on 12/31 arrival. Dropped to low of 6.6 and 6.8, likely in setting of L psoas hematoma. Received 2 blood transfusions during hospital stay.     #SVT  Likely an atrial rhythm (has evidence of 2:1 flutter and possibly Atrial fibrillation on the other) His tele is significant for breakthrough Atrial fibrillation w rvr and a conversion pause. His [id number] is at least 2, hence he would be a candidate for anticoagulation. However, given patient’s lack of follow up, patient undomiciled, and at increased risk of bleeding, decision made to defer anticoagulation.   - SOSA w/ Grade I left ventricular diastolic dysfunction, Aortic sclerosis without significant stenosis, Trace aortic regurgitation, Moderate tricuspid regurgitation.  - c/w lopressor 50 TID       #ESRD (end stage renal disease) with intermittent dialysis  Pt with sCr 2.93 on presentation (unknown baseline), improved s/p fluid boluses in ED. However, trended back down to 1.68 prior to discharge. s/p HD after cath placed 1/4 in L IJ, removed on 1/12 (patient no longer need HD and spiked a fever)     #Acute respiratory failure with hypoxia  Pt not hypoxic but with tachypnea, increased WOB with use of accessory muscles. Pt intubated due to concern for eventual respiratory failure and air way protection. EXTUBATED 1/7 without complications. s/p duoneb, hypertonic saline, chest physiotherapy q4h for 2 days (1/8-1/10). Patient breathing well on room air.     #PNA vs atelectasis  Completed 7 day zosyn course on 1/8.     #Septic shock  Pt found to be hypotensive to 80s/40s likely 2/2 to hemorrhagic/hypovolemic shock vs septic shock (unclear source) vs distributive (rewarming). Hemorrhagic source evidenced by Hgb drop from 8.2 to 5.9 with coffee grounds suctioned from sump placed. Pt with alerted mentation, low urine output, and cold to touch. s/p volume resuscitation with blood products as well as intermittent fluid boluses. s/p levo gtt (weaned 1/7) and 7 day zosyn course (-1/8).    #Acute liver failure without hepatic coma  LFTs > 7000, with coagulopathy and Utox negative. Acetaminophen level negative. Alcohol level 26. Hx of alcohol use. Liver failure possibly 2/2 to shock liver. Improved prior to discharge, AST/ALST/Alk phos 34/118/115 on 1/14 discharge.      #Thrombocytopenia <resolved>  Plt 70 on presentation-->20s. Likely 2/2 DIC in setting of septic shock versus ITP. Improved and was 123 on discharge.      #Elevated INR  In setting of acute liver failure and DIC likely from hypothermia. Repeat INR 2.39. A/p IV vitamin K 10mg daily x 3 days, s/p 4 U FFP (01/01) and 1 U Cryoprecipitate (01/01). Returned to wnl on 1/12.     #RUQ tenderness  Patient complained of RUQ pain on evening of 1/12. TTP to palpation in RUQ. Two RUQ ultrasounds completed on showing dilated pancreatic duct 5mm, no evidence of cholelithiasis or acute cholecystitis, and + hepatic steatosis.    #Pubic ramus fracture  Noted on CTA abd/pelvis with adjacent hematoma without active signs of bleeding. Ortho consulted and did not recommend surgical intervention.    #rheumatoid arthritis  Per collateral obtained by PCP patient w/ hx of RA on prednisone 10 mg   - Continue Prednisone 10mg.    Inpatient treatment course:   86 y/o man with no reported PMHx (possibly Afib, states not on any medications) who presented initially to [facility] ED after EMS found patient down on the street hypothermic to 77F and with altered mental status. Pt admitted to MICU for further evaluation and management of hypothermia, shock, and r/o ischemic bowel/ gastrointestinal hemorrhage. Intubated 12/31, now extubated 1/7 without complications. Completed 7 day course of abx for presumed PNA. Course further complicated by new L psoas hematoma found 1/10; No intervention as patient’s Hgb remained stable. Patient also with episodes of SVTs, evaluated by ED team: atrial rhythm (has evidence of 2:1 flutter and possibly Atrial fibrillation on the other), tele significant for breakthrough Atrial fibrillation w rvr and a conversion pause. Deferred AC given patient’s fall and bleeding risk.     New medications:  Lopressor 50 three times a day      Labs to be followed outpatient:     Exam to be followed outpatient:

## 2021-01-12 NOTE — PROGRESS NOTE ADULT - ASSESSMENT
84 y/o man with no reported PMHx (possibly Afib, states not on any medications) who presented initially to [facility] ED after EMS found patient down on the street hypothermic to 77F and with altered mental status. Pt admitted to MICU for further evaluation and management of hypothermia, shock, and r/o ischemic bowel/ gastrointestinal hemorrhage. Intubated 12/31, now extubated 1/7 without complications. Completed 7 day course of abx for presumed PNA. Course further complicated by new L psoas hematoma found 1/10; pending IR intervention.

## 2021-01-12 NOTE — PROGRESS NOTE ADULT - ASSESSMENT
86 y/o man with no reported PMHx (possibly Afib, states not on any medications) who presented initially to [facility] ED after EMS found patient down on the street hypothermic to 77F and with altered mental status. Pt admitted to MICU for further evaluation and management of hypothermia, shock.     #) DIAGNOSIS  - Grade 4 thrombocytopenia, s/p platelets 1/4, Today 100k.  - Leukocytosis, likely reactive - improving  - Normocytic anemia, stable, Hgb 8-9 (S/p transfusion)  - DIC 2/2 septic shock, resolved   - Shock liver, AHRF 2/2 aspiration pneumonia   - Possible coffee-ground residue on suction     #) PLAN  - Patient clinically much improved, now extubated and downgraded to stepdown.  - Platelets have improved   - admitted to MICU for further evaluation and management of hypothermia, shock, and r/o ischemic bowel/ gastrointestinal hemorrhage. Intubated 12/31, now extubated 1/7 without complications. Completed 7 day course of abx for presumed PNA.   - Course further complicated by new L psoas hematoma found 1/10; pending IR intervention  - continue to check CBC daily     S/E/D with Dr. Lott

## 2021-01-12 NOTE — PROGRESS NOTE ADULT - SUBJECTIVE AND OBJECTIVE BOX
Heme/Onc Progress Note (Dr. Lott )    The patient was seen and examined.        86 y/o man with no reported PMHx (possibly Afib, states not on any medications) who presented initially to [facility] ED after EMS found patient down on the street hypothermic to 77F and with altered mental status. Pt admitted to MICU for further evaluation and management of hypothermia, shock.    Interval History: Patient extubated and now on stepdown unit, labs were reviewed.     Allergies    No Known Allergies    Intolerances        Medications:  MEDICATIONS  (STANDING):  chlorhexidine 0.12% Liquid 15 milliLiter(s) Oral Mucosa every 12 hours  chlorhexidine 2% Cloths 1 Application(s) Topical <User Schedule>  CRRT Treatment    <Continuous>  dextrose 40% Gel 15 Gram(s) Oral once  dextrose 5%. 1000 milliLiter(s) (50 mL/Hr) IV Continuous <Continuous>  dextrose 5%. 1000 milliLiter(s) (100 mL/Hr) IV Continuous <Continuous>  dextrose 50% Injectable 25 Gram(s) IV Push once  dextrose 50% Injectable 12.5 Gram(s) IV Push once  dextrose 50% Injectable 25 Gram(s) IV Push once  dextrose 50% Injectable 25 Gram(s) IV Push once  folic acid Injectable 1 milliGRAM(s) IV Push daily  glucagon  Injectable 1 milliGRAM(s) IntraMuscular once  hydrocortisone sodium succinate Injectable 100 milliGRAM(s) IV Push every 8 hours  insulin lispro (ADMELOG) corrective regimen sliding scale   SubCutaneous three times a day before meals  insulin lispro (ADMELOG) corrective regimen sliding scale   SubCutaneous at bedtime  metoprolol tartrate Injectable 2.5 milliGRAM(s) IV Push every 6 hours  norepinephrine Infusion 0.05 MICROgram(s)/kG/Min (5.63 mL/Hr) IV Continuous <Continuous>  Phoxillum Filtration BK 4 / 2.5 5000 milliLiter(s) (2000 mL/Hr) CRRT <Continuous>  piperacillin/tazobactam IVPB.. 2.25 Gram(s) IV Intermittent every 6 hours  polyethylene glycol 3350 17 Gram(s) Oral daily  propofol Infusion 5 MICROgram(s)/kG/Min (1.8 mL/Hr) IV Continuous <Continuous>    MEDICATIONS  (PRN):            PHYSICAL EXAM:    Vital Signs Last 24 Hrs  T(C): 38.2 (12 Jan 2021 09:50), Max: 38.2 (12 Jan 2021 09:50)  T(F): 100.7 (12 Jan 2021 09:50), Max: 100.7 (12 Jan 2021 09:50)  HR: 80 (12 Jan 2021 08:14) (66 - 84)  BP: 139/65 (12 Jan 2021 08:14) (124/56 - 160/67)  BP(mean): 94 (12 Jan 2021 08:14) (80 - 121)  RR: 18 (12 Jan 2021 08:14) (14 - 27)  SpO2: 96% (12 Jan 2021 08:14) (95% - 99%)  Daily     Daily     GEN: No acute distress, intubated, sedated, on feeding tube  HEENT: NCAT  LUNGS: Clear to auscultation bilaterally   HEART: S1/S2 present. RRR.   ABD: Soft, non-tender, non-distended. Bowel sounds present  EXT: No pitting edema  NEURO: Sedated      Labs:    CBC Full  -  ( 12 Jan 2021 07:46 )  WBC Count : 13.74 K/uL  RBC Count : 2.92 M/uL  Hemoglobin : 9.0 g/dL  Hematocrit : 27.5 %  Platelet Count - Automated : 101 K/uL  Mean Cell Volume : 94.2 fl  Mean Cell Hemoglobin : 30.8 pg  Mean Cell Hemoglobin Concentration : 32.7 gm/dL  Auto Neutrophil # : 10.16 K/uL  Auto Lymphocyte # : 0.77 K/uL  Auto Monocyte # : 2.57 K/uL  Auto Eosinophil # : 0.01 K/uL  Auto Basophil # : 0.01 K/uL  Auto Neutrophil % : 73.9 %  Auto Lymphocyte % : 5.6 %  Auto Monocyte % : 18.7 %  Auto Eosinophil % : 0.1 %  Auto Basophil % : 0.1 %

## 2021-01-13 LAB
ALBUMIN SERPL ELPH-MCNC: 3 G/DL — LOW (ref 3.3–5)
ALP SERPL-CCNC: 136 U/L — HIGH (ref 40–120)
ALT FLD-CCNC: 147 U/L — HIGH (ref 10–45)
ANION GAP SERPL CALC-SCNC: 13 MMOL/L — SIGNIFICANT CHANGE UP (ref 5–17)
AST SERPL-CCNC: 40 U/L — SIGNIFICANT CHANGE UP (ref 10–40)
BILIRUB SERPL-MCNC: 4 MG/DL — HIGH (ref 0.2–1.2)
BUN SERPL-MCNC: 47 MG/DL — HIGH (ref 7–23)
CALCIUM SERPL-MCNC: 7.7 MG/DL — LOW (ref 8.4–10.5)
CHLORIDE SERPL-SCNC: 102 MMOL/L — SIGNIFICANT CHANGE UP (ref 96–108)
CK SERPL-CCNC: 238 U/L — HIGH (ref 30–200)
CO2 SERPL-SCNC: 23 MMOL/L — SIGNIFICANT CHANGE UP (ref 22–31)
CREAT SERPL-MCNC: 2.05 MG/DL — HIGH (ref 0.5–1.3)
GLUCOSE BLDC GLUCOMTR-MCNC: 123 MG/DL — HIGH (ref 70–99)
GLUCOSE BLDC GLUCOMTR-MCNC: 172 MG/DL — HIGH (ref 70–99)
GLUCOSE BLDC GLUCOMTR-MCNC: 177 MG/DL — HIGH (ref 70–99)
GLUCOSE BLDC GLUCOMTR-MCNC: 189 MG/DL — HIGH (ref 70–99)
GLUCOSE BLDC GLUCOMTR-MCNC: 73 MG/DL — SIGNIFICANT CHANGE UP (ref 70–99)
GLUCOSE SERPL-MCNC: 188 MG/DL — HIGH (ref 70–99)
HCT VFR BLD CALC: 28.9 % — LOW (ref 39–50)
HGB BLD-MCNC: 9.4 G/DL — LOW (ref 13–17)
MAGNESIUM SERPL-MCNC: 2 MG/DL — SIGNIFICANT CHANGE UP (ref 1.6–2.6)
MCHC RBC-ENTMCNC: 30.4 PG — SIGNIFICANT CHANGE UP (ref 27–34)
MCHC RBC-ENTMCNC: 32.5 GM/DL — SIGNIFICANT CHANGE UP (ref 32–36)
MCV RBC AUTO: 93.5 FL — SIGNIFICANT CHANGE UP (ref 80–100)
NRBC # BLD: 0 /100 WBCS — SIGNIFICANT CHANGE UP (ref 0–0)
PHOSPHATE SERPL-MCNC: 2.7 MG/DL — SIGNIFICANT CHANGE UP (ref 2.5–4.5)
PLATELET # BLD AUTO: 106 K/UL — LOW (ref 150–400)
POTASSIUM SERPL-MCNC: 4.3 MMOL/L — SIGNIFICANT CHANGE UP (ref 3.5–5.3)
POTASSIUM SERPL-SCNC: 4.3 MMOL/L — SIGNIFICANT CHANGE UP (ref 3.5–5.3)
PROT SERPL-MCNC: 6.1 G/DL — SIGNIFICANT CHANGE UP (ref 6–8.3)
RBC # BLD: 3.09 M/UL — LOW (ref 4.2–5.8)
RBC # FLD: 18.9 % — HIGH (ref 10.3–14.5)
SODIUM SERPL-SCNC: 138 MMOL/L — SIGNIFICANT CHANGE UP (ref 135–145)
T4 AB SER-ACNC: 7.74 UG/DL — SIGNIFICANT CHANGE UP (ref 3.17–11.72)
TSH SERPL-MCNC: 0.17 UIU/ML — LOW (ref 0.35–4.94)
WBC # BLD: 14.42 K/UL — HIGH (ref 3.8–10.5)
WBC # FLD AUTO: 14.42 K/UL — HIGH (ref 3.8–10.5)

## 2021-01-13 PROCEDURE — 93970 EXTREMITY STUDY: CPT | Mod: 26

## 2021-01-13 PROCEDURE — 76705 ECHO EXAM OF ABDOMEN: CPT | Mod: 26

## 2021-01-13 PROCEDURE — 99233 SBSQ HOSP IP/OBS HIGH 50: CPT | Mod: GC

## 2021-01-13 PROCEDURE — 99223 1ST HOSP IP/OBS HIGH 75: CPT

## 2021-01-13 RX ORDER — HEPARIN SODIUM 5000 [USP'U]/ML
5000 INJECTION INTRAVENOUS; SUBCUTANEOUS EVERY 8 HOURS
Refills: 0 | Status: DISCONTINUED | OUTPATIENT
Start: 2021-01-13 | End: 2021-01-15

## 2021-01-13 RX ORDER — METOPROLOL TARTRATE 50 MG
50 TABLET ORAL THREE TIMES A DAY
Refills: 0 | Status: DISCONTINUED | OUTPATIENT
Start: 2021-01-13 | End: 2021-01-15

## 2021-01-13 RX ORDER — METOPROLOL TARTRATE 50 MG
5 TABLET ORAL ONCE
Refills: 0 | Status: COMPLETED | OUTPATIENT
Start: 2021-01-13 | End: 2021-01-13

## 2021-01-13 RX ORDER — MAGNESIUM SULFATE 500 MG/ML
1 VIAL (ML) INJECTION ONCE
Refills: 0 | Status: COMPLETED | OUTPATIENT
Start: 2021-01-13 | End: 2021-01-13

## 2021-01-13 RX ORDER — IPRATROPIUM/ALBUTEROL SULFATE 18-103MCG
3 AEROSOL WITH ADAPTER (GRAM) INHALATION EVERY 6 HOURS
Refills: 0 | Status: DISCONTINUED | OUTPATIENT
Start: 2021-01-13 | End: 2021-01-15

## 2021-01-13 RX ADMIN — Medication 10 MILLIGRAM(S): at 05:59

## 2021-01-13 RX ADMIN — SODIUM CHLORIDE 3 MILLILITER(S): 9 INJECTION INTRAMUSCULAR; INTRAVENOUS; SUBCUTANEOUS at 00:44

## 2021-01-13 RX ADMIN — Medication 50 MILLIGRAM(S): at 19:38

## 2021-01-13 RX ADMIN — SODIUM CHLORIDE 3 MILLILITER(S): 9 INJECTION INTRAMUSCULAR; INTRAVENOUS; SUBCUTANEOUS at 04:10

## 2021-01-13 RX ADMIN — Medication 2: at 13:24

## 2021-01-13 RX ADMIN — Medication 3 MILLILITER(S): at 04:30

## 2021-01-13 RX ADMIN — Medication 5 MILLIGRAM(S): at 04:10

## 2021-01-13 RX ADMIN — Medication 100 GRAM(S): at 04:19

## 2021-01-13 RX ADMIN — HEPARIN SODIUM 5000 UNIT(S): 5000 INJECTION INTRAVENOUS; SUBCUTANEOUS at 22:28

## 2021-01-13 RX ADMIN — Medication 50 MILLIGRAM(S): at 05:59

## 2021-01-13 RX ADMIN — HEPARIN SODIUM 5000 UNIT(S): 5000 INJECTION INTRAVENOUS; SUBCUTANEOUS at 13:24

## 2021-01-13 RX ADMIN — Medication 2: at 07:21

## 2021-01-13 NOTE — PROGRESS NOTE ADULT - ASSESSMENT
86 y/o man with no reported PMHx (possibly Afib, states not on any medications) who presented initially to [facility] ED after EMS found patient down on the street hypothermic to 77F and with altered mental status. Pt admitted to MICU for further evaluation and management of hypothermia, shock, and r/o ischemic bowel/ gastrointestinal hemorrhage. Intubated 12/31, now extubated 1/7 without complications. Completed 7 day course of abx for presumed PNA. Course further complicated by new L psoas hematoma found 1/10; pending IR intervention.

## 2021-01-13 NOTE — PROGRESS NOTE ADULT - PROBLEM SELECTOR PLAN 3
- Intermittently in Afib with RVR on 1/2; noted to be in RVR to 170s on 1/9 during dialysis, spontaneously resolved  - SOSA w/ Grade I left ventricular diastolic dysfunction, Aortic sclerosis without significant stenosis, Trace aortic regurgitation, Moderate tricuspid regurgitation.  - Transition from Lopressor 2.5 q6 to Lopressor po; increased Lopressor po to 25mg BID; -> 1/2 increased to  Lopressor 50 mg BID  - Hold AC in setting of new hematoma

## 2021-01-13 NOTE — PROGRESS NOTE ADULT - PROBLEM SELECTOR PLAN 8
#Elevated INR  In the setting of acute liver failure and DIC likely from hypothermia. Repeat INR 2.39  - s/p IV vitamin K 10mg daily x 3 days per GI  - s/p 4 U FFP (01/01), 1 U Cryoprecipitate (01/01) #Elevated INR  In the setting of acute liver failure and DIC likely from hypothermia. Repeat INR 2.39  - s/p IV vitamin K 10mg daily x 3 days per GI  - s/p 4 U FFP (01/01), 1 U Cryoprecipitate (01/01)    #RUQ tenderness  Patient complained of RUQ pain on evening of 1/12. TTP to palpation in RUQ. Previous u/s completed on 1/1 showing dilated pancreatic duct 5mm. LFTs improving.   - f/u RUQ u/s  - consider MRI (per radiology, patient able to do scan with contrast despite renal fxn)

## 2021-01-13 NOTE — PROGRESS NOTE ADULT - PROBLEM SELECTOR PLAN 1
#L psoas hematoma  New L psoas hematoma found on CT A/P 1/10  - IR consulted for possible embolization;  per IR, will hold off for now given pt is hemodynamically stable  - Will monitor and reconsult IR if pt becomes unable  - If pt needs to go for embolization at any time, will coordinate with renal for HD after procedure to reduce contrast induced nephropathy  - Monitor CBC as above with active T&S    #R Pelvic hematoma  Pt noted to have 7cm hematoma on CT A/P on admission. #L psoas hematoma  New L psoas hematoma found on CT A/P 1/10  - IR consulted for possible embolization;  per IR, will hold off for now given pt is hemodynamically stable  - Will monitor and reconsult IR if pt becomes unable  - If pt needs to go for embolization at any time, will coordinate with renal for HD after procedure to reduce contrast induced nephropathy  - Monitor CBC as above with active T&S    #R Pelvic hematoma  Pt noted to have 7cm hematoma on CT A/P on admission.    #right upper extremity swelling  Noted on 1/12. Dopplers ordered, negative for thrombus.  - continue to monitor #L psoas hematoma  New L psoas hematoma found on CT A/P 1/10  - IR consulted for possible embolization;  per IR, will hold off for now given pt is hemodynamically stable  - Will monitor and reconsult IR if pt becomes unable  - If pt needs to go for embolization at any time, will coordinate with renal for HD after procedure to reduce contrast induced nephropathy  - Monitor CBC as above with active T&S    #R Pelvic hematoma  Pt noted to have 7cm hematoma on CT A/P on admission.    #right upper extremity swelling  Noted on 1/12. Dopplers ordered, negative for thrombus.  - continue to monitor    #LE  tenderness and swelling  Patient with b/l swelling worse on left than right. Also ttp in LLE. Patient has been off AC and with recurrent AVNRT. Had LE dopplers on 1/5 negative for DVT.   - f/u LE 1/3 doppler #L psoas hematoma  New L psoas hematoma found on CT A/P 1/10  - IR consulted for possible embolization;  per IR, will hold off for now given pt is hemodynamically stable  - Will monitor and reconsult IR if pt becomes unable  - If pt needs to go for embolization at any time, will coordinate with renal for HD after procedure to reduce contrast induced nephropathy  - Monitor CBC as above with active T&S    #R Pelvic hematoma  Pt noted to have 7cm hematoma on CT A/P on admission.    #right upper extremity swelling  Noted on 1/12. Dopplers ordered, negative for thrombus.  - continue to monitor    #LE  tenderness and swelling  Patient with b/l swelling worse on left than right. Also ttp in LLE. Patient has been off AC and with recurrent AVNRT. Had LE dopplers on 1/5 negative for DVT.   - f/u LE 1/13 doppler

## 2021-01-13 NOTE — CHART NOTE - NSCHARTNOTEFT_GEN_A_CORE
Admitting Diagnosis:   Patient is a 85y old  Male who presents with a chief complaint of AMS, hypothermic, (13 Jan 2021 13:13)      PAST MEDICAL & SURGICAL HISTORY:  Medical history unknown    No significant past surgical history        Current Nutrition Order:  Puree/Thin Liquids, DASH     PO Intake: Good (%) [   ]  Fair (50-75%) [   ] Poor (<25%) [ X  ]    GI Issues: No N/V/C/D reported at this time. BM 1/12    Pain: He endorsed pain in his ankle- no wounds noted    Skin Integrity: Mason 13; B/L hands mild edema  Sacrum stage II pressure injury     Labs:   01-13    138  |  102  |  47<H>  ----------------------------<  188<H>  4.3   |  23  |  2.05<H>    Ca    7.7<L>      13 Jan 2021 06:34  Phos  2.7     01-13  Mg     2.0     01-13    TPro  6.1  /  Alb  3.0<L>  /  TBili  4.0<H>  /  DBili  x   /  AST  40  /  ALT  147<H>  /  AlkPhos  136<H>  01-13    CAPILLARY BLOOD GLUCOSE      POCT Blood Glucose.: 177 mg/dL (13 Jan 2021 13:14)  POCT Blood Glucose.: 172 mg/dL (13 Jan 2021 11:40)  POCT Blood Glucose.: 189 mg/dL (13 Jan 2021 06:19)  POCT Blood Glucose.: 116 mg/dL (12 Jan 2021 23:58)  POCT Blood Glucose.: 156 mg/dL (12 Jan 2021 18:19)  POCT Blood Glucose.: 157 mg/dL (12 Jan 2021 18:04)      Medications:  MEDICATIONS  (STANDING):  chlorhexidine 2% Cloths 1 Application(s) Topical <User Schedule>  dextrose 40% Gel 15 Gram(s) Oral once  dextrose 5%. 1000 milliLiter(s) (50 mL/Hr) IV Continuous <Continuous>  dextrose 5%. 1000 milliLiter(s) (100 mL/Hr) IV Continuous <Continuous>  dextrose 50% Injectable 25 Gram(s) IV Push once  dextrose 50% Injectable 12.5 Gram(s) IV Push once  dextrose 50% Injectable 25 Gram(s) IV Push once  dextrose 50% Injectable 25 Gram(s) IV Push once  glucagon  Injectable 1 milliGRAM(s) IntraMuscular once  heparin   Injectable 5000 Unit(s) SubCutaneous every 8 hours  insulin lispro (ADMELOG) corrective regimen sliding scale   SubCutaneous every 6 hours  metoprolol tartrate 50 milliGRAM(s) Oral three times a day  predniSONE   Tablet 10 milliGRAM(s) Oral every 24 hours    MEDICATIONS  (PRN):  albuterol/ipratropium for Nebulization 3 milliLiter(s) Nebulizer every 6 hours PRN Shortness of Breath and/or Wheezing      Weight:  Daily Height in cm: 162.56 (13 Jan 2021 08:52)    Height: 5'7" Weight: 60kg IBW:67.1kg+/-10%, %IBW: 89%, BMI:20.6kg/m2    Weight Change: 74.7kg (1/8, bedscale)- unsure accuracy of this wt vs. admit weight of 60kg  Will continue to trend    Estimated energy needs:   ABW(60kg) used to calculate energy needs due to pt's current body weight <100% IBW (89%). Nutrient needs based on [facility] standards of care for maintenance in adults, adjusted for age, renal failure. Fluids per team.  Calories: 1677 - 2013 kcal (25-30kcal/kg)  Protein: 72-84g protein (1.2-1.4 g/kg)    Subjective:   84y/o male with unknown history(suspected ETOH abuse) presented to ED after being found on street with hypothermia 77F, altered mentation and coffee ground emesis. Pt admitted to MICU for further evaluation and management of hypothermia, shock, and r/o ischemic bowel/ gastrointestinal hemorrhage. Pt intubated for airway protection. Pt is s/p HD catheter placement and initiation of CVVHD on 1/4. He was transitioned to IHD on 1/7. Successfully taken off of sedation and extubated on 1/7. HD on 1/9 cut short d/t Afib w/RVR. HD cath removed d/t c/f infection and improved ANTONELLA. Seen by SLP on 1/10 and recommended for puree/thin liquid, primarily as pt is edentulous. No overt s/s aspiration appreciated. On 1/10 he was found to have a L. psoas hematoma, though no intervention by IR at this time. Ordered for RUQ U/S d/t elevated LFTs/T. bili, and jaundice. Previous U/S +dilated pancreatic duct.     Pt seen in rom this AM, awake, alert, pleasant. Observed breakfast tray fairly untouched, pt in poor positioning for eating (kyphotic, slouched down in bed). Pt denied being too hungry at time of visit. Per RN, he is able to feed himself. Pt denied N/V/C/D or pain. No food preferences provided, pt stated that he would eat whatever is there. Afebrile this AM. WBC trending down, lytes WNL, BUN/Cr trending down. Will continue to follow per RD protocol.       Previous Nutrition Diagnosis:  Inadequate energy intake RT current NPO status AEB 0% of EER able to be met at this time   Active [  ]  Resolved [ X  ]    If resolved, new PES:     Goal/Expected Outcome Consistently meet >75% est needs via most appropriate route with good tolerance    Recommendations:  1. Recommend addition of Ensure High Protein BID (320kcal, 32g pro) and multivitamin   2. F/u with SLP recommendations for diet texture advancement  3. Monitor lytes and replete prn. POC BG q6hrs   4. Pain and bowel regimen per team.  5. Provide assistance at meals and maintain aspiration precautions at all times   *d/w MD    Education: Encouraged PO intake, though did not provide formal diet ed 2/2 episodes of confusion     Risk Level: High [X  ] Moderate [   ] Low [   ]

## 2021-01-13 NOTE — CONSULT NOTE ADULT - REASON FOR ADMISSION
AMS, hypothermic,

## 2021-01-13 NOTE — PROGRESS NOTE ADULT - SUBJECTIVE AND OBJECTIVE BOX
*** INCOMPLETE ***    OVERNIGHT EVENTS: RUQ US ordered for cholngastic LFTs w/t.bili, jaundis on exam and RUQ mild ab pain, can consider MRI as previous US with dilated pancriatic duct 5mm. around 4AM SVT w/RVR to 160s, vagal maneuvers, s/p 5mg lopressor broke back to sinus after 5min. Duoneb to PRN, stoped hypertonic seline.      INTERVAL EVENTS: : Pt had 100.7 fever, Bcx obtained, UA negative, CXR with no change as of 7 days ago. Pt had one episode of diarrhea, d/c'd mirilax. Removed HD cath. 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 Lopressor increased to 50mg BID.  o/n: RUQ US ordered for cholngastic LFTs w/t.bili, jaundis on exam and RUQ mild ab pain, can consider MRI as previous US with dilated pancriatic duct 5mm. around 4AM SVT w/RVR to 160s, vagal maneuvers, s/p 5mg lopressor broke back to sinus after 5min. Duoneb to PRN, stoped hypertonic seline.      SUBJECTIVE HPI: Patient seen and examined at bedside. Patient resting comfortably, no complaints at this time. Patient denies: fever, chills, weakness, dizziness, headaches, changes in vision, chest pain, palpitations, shortness of breath, cough, N/V, diarrhea or constipation, dysuria, LE edema. ROS otherwise negative.      VITAL SIGNS:  Vital Signs Last 24 Hrs  T(C): 37.2 (2021 05:00), Max: 38.2 (2021 09:50)  T(F): 98.9 (2021 05:00), Max: 100.7 (2021 09:50)  HR: 92 (2021 01:30) (80 - 112)  BP: 138/66 (2021 01:30) (105/57 - 139/65)  BP(mean): 94 (2021 01:30) (73 - 94)  RR: 20 (2021 01:30) (16 - 20)  SpO2: 100% (2021 01:30) (89% - 100%)      21 @ 07:01  -  21 @ 07:00  --------------------------------------------------------  IN: 600 mL / OUT: 1570 mL / NET: -970 mL    21 @ 07:01  21 @ 06:50  --------------------------------------------------------  IN: 850 mL / OUT: 1100 mL / NET: -250 mL        PHYSICAL EXAM:  GENERAL: NAD  HEAD:  Atraumatic, Normocephalic  EYES: EOMI, PERRLA, conjunctiva and sclera clear  ENMT: Moist mucous membranes  NECK: Supple, No JVD  NERVOUS SYSTEM: Alert & Oriented X3, Good concentration  CHEST/LUNG: Clear to auscultation bilaterally; No rales, rhonchi, wheezing, or rubs  HEART: Irregular rate and rhythm; No murmurs, rubs, or gallops  ABDOMEN: Soft, Nontender, Nondistended; Bowel sounds present  EXTREMITIES:  1+ edema of the L upper thigh compared to none on the R. Trace pedal edema B/L  Vascular: 2+ peripheral pulses x4      MEDICATIONS:  MEDICATIONS  (STANDING):  chlorhexidine 2% Cloths 1 Application(s) Topical <User Schedule>  dextrose 40% Gel 15 Gram(s) Oral once  dextrose 5%. 1000 milliLiter(s) (50 mL/Hr) IV Continuous <Continuous>  dextrose 5%. 1000 milliLiter(s) (100 mL/Hr) IV Continuous <Continuous>  dextrose 50% Injectable 25 Gram(s) IV Push once  dextrose 50% Injectable 12.5 Gram(s) IV Push once  dextrose 50% Injectable 25 Gram(s) IV Push once  dextrose 50% Injectable 25 Gram(s) IV Push once  glucagon  Injectable 1 milliGRAM(s) IntraMuscular once  insulin lispro (ADMELOG) corrective regimen sliding scale   SubCutaneous every 6 hours  metoprolol tartrate 50 milliGRAM(s) Oral two times a day  predniSONE   Tablet 10 milliGRAM(s) Oral every 24 hours    MEDICATIONS  (PRN):  albuterol/ipratropium for Nebulization 3 milliLiter(s) Nebulizer every 6 hours PRN Shortness of Breath and/or Wheezing      ALLERGIES/INTOLERANCES:  Allergies    No Known Allergies    Intolerances        LABS:                        8.9    20.47 )-----------( 101      ( 2021 15:39 )             27.4         142  |  103  |  55<H>  ----------------------------<  144<H>  3.9   |  25  |  2.24<H>    Ca    7.7<L>      2021 07:46  Phos  3.4       Mg     1.5         TPro  6.3  /  Alb  3.1<L>  /  TBili  5.2<H>  /  DBili  x   /  AST  54<H>  /  ALT  195<H>  /  AlkPhos  143<H>      PT/INR - ( 2021 07:46 )   PT: 13.8 sec;   INR: 1.16          PTT - ( 2021 07:46 )  PTT:30.9 sec  CAPILLARY BLOOD GLUCOSE      POCT Blood Glucose.: 189 mg/dL (2021 06:19)            Urinalysis Basic - ( 2021 14:03 )    Color: Yellow / Appearance: Clear / S.020 / pH: x  Gluc: x / Ketone: NEGATIVE  / Bili: Negative / Urobili: 2.0 E.U./dL   Blood: x / Protein: NEGATIVE mg/dL / Nitrite: NEGATIVE   Leuk Esterase: NEGATIVE / RBC: < 5 /HPF / WBC < 5 /HPF   Sq Epi: x / Non Sq Epi: 0-5 /HPF / Bacteria: Present /HPF            Microbiology:    Culture - Blood (collected 2021 11:54)  Source: .Blood Blood-Peripheral  Preliminary Report (2021 00:00):    No growth at 12 hours    Culture - Blood (collected 2021 11:54)  Source: .Blood Blood-Peripheral  Preliminary Report (2021 00:00):    No growth at 12 hours        RADIOLOGY, EKG AND ADDITIONAL TESTS: Reviewed. OVERNIGHT EVENTS: RUQ US ordered for cholngastic LFTs w/t.bili, jaundis on exam and RUQ mild ab pain, can consider MRI as previous US with dilated pancriatic duct 5mm. around 4AM SVT w/RVR to 160s, vagal maneuvers, s/p 5mg lopressor broke back to sinus after 5min. Duoneb to PRN, stoped hypertonic seline.      INTERVAL EVENTS: : Pt had 100.7 fever, Bcx obtained, UA negative, CXR with no change as of 7 days ago. Pt had one episode of diarrhea, d/c'd mirilax. Removed HD cath. 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 Lopressor increased to 50mg BID.  o/n: RUQ US ordered for cholngastic LFTs w/t.bili, jaundis on exam and RUQ mild ab pain, can consider MRI as previous US with dilated pancriatic duct 5mm. around 4AM SVT w/RVR to 160s, vagal maneuvers, s/p 5mg lopressor broke back to sinus after 5min. Duoneb to PRN, stoped hypertonic seline.      SUBJECTIVE HPI: Patient seen and examined at bedside. Patient resting comfortably, no complaints at this time. Patient denies: fever, chills, weakness, dizziness, headaches, changes in vision, chest pain, palpitations, shortness of breath, cough, N/V, diarrhea or constipation, dysuria, LE edema. ROS otherwise negative.      VITAL SIGNS:  Vital Signs Last 24 Hrs  T(C): 37.2 (2021 05:00), Max: 38.2 (2021 09:50)  T(F): 98.9 (2021 05:00), Max: 100.7 (2021 09:50)  HR: 92 (2021 01:30) (80 - 112)  BP: 138/66 (2021 01:30) (105/57 - 139/65)  BP(mean): 94 (2021 01:30) (73 - 94)  RR: 20 (2021 01:30) (16 - 20)  SpO2: 100% (2021 01:30) (89% - 100%)      21 @ 07:01  -  21 @ 07:00  --------------------------------------------------------  IN: 600 mL / OUT: 1570 mL / NET: -970 mL    21 @ 07:21 @ 06:50  --------------------------------------------------------  IN: 850 mL / OUT: 1100 mL / NET: -250 mL        PHYSICAL EXAM:  GENERAL: NAD  HEAD:  Atraumatic, Normocephalic  EYES: EOMI, PERRLA, conjunctiva and sclera clear  ENMT: Moist mucous membranes  NECK: Supple, No JVD  NERVOUS SYSTEM: Alert & Oriented X3, Good concentration  CHEST/LUNG: Clear to auscultation bilaterally; No rales, rhonchi, wheezing, or rubs  HEART: Irregular rate and rhythm; No murmurs, rubs, or gallops  ABDOMEN: Soft, Nontender, Nondistended; Bowel sounds present  EXTREMITIES:  1+ edema of the L upper thigh (worse on the L). Trace pedal edema B/L; swelling on RUE   Vascular: 2+ peripheral pulses x4      MEDICATIONS:  MEDICATIONS  (STANDING):  chlorhexidine 2% Cloths 1 Application(s) Topical <User Schedule>  dextrose 40% Gel 15 Gram(s) Oral once  dextrose 5%. 1000 milliLiter(s) (50 mL/Hr) IV Continuous <Continuous>  dextrose 5%. 1000 milliLiter(s) (100 mL/Hr) IV Continuous <Continuous>  dextrose 50% Injectable 25 Gram(s) IV Push once  dextrose 50% Injectable 12.5 Gram(s) IV Push once  dextrose 50% Injectable 25 Gram(s) IV Push once  dextrose 50% Injectable 25 Gram(s) IV Push once  glucagon  Injectable 1 milliGRAM(s) IntraMuscular once  insulin lispro (ADMELOG) corrective regimen sliding scale   SubCutaneous every 6 hours  metoprolol tartrate 50 milliGRAM(s) Oral two times a day  predniSONE   Tablet 10 milliGRAM(s) Oral every 24 hours    MEDICATIONS  (PRN):  albuterol/ipratropium for Nebulization 3 milliLiter(s) Nebulizer every 6 hours PRN Shortness of Breath and/or Wheezing      ALLERGIES/INTOLERANCES:  Allergies    No Known Allergies    Intolerances        LABS:                        8.9    20.47 )-----------( 101      ( 2021 15:39 )             27.4         142  |  103  |  55<H>  ----------------------------<  144<H>  3.9   |  25  |  2.24<H>    Ca    7.7<L>      2021 07:46  Phos  3.4       Mg     1.5         TPro  6.3  /  Alb  3.1<L>  /  TBili  5.2<H>  /  DBili  x   /  AST  54<H>  /  ALT  195<H>  /  AlkPhos  143<H>      PT/INR - ( 2021 07:46 )   PT: 13.8 sec;   INR: 1.16          PTT - ( 2021 07:46 )  PTT:30.9 sec  CAPILLARY BLOOD GLUCOSE      POCT Blood Glucose.: 189 mg/dL (2021 06:19)            Urinalysis Basic - ( 2021 14:03 )    Color: Yellow / Appearance: Clear / S.020 / pH: x  Gluc: x / Ketone: NEGATIVE  / Bili: Negative / Urobili: 2.0 E.U./dL   Blood: x / Protein: NEGATIVE mg/dL / Nitrite: NEGATIVE   Leuk Esterase: NEGATIVE / RBC: < 5 /HPF / WBC < 5 /HPF   Sq Epi: x / Non Sq Epi: 0-5 /HPF / Bacteria: Present /HPF            Microbiology:    Culture - Blood (collected 2021 11:54)  Source: .Blood Blood-Peripheral  Preliminary Report (2021 00:00):    No growth at 12 hours    Culture - Blood (collected 2021 11:54)  Source: .Blood Blood-Peripheral  Preliminary Report (2021 00:00):    No growth at 12 hours        RADIOLOGY, EKG AND ADDITIONAL TESTS: Reviewed. OVERNIGHT EVENTS: RUQ US ordered for cholngastic LFTs w/t.bili, jaundis on exam and RUQ mild ab pain, can consider MRI as previous US with dilated pancriatic duct 5mm. around 4AM SVT w/RVR to 160s, vagal maneuvers, s/p 5mg lopressor broke back to sinus after 5min. Duoneb to PRN, stoped hypertonic seline.      INTERVAL EVENTS: : Pt had 100.7 fever, Bcx obtained, UA negative, CXR with no change as of 7 days ago. Pt had one episode of diarrhea, d/c'd mirilax. Removed HD cath. 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 Lopressor increased to 50mg BID.  o/n: RUQ US ordered for cholngastic LFTs w/t.bili, jaundis on exam and RUQ mild ab pain, can consider MRI as previous US with dilated pancriatic duct 5mm. around 4AM SVT w/RVR to 160s, vagal maneuvers, s/p 5mg lopressor broke back to sinus after 5min. Duoneb to PRN, stoped hypertonic seline.      SUBJECTIVE HPI: Patient seen and examined at bedside. Patient resting comfortably, no complaints at this time. Patient denies: fever, chills, weakness, dizziness, headaches, changes in vision, chest pain, palpitations, SOB. ROS otherwise negative.      VITAL SIGNS:  Vital Signs Last 24 Hrs  T(C): 37.2 (2021 05:00), Max: 38.2 (2021 09:50)  T(F): 98.9 (2021 05:00), Max: 100.7 (2021 09:50)  HR: 92 (2021 01:30) (80 - 112)  BP: 138/66 (2021 01:30) (105/57 - 139/65)  BP(mean): 94 (2021 01:30) (73 - 94)  RR: 20 (2021 01:30) (16 - 20)  SpO2: 100% (2021 01:30) (89% - 100%)      21 @ 07:01  -  01-12-21 @ 07:00  --------------------------------------------------------  IN: 600 mL / OUT: 1570 mL / NET: -970 mL    21 @ 07:01  -  21 @ 06:50  --------------------------------------------------------  IN: 850 mL / OUT: 1100 mL / NET: -250 mL        PHYSICAL EXAM:  GENERAL: NAD  HEAD:  Atraumatic, Normocephalic  EYES: EOMI, PERRLA, conjunctiva and sclera clear  ENMT: Moist mucous membranes  NECK: Supple, No JVD  NERVOUS SYSTEM: Alert & Oriented X3, Good concentration  CHEST/LUNG: Clear to auscultation bilaterally; No rales, rhonchi, wheezing, or rubs  HEART: Irregular rate and rhythm; No murmurs, rubs, or gallops  ABDOMEN: Soft, Nontender, Nondistended; Bowel sounds present  EXTREMITIES:  1+ edema of the L upper thigh (worse on the L). Trace pedal edema B/L; swelling on RUE   Vascular: 2+ peripheral pulses x4      MEDICATIONS:  MEDICATIONS  (STANDING):  chlorhexidine 2% Cloths 1 Application(s) Topical <User Schedule>  dextrose 40% Gel 15 Gram(s) Oral once  dextrose 5%. 1000 milliLiter(s) (50 mL/Hr) IV Continuous <Continuous>  dextrose 5%. 1000 milliLiter(s) (100 mL/Hr) IV Continuous <Continuous>  dextrose 50% Injectable 25 Gram(s) IV Push once  dextrose 50% Injectable 12.5 Gram(s) IV Push once  dextrose 50% Injectable 25 Gram(s) IV Push once  dextrose 50% Injectable 25 Gram(s) IV Push once  glucagon  Injectable 1 milliGRAM(s) IntraMuscular once  insulin lispro (ADMELOG) corrective regimen sliding scale   SubCutaneous every 6 hours  metoprolol tartrate 50 milliGRAM(s) Oral two times a day  predniSONE   Tablet 10 milliGRAM(s) Oral every 24 hours    MEDICATIONS  (PRN):  albuterol/ipratropium for Nebulization 3 milliLiter(s) Nebulizer every 6 hours PRN Shortness of Breath and/or Wheezing      ALLERGIES/INTOLERANCES:  Allergies    No Known Allergies    Intolerances        LABS:                        8.9    20.47 )-----------( 101      ( 2021 15:39 )             27.4     12    142  |  103  |  55<H>  ----------------------------<  144<H>  3.9   |  25  |  2.24<H>    Ca    7.7<L>      2021 07:46  Phos  3.4       Mg     1.5         TPro  6.3  /  Alb  3.1<L>  /  TBili  5.2<H>  /  DBili  x   /  AST  54<H>  /  ALT  195<H>  /  AlkPhos  143<H>  12    PT/INR - ( 2021 07:46 )   PT: 13.8 sec;   INR: 1.16          PTT - ( 2021 07:46 )  PTT:30.9 sec  CAPILLARY BLOOD GLUCOSE      POCT Blood Glucose.: 189 mg/dL (2021 06:19)            Urinalysis Basic - ( 2021 14:03 )    Color: Yellow / Appearance: Clear / S.020 / pH: x  Gluc: x / Ketone: NEGATIVE  / Bili: Negative / Urobili: 2.0 E.U./dL   Blood: x / Protein: NEGATIVE mg/dL / Nitrite: NEGATIVE   Leuk Esterase: NEGATIVE / RBC: < 5 /HPF / WBC < 5 /HPF   Sq Epi: x / Non Sq Epi: 0-5 /HPF / Bacteria: Present /HPF            Microbiology:    Culture - Blood (collected 2021 11:54)  Source: .Blood Blood-Peripheral  Preliminary Report (2021 00:00):    No growth at 12 hours    Culture - Blood (collected 2021 11:54)  Source: .Blood Blood-Peripheral  Preliminary Report (2021 00:00):    No growth at 12 hours        RADIOLOGY, EKG AND ADDITIONAL TESTS: Reviewed.

## 2021-01-13 NOTE — CONSULT NOTE ADULT - SUBJECTIVE AND OBJECTIVE BOX
Cardiology fellow Consult note    HPI:  84 y/o man with no reported PMHx possibly Afib? ( states not on any medications) who presented initially to [facility] ED after EMS found patient down on the street hypothermic to 77F and with altered mental status. Initially admitted to the MICU, intubated, required broadspectrum antibiotics, extubated transferred to medicine tele. Course c/b by hemodynamically stable, asymptomatic SVT episodes that broke vagal maneuvers and the subsequent one required a push of MTP IV x 1. He also has a stable a psoas muscle hematoma with a stable Hb and now has spiked a fever of 100.7 this am, which the primary team is working up. His tachyarrhythmias are independent of his fever spikes. Patient denies any chest pain, shortness of breath, orthopnea, PND presyncope, syncope, ankle swelling, palpitations.     OP Cardiologist: None    ROS: A 10-point review of systems was otherwise negative.    PAST MEDICAL & SURGICAL HISTORY:  Medical history unknown    No significant past surgical history        SOCIAL HISTORY:   FAMILY HISTORY:      ALLERGIES: 	  No Known Allergies    PHYSICAL EXAM:  T(C): 37.1 (01-13-21 @ 10:00), Max: 37.2 (01-12-21 @ 18:29)  T(F): 98.8 (01-13-21 @ 10:00), Max: 99 (01-12-21 @ 18:29)  HR: 70 (01-13-21 @ 10:01) (62 - 160)  BP: 116/59 (01-13-21 @ 10:01) (113/81 - 165/115)  BP(mean): 82 (01-13-21 @ 10:01) (79 - 94)  ABP: --  RR: 19 (01-13-21 @ 08:52) (16 - 20)  SpO2: 98% (01-13-21 @ 10:01) (95% - 100%)    GEN: Awake, comfortable. NAD.   HEENT: Mucosa moist. No JVD.   RESP: CTA b/l  CV: RRR, normal s1/s2. No m/r/g.  ABD: Soft, NTND. BS+  EXT: Warm. No edema, PP 2+  NEURO: AAOx3. No focal deficits.    I&O's Summary    12 Jan 2021 07:01  -  13 Jan 2021 07:00  --------------------------------------------------------  IN: 850 mL / OUT: 1100 mL / NET: -250 mL      LABS:	 	  CARDIAC MARKERS ( 13 Jan 2021 06:34 )  x     / x     / 238 U/L / x     / x          PT/INR - ( 12 Jan 2021 07:46 )   PT: 13.8 sec;   INR: 1.16          PTT - ( 12 Jan 2021 07:46 )  PTT:30.9 sec  proBNP: Serum Pro-Brain Natriuretic Peptide: 438 pg/mL <H> [0 - 300] (12-31 @ 18:37)  Serum Pro-Brain Natriuretic Peptide: 363 pg/mL <H> (12-31 @ 14:09)    Lipid Profile:   HgA1c:   Thyroid Stimulating Hormone, Serum: 0.168 uIU/mL (01-13-21 @ 06:34)  Thyroid Stimulating Hormone, Serum: 0.856 uIU/mL (12-31-20 @ 23:46)    Cardiac Diagnostics  TELEMETRY: Intermittent Atrial fibrillation w RVR, one with a conversion pause., PACs	    ECG(1/1/21): NSR, IVCD, LAFB  ECG(1/9/21): NSR, RBBB, LAFB, PACs  	  ECHO: EF 65%, Normal BiV function and size, mod TR, PASP 37, No pericardial effusion  STRESS: None  CATH: None

## 2021-01-13 NOTE — CONSULT NOTE ADULT - ASSESSMENT
85M with w known prior cardiac and or medical history found down on the street with hypothermia initially intubated in the MICU for pneumonia, GIB (not requiring intervention ANTONELLA requiring transient CVVHD, L psoas muscle hematoma with intermittent atrial fibrillation with w RVR is being consulted for asymptomatic, HD stable SVT    #SVT: Likely an atrial rhythm (has evidence of 2:1 flutter and possibly Atrial fibrillation on the other) His tele is significant for breakthrough Atrial fibrillation w rvr and a conversion pause.   - recommend increasing 50 mg po q12, since a lot of is RVRs are early AM  - His [id number] is atleast 2, hence he will require Eliquis, however primary team to figure out if patient is reliable to FU, if not, he should not be on AC  - Replete electrolytes to maintain K>4, Mg>2.   - Incentive Spirometry, Out of bed to chair, Encourage physical therapy    Prelim recs    Thank you for allowing to participate in the care of this patient  Recommendations preliminary until attending attestation and signature    ATIF Gaines  Cardiology Fellow, PGY 7 85M with w known prior cardiac and or medical history found down on the street with hypothermia initially intubated in the MICU for pneumonia, GIB (not requiring intervention ANTONELLA requiring transient CVVHD, L psoas muscle hematoma with intermittent atrial fibrillation with w RVR is being consulted for asymptomatic, HD stable SVT    #SVT: Likely an atrial rhythm (has evidence of 2:1 flutter and possibly Atrial fibrillation on the other) His tele is significant for breakthrough Atrial fibrillation w rvr and a conversion pause.   - recommend increasing 50 mg po q12, since a lot of is RVRs are early AM  - His [id number] is atleast 2, hence he will require Eliquis, however primary team to figure out if patient is reliable to FU, if not, he should not be on AC  - Primary team will have to discuss and figure out the patient's disposition and follow up prior to us proceeding with any long term plans like an ablation or anti-coagulation  - If Psoas muscle hematoma is stable, recommend starting IV heparin for AC  - Replete electrolytes to maintain K>4, Mg>2.   - Incentive Spirometry, Out of bed to chair, Encourage physical therapy    Plan d/w EP attending   Thank you for allowing to participate in the care of this patient  Recommendations preliminary until attending attestation and signature    ATIF Gaines  Cardiology Fellow, PGY 7

## 2021-01-13 NOTE — CONSULT NOTE ADULT - CONSULT REQUESTED DATE/TIME
01-Jan-2021 00:55
01-Jan-2021 01:20
01-Jan-2021 13:20
13-Jan-2021 13:14
31-Dec-2020 22:25
03-Jan-2021 13:03
04-Jan-2021 14:00

## 2021-01-13 NOTE — PROGRESS NOTE ADULT - PROBLEM SELECTOR PLAN 4
Pt with sCr 2.93 on presentation (unknown baseline), improved s/p fluid boluses in ED. 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    - CVVHD stopped 1/7  - Intermittent hemodialysis per renal   - Continue with bladder scans  - consult renal re: need for dialysis on 1/12 Pt with sCr 2.93 on presentation (unknown baseline), improved s/p fluid boluses in ED. 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    - CVVHD stopped 1/7  - Intermittent hemodialysis per renal   - Continue with bladder scans  - per renal, recovering off hemodialysis - no further need for dialysis  - 1/12 removed HD catheter given concern for infection - febrile to 100.7 Pt with sCr 2.93 on presentation (unknown baseline), improved s/p fluid boluses in ED. 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; removed on 1/12 (patient no longer need HD and spiked a fever)   - CVVHD stopped 1/7  - Intermittent hemodialysis per renal   - Continue with bladder scans  - per renal, recovering off hemodialysis - no further need for dialysis  - 1/12 removed HD catheter given concern for infection - febrile to 100.7

## 2021-01-13 NOTE — PROGRESS NOTE ADULT - PROBLEM SELECTOR PLAN 10
F: tolerating PO, no IVF  E: replete K<4, Mg<2  N: puree thin liquids    VTE Prophylaxis: SCDs  GI: not needed  C: Full Code  D: [facility] F: tolerating PO, no IVF  E: replete K<4, Mg<2  N: puree thin liquids    VTE Prophylaxis: SCDs; heparin 5k subQ  GI: not needed  C: Full Code  D: [facility] F: tolerating PO, no IVF  E: replete K<4, Mg<2  N: puree thin liquids    VTE Prophylaxis: SCDs; heparin 5k subQ; holding full AC in setting of recent bleeds  GI: not needed  C: Full Code  D: [facility]

## 2021-01-13 NOTE — CONSULT NOTE ADULT - CONSULT REASON
ANTONELLA
Encephalopathy  Respiratory Failure
Mesenteric Ischemia
R Pubic Ramus Fx
Thrombocytopenia
SVT
coffee ground emesis

## 2021-01-14 LAB
ALBUMIN SERPL ELPH-MCNC: 2.7 G/DL — LOW (ref 3.3–5)
ALP SERPL-CCNC: 115 U/L — SIGNIFICANT CHANGE UP (ref 40–120)
ALT FLD-CCNC: 118 U/L — HIGH (ref 10–45)
ANION GAP SERPL CALC-SCNC: 11 MMOL/L — SIGNIFICANT CHANGE UP (ref 5–17)
AST SERPL-CCNC: 34 U/L — SIGNIFICANT CHANGE UP (ref 10–40)
BASOPHILS # BLD AUTO: 0.1 K/UL — SIGNIFICANT CHANGE UP (ref 0–0.2)
BASOPHILS NFR BLD AUTO: 0.9 % — SIGNIFICANT CHANGE UP (ref 0–2)
BILIRUB DIRECT SERPL-MCNC: 2.2 MG/DL — HIGH (ref 0–0.2)
BILIRUB INDIRECT FLD-MCNC: 1.6 MG/DL — HIGH (ref 0.2–1)
BILIRUB SERPL-MCNC: 3.7 MG/DL — HIGH (ref 0.2–1.2)
BLD GP AB SCN SERPL QL: NEGATIVE — SIGNIFICANT CHANGE UP
BUN SERPL-MCNC: 36 MG/DL — HIGH (ref 7–23)
CALCIUM SERPL-MCNC: 7.9 MG/DL — LOW (ref 8.4–10.5)
CHLORIDE SERPL-SCNC: 103 MMOL/L — SIGNIFICANT CHANGE UP (ref 96–108)
CO2 SERPL-SCNC: 27 MMOL/L — SIGNIFICANT CHANGE UP (ref 22–31)
CREAT SERPL-MCNC: 1.68 MG/DL — HIGH (ref 0.5–1.3)
EOSINOPHIL # BLD AUTO: 0 K/UL — SIGNIFICANT CHANGE UP (ref 0–0.5)
EOSINOPHIL NFR BLD AUTO: 0 % — SIGNIFICANT CHANGE UP (ref 0–6)
GLUCOSE BLDC GLUCOMTR-MCNC: 121 MG/DL — HIGH (ref 70–99)
GLUCOSE BLDC GLUCOMTR-MCNC: 128 MG/DL — HIGH (ref 70–99)
GLUCOSE BLDC GLUCOMTR-MCNC: 143 MG/DL — HIGH (ref 70–99)
GLUCOSE BLDC GLUCOMTR-MCNC: 145 MG/DL — HIGH (ref 70–99)
GLUCOSE BLDC GLUCOMTR-MCNC: 208 MG/DL — HIGH (ref 70–99)
GLUCOSE SERPL-MCNC: 138 MG/DL — HIGH (ref 70–99)
HCT VFR BLD CALC: 26.4 % — LOW (ref 39–50)
HGB BLD-MCNC: 8.6 G/DL — LOW (ref 13–17)
LYMPHOCYTES # BLD AUTO: 0.19 K/UL — LOW (ref 1–3.3)
LYMPHOCYTES # BLD AUTO: 1.8 % — LOW (ref 13–44)
MAGNESIUM SERPL-MCNC: 1.4 MG/DL — LOW (ref 1.6–2.6)
MCHC RBC-ENTMCNC: 30.8 PG — SIGNIFICANT CHANGE UP (ref 27–34)
MCHC RBC-ENTMCNC: 32.6 GM/DL — SIGNIFICANT CHANGE UP (ref 32–36)
MCV RBC AUTO: 94.6 FL — SIGNIFICANT CHANGE UP (ref 80–100)
MONOCYTES # BLD AUTO: 1.34 K/UL — HIGH (ref 0–0.9)
MONOCYTES NFR BLD AUTO: 12.6 % — SIGNIFICANT CHANGE UP (ref 2–14)
NEUTROPHILS # BLD AUTO: 8.98 K/UL — HIGH (ref 1.8–7.4)
NEUTROPHILS NFR BLD AUTO: 84.7 % — HIGH (ref 43–77)
PHOSPHATE SERPL-MCNC: 2.3 MG/DL — LOW (ref 2.5–4.5)
PLAT MORPH BLD: ABNORMAL
PLATELET # BLD AUTO: 123 K/UL — LOW (ref 150–400)
POTASSIUM SERPL-MCNC: 3.4 MMOL/L — LOW (ref 3.5–5.3)
POTASSIUM SERPL-SCNC: 3.4 MMOL/L — LOW (ref 3.5–5.3)
PROT SERPL-MCNC: 6 G/DL — SIGNIFICANT CHANGE UP (ref 6–8.3)
RBC # BLD: 2.79 M/UL — LOW (ref 4.2–5.8)
RBC # FLD: 18.1 % — HIGH (ref 10.3–14.5)
RBC BLD AUTO: ABNORMAL
RH IG SCN BLD-IMP: POSITIVE — SIGNIFICANT CHANGE UP
SARS-COV-2 RNA SPEC QL NAA+PROBE: SIGNIFICANT CHANGE UP
SODIUM SERPL-SCNC: 141 MMOL/L — SIGNIFICANT CHANGE UP (ref 135–145)
T3 SERPL-MCNC: 62 NG/DL — LOW (ref 80–200)
WBC # BLD: 10.6 K/UL — HIGH (ref 3.8–10.5)
WBC # FLD AUTO: 10.6 K/UL — HIGH (ref 3.8–10.5)

## 2021-01-14 PROCEDURE — 99233 SBSQ HOSP IP/OBS HIGH 50: CPT | Mod: GC

## 2021-01-14 RX ORDER — POTASSIUM PHOSPHATE, MONOBASIC POTASSIUM PHOSPHATE, DIBASIC 236; 224 MG/ML; MG/ML
15 INJECTION, SOLUTION INTRAVENOUS ONCE
Refills: 0 | Status: COMPLETED | OUTPATIENT
Start: 2021-01-14 | End: 2021-01-14

## 2021-01-14 RX ORDER — POTASSIUM CHLORIDE 20 MEQ
40 PACKET (EA) ORAL ONCE
Refills: 0 | Status: COMPLETED | OUTPATIENT
Start: 2021-01-14 | End: 2021-01-14

## 2021-01-14 RX ORDER — MAGNESIUM SULFATE 500 MG/ML
4 VIAL (ML) INJECTION ONCE
Refills: 0 | Status: COMPLETED | OUTPATIENT
Start: 2021-01-14 | End: 2021-01-14

## 2021-01-14 RX ORDER — METOPROLOL TARTRATE 50 MG
1 TABLET ORAL
Qty: 0 | Refills: 0 | DISCHARGE
Start: 2021-01-14

## 2021-01-14 RX ADMIN — HEPARIN SODIUM 5000 UNIT(S): 5000 INJECTION INTRAVENOUS; SUBCUTANEOUS at 21:18

## 2021-01-14 RX ADMIN — Medication 10 MILLIGRAM(S): at 06:05

## 2021-01-14 RX ADMIN — Medication 50 MILLIGRAM(S): at 14:27

## 2021-01-14 RX ADMIN — Medication 4: at 12:46

## 2021-01-14 RX ADMIN — CHLORHEXIDINE GLUCONATE 1 APPLICATION(S): 213 SOLUTION TOPICAL at 06:16

## 2021-01-14 RX ADMIN — HEPARIN SODIUM 5000 UNIT(S): 5000 INJECTION INTRAVENOUS; SUBCUTANEOUS at 06:04

## 2021-01-14 RX ADMIN — POTASSIUM PHOSPHATE, MONOBASIC POTASSIUM PHOSPHATE, DIBASIC 62.5 MILLIMOLE(S): 236; 224 INJECTION, SOLUTION INTRAVENOUS at 11:41

## 2021-01-14 RX ADMIN — Medication 100 GRAM(S): at 10:08

## 2021-01-14 RX ADMIN — HEPARIN SODIUM 5000 UNIT(S): 5000 INJECTION INTRAVENOUS; SUBCUTANEOUS at 14:26

## 2021-01-14 RX ADMIN — Medication 40 MILLIEQUIVALENT(S): at 09:55

## 2021-01-14 RX ADMIN — Medication 50 MILLIGRAM(S): at 21:18

## 2021-01-14 RX ADMIN — Medication 50 MILLIGRAM(S): at 06:04

## 2021-01-14 NOTE — PROGRESS NOTE ADULT - PROBLEM SELECTOR PLAN 6
Pt found to be hypotensive to 80s/40s likely 2/2 to hemorrhagic/hypovolemic shock vs septic shock (unclear source) vs distributive (rewarming). Hemorrhagic source evidenced by Hgb drop from 8.2 to 5.9 with coffee grounds suctioned from sump placed. Pt with alerted mentation, low urine output, and cold to touch. s/p volume resuscitation with blood products as well as intermittent fluid boluses. s/p levo gtt (weaned 1/7) and 7 day zosyn course (-1/8).  RESOLVED

## 2021-01-14 NOTE — PROGRESS NOTE ADULT - ATTENDING COMMENTS
CT abdomen/pelvis performed given drop in hgb and found to have new left psoas hematoma. Will consult IR.   Change meds to PO given that he passed bedside swallow eval. Keep NPO for now pending IR evaluation.
I independently performed the key portions of the evaluation and management service provided. I agree with the above history, physical, and plan which I have reviewed and edited where appropriate. I find ANTONELLA improving. Follow SCr.  See full note. (Patient seen earlier in day.)
I independently performed the key portions of the evaluation and management service provided. I agree with the above history, physical, and plan which I have reviewed and edited where appropriate. I find ANTONELLA, off CVVHD. Start IHD. Keep MAP > 65.  See full note. (Patient seen earlier in day.) .
I independently performed the key portions of the evaluation and management service provided. I agree with the above history, physical, and plan which I have reviewed and edited where appropriate. I find ANTONELLA, on CVVHD. Cont CVVHD. Keep MAP > 65.  See full note. (Patient seen earlier in day.)
I independently performed the key portions of the evaluation and management service provided. I agree with the above history, physical, and plan which I have reviewed and edited where appropriate. I find ANTONELLA, on CVVHD. Cont CVVHD. Keep MAP > 65.  See full note. (Patient seen earlier in day.) .
I independently performed the key portions of the evaluation and management service provided. I agree with the above history, physical, and plan which I have reviewed and edited where appropriate. I find BP improved. Anemia. ANTONELLA. Continue dialysis tomorrow. Epo at HD. Transfuse PRN.  See full note. (Patient seen earlier in day.)
unclear etiology of rising WBC count. No other signs and symptoms of infection. he is s/p 7 day course of abx for aspiration pneumonia. Will monitor for now. plan for speech and swallow eval today. Will start standing duonebs, hypertonic saline with chest PT to help with mucociliary clearance.
ATN, oliguric after being found down, failed fluid trial now on CVVHD, continue maintaining net even hourly in terms of UF. Lactate trending down.
No signs of an active bleed.  Monitor Hgb.  Monitor LFTs, downtrending -- likely shock liver.
Platelet count now over 100K with improvement in liver and renal function.  Suspect initial presentation of cytopenias on CBC largely secondary to DIC/Shock    Hematoma noted on imaging from 1.10.2021.  Etiology from fall ?  check coags/ fibrinogen    IR for evaluation of the hematoma, likely the etiology of his recent drop in Hgb
ATN, oliguric after being found down, failed fluid trial yesterday now on CVVHD. 3.5L net positive since admission, increase UF to net 100cc/hr negative hourly as BP tolerates.
Patient remains HD stable. No further drop in HgB. Per IR, no embolization given patient's HD stability and stable hemoglobin. Will continue to monitor CBC and hold AC for a.fib.   Patient is stable for transfer to [facility].
Patient extubated this AM. Will taper hydrocortisone. Will speak to nephrology regarding transitioning to intermittent HD.
No evidence of bleeding. Repeat CBC. If stable can go to regional.
No evidence of bleeding and remains in SR. Will avoid anticoagulation with recent bleed and continued SR and risk of rebleeding.

## 2021-01-14 NOTE — PROGRESS NOTE ADULT - ASSESSMENT
86 y/o man with no reported PMHx (possibly Afib, states not on any medications) who presented initially to [facility] ED after EMS found patient down on the street hypothermic to 77F and with altered mental status. Pt admitted to MICU for further evaluation and management of hypothermia, shock, and r/o ischemic bowel/ gastrointestinal hemorrhage. Intubated 12/31, now extubated 1/7 without complications. Completed 7 day course of abx for presumed PNA. Course further complicated by new L psoas hematoma found 1/10; pending IR intervention.

## 2021-01-14 NOTE — PROGRESS NOTE ADULT - PROBLEM SELECTOR PROBLEM 1
ESRD (end stage renal disease)
ANTONELLA (acute kidney injury)
ANTONELLA (acute kidney injury)
Hematoma of muscle
Anemia

## 2021-01-14 NOTE — DISCHARGE NOTE NURSING/CASE MANAGEMENT/SOCIAL WORK - NSDCFUADDAPPT_GEN_ALL_CORE_FT
Please call Dr. Peña to schedule an appointment with him in the next two weeks. Please call [phone].

## 2021-01-14 NOTE — PROGRESS NOTE ADULT - PROBLEM SELECTOR PROBLEM 7
Acute liver failure without hepatic coma

## 2021-01-14 NOTE — PROGRESS NOTE ADULT - PROBLEM SELECTOR PLAN 4
Pt with sCr 2.93 on presentation (unknown baseline), improved s/p fluid boluses in ED. 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; removed on 1/12 (patient no longer need HD and spiked a fever)   - CVVHD stopped 1/7  - Intermittent hemodialysis per renal   - Continue with bladder scans  - per renal, recovering off hemodialysis - no further need for dialysis  - 1/12 removed HD catheter given concern for infection - febrile to 100.7

## 2021-01-14 NOTE — PROGRESS NOTE ADULT - SUBJECTIVE AND OBJECTIVE BOX
*** INCOMPLETE ***    OVERNIGHT EVENTS: As per overnight staff, there were no acute events overnight    INTERVAL EVENTS:    SUBJECTIVE HPI: Patient seen and examined at bedside. Patient resting comfortably, no complaints at this time. Patient denies: fever, chills, weakness, dizziness, headaches, changes in vision, chest pain, palpitations, shortness of breath, cough, N/V, diarrhea or constipation, dysuria, LE edema. ROS otherwise negative.      VITAL SIGNS:  Vital Signs Last 24 Hrs  T(C): 36.9 (2021 05:08), Max: 37.2 (2021 14:00)  T(F): 98.4 (2021 05:08), Max: 98.9 (2021 14:00)  HR: 86 (2021 05:00) (62 - 92)  BP: 137/65 (2021 05:00) (113/81 - 157/72)  BP(mean): 93 (2021 05:00) (82 - 108)  RR: 17 (2021 05:00) (16 - 19)  SpO2: 100% (2021 05:00) (98% - 100%)      21 @ 07:01  -  21 @ 07:00  --------------------------------------------------------  IN: 850 mL / OUT: 1100 mL / NET: -250 mL        PHYSICAL EXAM:  GENERAL: NAD  HEAD:  Atraumatic, Normocephalic  EYES: EOMI, PERRLA, conjunctiva and sclera clear  ENMT: Moist mucous membranes  NECK: Supple, No JVD  NERVOUS SYSTEM: Alert & Oriented X3, Good concentration  CHEST/LUNG: Clear to auscultation bilaterally; No rales, rhonchi, wheezing, or rubs  HEART: Irregular rate and rhythm; No murmurs, rubs, or gallops  ABDOMEN: Soft, Nontender, Nondistended; Bowel sounds present  EXTREMITIES:  1+ edema of the L upper thigh (worse on the L). Trace pedal edema B/L; swelling on RUE   Vascular: 2+ peripheral pulses x4        MEDICATIONS:  MEDICATIONS  (STANDING):  chlorhexidine 2% Cloths 1 Application(s) Topical <User Schedule>  dextrose 40% Gel 15 Gram(s) Oral once  dextrose 5%. 1000 milliLiter(s) (50 mL/Hr) IV Continuous <Continuous>  dextrose 5%. 1000 milliLiter(s) (100 mL/Hr) IV Continuous <Continuous>  dextrose 50% Injectable 25 Gram(s) IV Push once  dextrose 50% Injectable 12.5 Gram(s) IV Push once  dextrose 50% Injectable 25 Gram(s) IV Push once  dextrose 50% Injectable 25 Gram(s) IV Push once  glucagon  Injectable 1 milliGRAM(s) IntraMuscular once  heparin   Injectable 5000 Unit(s) SubCutaneous every 8 hours  insulin lispro (ADMELOG) corrective regimen sliding scale   SubCutaneous every 6 hours  metoprolol tartrate 50 milliGRAM(s) Oral three times a day  predniSONE   Tablet 10 milliGRAM(s) Oral every 24 hours    MEDICATIONS  (PRN):  albuterol/ipratropium for Nebulization 3 milliLiter(s) Nebulizer every 6 hours PRN Shortness of Breath and/or Wheezing      ALLERGIES/INTOLERANCES:  Allergies    No Known Allergies    Intolerances        LABS:                        8.6    10.60 )-----------( 123      ( 2021 06:06 )             26.4     01-    138  |  102  |  47<H>  ----------------------------<  188<H>  4.3   |  23  |  2.05<H>    Ca    7.7<L>      2021 06:34  Phos  2.7       Mg     2.0         TPro  6.1  /  Alb  3.0<L>  /  TBili  4.0<H>  /  DBili  x   /  AST  40  /  ALT  147<H>  /  AlkPhos  136<H>  -13    PT/INR - ( 2021 07:46 )   PT: 13.8 sec;   INR: 1.16          PTT - ( 2021 07:46 )  PTT:30.9 sec  CAPILLARY BLOOD GLUCOSE      POCT Blood Glucose.: 128 mg/dL (2021 05:52)      CARDIAC MARKERS ( 2021 06:34 )  x     / x     / 238 U/L / x     / x            Urinalysis Basic - ( 2021 14:03 )    Color: Yellow / Appearance: Clear / S.020 / pH: x  Gluc: x / Ketone: NEGATIVE  / Bili: Negative / Urobili: 2.0 E.U./dL   Blood: x / Protein: NEGATIVE mg/dL / Nitrite: NEGATIVE   Leuk Esterase: NEGATIVE / RBC: < 5 /HPF / WBC < 5 /HPF   Sq Epi: x / Non Sq Epi: 0-5 /HPF / Bacteria: Present /HPF            Microbiology:    Culture - Blood (collected 2021 11:54)  Source: .Blood Blood-Peripheral  Preliminary Report (2021 12:00):    No growth at 1 day.    Culture - Blood (collected 2021 11:54)  Source: .Blood Blood-Peripheral  Preliminary Report (2021 12:00):    No growth at 1 day.        RADIOLOGY, EKG AND ADDITIONAL TESTS: Reviewed. *** INCOMPLETE ***    OVERNIGHT EVENTS: As per overnight staff, there were no acute events overnight    INTERVAL EVENTS: LE doppler negative for DVT. RUQ u/s negative for acute cholecystitis, + hepatic steatosis    SUBJECTIVE HPI: Patient seen and examined at bedside. Patient resting comfortably, no complaints at this time. Patient denies: fever, chills, weakness, dizziness, headaches, changes in vision, chest pain, palpitations, shortness of breath, cough, N/V, diarrhea or constipation, dysuria, LE edema. ROS otherwise negative.      VITAL SIGNS:  Vital Signs Last 24 Hrs  T(C): 36.9 (2021 05:08), Max: 37.2 (2021 14:00)  T(F): 98.4 (2021 05:08), Max: 98.9 (2021 14:00)  HR: 86 (2021 05:00) (62 - 92)  BP: 137/65 (2021 05:00) (113/81 - 157/72)  BP(mean): 93 (2021 05:00) (82 - 108)  RR: 17 (2021 05:00) (16 - 19)  SpO2: 100% (2021 05:00) (98% - 100%)      21 @ 07:01  -  21 @ 07:00  --------------------------------------------------------  IN: 850 mL / OUT: 1100 mL / NET: -250 mL        PHYSICAL EXAM:  GENERAL: NAD  HEAD:  Atraumatic, Normocephalic  EYES: EOMI, PERRLA, conjunctiva and sclera clear  ENMT: Moist mucous membranes  NECK: Supple, No JVD  NERVOUS SYSTEM: Alert & Oriented X3, Good concentration  CHEST/LUNG: Clear to auscultation bilaterally; No rales, rhonchi, wheezing, or rubs  HEART: Irregular rate and rhythm; No murmurs, rubs, or gallops  ABDOMEN: Soft, Nontender, Nondistended; Bowel sounds present  EXTREMITIES:  1+ edema of the L upper thigh (worse on the L). Trace pedal edema B/L; swelling on RUE   Vascular: 2+ peripheral pulses x4        MEDICATIONS:  MEDICATIONS  (STANDING):  chlorhexidine 2% Cloths 1 Application(s) Topical <User Schedule>  dextrose 40% Gel 15 Gram(s) Oral once  dextrose 5%. 1000 milliLiter(s) (50 mL/Hr) IV Continuous <Continuous>  dextrose 5%. 1000 milliLiter(s) (100 mL/Hr) IV Continuous <Continuous>  dextrose 50% Injectable 25 Gram(s) IV Push once  dextrose 50% Injectable 12.5 Gram(s) IV Push once  dextrose 50% Injectable 25 Gram(s) IV Push once  dextrose 50% Injectable 25 Gram(s) IV Push once  glucagon  Injectable 1 milliGRAM(s) IntraMuscular once  heparin   Injectable 5000 Unit(s) SubCutaneous every 8 hours  insulin lispro (ADMELOG) corrective regimen sliding scale   SubCutaneous every 6 hours  metoprolol tartrate 50 milliGRAM(s) Oral three times a day  predniSONE   Tablet 10 milliGRAM(s) Oral every 24 hours    MEDICATIONS  (PRN):  albuterol/ipratropium for Nebulization 3 milliLiter(s) Nebulizer every 6 hours PRN Shortness of Breath and/or Wheezing      ALLERGIES/INTOLERANCES:  Allergies    No Known Allergies    Intolerances        LABS:                        8.6    10.60 )-----------( 123      ( 2021 06:06 )             26.4     01-    138  |  102  |  47<H>  ----------------------------<  188<H>  4.3   |  23  |  2.05<H>    Ca    7.7<L>      2021 06:34  Phos  2.7       Mg     2.0         TPro  6.1  /  Alb  3.0<L>  /  TBili  4.0<H>  /  DBili  x   /  AST  40  /  ALT  147<H>  /  AlkPhos  136<H>  -13    PT/INR - ( 2021 07:46 )   PT: 13.8 sec;   INR: 1.16          PTT - ( 2021 07:46 )  PTT:30.9 sec  CAPILLARY BLOOD GLUCOSE      POCT Blood Glucose.: 128 mg/dL (2021 05:52)      CARDIAC MARKERS ( 2021 06:34 )  x     / x     / 238 U/L / x     / x            Urinalysis Basic - ( 2021 14:03 )    Color: Yellow / Appearance: Clear / S.020 / pH: x  Gluc: x / Ketone: NEGATIVE  / Bili: Negative / Urobili: 2.0 E.U./dL   Blood: x / Protein: NEGATIVE mg/dL / Nitrite: NEGATIVE   Leuk Esterase: NEGATIVE / RBC: < 5 /HPF / WBC < 5 /HPF   Sq Epi: x / Non Sq Epi: 0-5 /HPF / Bacteria: Present /HPF            Microbiology:    Culture - Blood (collected 2021 11:54)  Source: .Blood Blood-Peripheral  Preliminary Report (2021 12:00):    No growth at 1 day.    Culture - Blood (collected 2021 11:54)  Source: .Blood Blood-Peripheral  Preliminary Report (2021 12:00):    No growth at 1 day.        RADIOLOGY, EKG AND ADDITIONAL TESTS: Reviewed.  EXAM:  US ABDOMEN LIMITED                        PROCEDURE DATE:  2021    INTERPRETATION:  RIGHT UPPER QUADRANT ULTRASOUND dated 2021 5:42 PM    INDICATION: Fever, right upper quadrant pain. Assess for cholecystitis versus cholangitis.  PRIOR STUDIES: Abdominal ultrasound 2021, CT abdomen/pelvis 2020.  FINDINGS:  Liver: Increased echogenicity with no visible focal abnormality. Liver size is normal, measuring 13.3 cm in craniocaudal dimension. Hepatopetal flow is noted in the main portal vein.  Intrahepatic ducts: Not dilated.  Common bile duct: Normal diameter, measuring 0.4 cm.  Gallbladder: No visible gallstones. No wall thickening or pericholecystic fluid. Negative sonographic Payne's sign.  Pancreas: The visualized portions were normal in appearance.  Abdominal aorta: The visualized portions were unremarkable.  Inferior vena cava: The visualized portions were normal in appearance.  Right kidney: Normal echogenicity. Mid pole cyst measuring 0.8 x 0.8 x 0.8 cm. Length of 10.4 cm.  Ascites: None.    IMPRESSION:  No evidence of cholelithiasis or acute cholecystitis.  Hepatic steatosis.    EXAM:  US DPLX LWR EXT VEINS COMPL BI                        PROCEDURE DATE:  2021      INTERPRETATION:  VENOUS DUPLEX DOPPLER OF BOTH LOWER EXTREMITIES dated 2021 6:15 PM    INDICATION: Sepsis.  TECHNIQUE: Duplex Doppler evaluation including gray-scale ultrasound imaging, color flow Doppler imaging, and Doppler spectral analysis of the veins of both lower extremities was performed.  COMPARISON: 2021.  FINDINGS:  Thigh veins: The common femoral, femoral, popliteal, proximal greater saphenous, and proximal deep femoral veins are patent and free of thrombus bilaterally. The veins are normally compressible and have normal phasic flow and augmentation response.  Calf veins: The paired peroneal and posterior tibial calf veins are patent bilaterally. Vascular calf veins not visualized due to edema.    IMPRESSION:  No deep vein thrombosis seen.   OVERNIGHT EVENTS: As per overnight staff, there were no acute events overnight    INTERVAL EVENTS: LE doppler negative for DVT. RUQ u/s negative for acute cholecystitis, + hepatic steatosis    SUBJECTIVE HPI: Patient seen and examined at bedside. Patient resting comfortably, no complaints at this time. Patient denies: dizziness, headaches, chest pain, palpitations, SOB, cough, N/V. ROS otherwise negative.      VITAL SIGNS:  Vital Signs Last 24 Hrs  T(C): 36.9 (2021 05:08), Max: 37.2 (2021 14:00)  T(F): 98.4 (2021 05:08), Max: 98.9 (2021 14:00)  HR: 86 (2021 05:00) (62 - 92)  BP: 137/65 (2021 05:00) (113/81 - 157/72)  BP(mean): 93 (2021 05:00) (82 - 108)  RR: 17 (2021 05:00) (16 - 19)  SpO2: 100% (2021 05:00) (98% - 100%)      21 @ 07:01  -  21 @ 07:00  --------------------------------------------------------  IN: 850 mL / OUT: 1100 mL / NET: -250 mL        PHYSICAL EXAM:  GENERAL: NAD  HEAD:  Atraumatic, Normocephalic  EYES: EOMI, PERRLA, conjunctiva and sclera clear  ENMT: Moist mucous membranes  NECK: Supple, No JVD  NERVOUS SYSTEM: Alert & Oriented X3, Good concentration  CHEST/LUNG: Clear to auscultation bilaterally; No rales, rhonchi, wheezing, or rubs  HEART: Irregular rate and rhythm; No murmurs, rubs, or gallops  ABDOMEN: Soft, Nontender, Nondistended; Bowel sounds present  EXTREMITIES:  1+ edema of the L upper thigh. Trace pedal edema B/L (L>R); swelling on RUE   Vascular: 2+ peripheral pulses x4        MEDICATIONS:  MEDICATIONS  (STANDING):  chlorhexidine 2% Cloths 1 Application(s) Topical <User Schedule>  dextrose 40% Gel 15 Gram(s) Oral once  dextrose 5%. 1000 milliLiter(s) (50 mL/Hr) IV Continuous <Continuous>  dextrose 5%. 1000 milliLiter(s) (100 mL/Hr) IV Continuous <Continuous>  dextrose 50% Injectable 25 Gram(s) IV Push once  dextrose 50% Injectable 12.5 Gram(s) IV Push once  dextrose 50% Injectable 25 Gram(s) IV Push once  dextrose 50% Injectable 25 Gram(s) IV Push once  glucagon  Injectable 1 milliGRAM(s) IntraMuscular once  heparin   Injectable 5000 Unit(s) SubCutaneous every 8 hours  insulin lispro (ADMELOG) corrective regimen sliding scale   SubCutaneous every 6 hours  metoprolol tartrate 50 milliGRAM(s) Oral three times a day  predniSONE   Tablet 10 milliGRAM(s) Oral every 24 hours    MEDICATIONS  (PRN):  albuterol/ipratropium for Nebulization 3 milliLiter(s) Nebulizer every 6 hours PRN Shortness of Breath and/or Wheezing      ALLERGIES/INTOLERANCES:  Allergies    No Known Allergies    Intolerances        LABS:                        8.6    10.60 )-----------( 123      ( 2021 06:06 )             26.4     01-13    138  |  102  |  47<H>  ----------------------------<  188<H>  4.3   |  23  |  2.05<H>    Ca    7.7<L>      2021 06:34  Phos  2.7       Mg     2.0         TPro  6.1  /  Alb  3.0<L>  /  TBili  4.0<H>  /  DBili  x   /  AST  40  /  ALT  147<H>  /  AlkPhos  136<H>  -13    PT/INR - ( 2021 07:46 )   PT: 13.8 sec;   INR: 1.16          PTT - ( 2021 07:46 )  PTT:30.9 sec  CAPILLARY BLOOD GLUCOSE      POCT Blood Glucose.: 128 mg/dL (2021 05:52)      CARDIAC MARKERS ( 2021 06:34 )  x     / x     / 238 U/L / x     / x            Urinalysis Basic - ( 2021 14:03 )    Color: Yellow / Appearance: Clear / S.020 / pH: x  Gluc: x / Ketone: NEGATIVE  / Bili: Negative / Urobili: 2.0 E.U./dL   Blood: x / Protein: NEGATIVE mg/dL / Nitrite: NEGATIVE   Leuk Esterase: NEGATIVE / RBC: < 5 /HPF / WBC < 5 /HPF   Sq Epi: x / Non Sq Epi: 0-5 /HPF / Bacteria: Present /HPF            Microbiology:    Culture - Blood (collected 2021 11:54)  Source: .Blood Blood-Peripheral  Preliminary Report (2021 12:00):    No growth at 1 day.    Culture - Blood (collected 2021 11:54)  Source: .Blood Blood-Peripheral  Preliminary Report (2021 12:00):    No growth at 1 day.        RADIOLOGY, EKG AND ADDITIONAL TESTS: Reviewed.  EXAM:  US ABDOMEN LIMITED                        PROCEDURE DATE:  2021    INTERPRETATION:  RIGHT UPPER QUADRANT ULTRASOUND dated 2021 5:42 PM    INDICATION: Fever, right upper quadrant pain. Assess for cholecystitis versus cholangitis.  PRIOR STUDIES: Abdominal ultrasound 2021, CT abdomen/pelvis 2020.  FINDINGS:  Liver: Increased echogenicity with no visible focal abnormality. Liver size is normal, measuring 13.3 cm in craniocaudal dimension. Hepatopetal flow is noted in the main portal vein.  Intrahepatic ducts: Not dilated.  Common bile duct: Normal diameter, measuring 0.4 cm.  Gallbladder: No visible gallstones. No wall thickening or pericholecystic fluid. Negative sonographic Payne's sign.  Pancreas: The visualized portions were normal in appearance.  Abdominal aorta: The visualized portions were unremarkable.  Inferior vena cava: The visualized portions were normal in appearance.  Right kidney: Normal echogenicity. Mid pole cyst measuring 0.8 x 0.8 x 0.8 cm. Length of 10.4 cm.  Ascites: None.    IMPRESSION:  No evidence of cholelithiasis or acute cholecystitis.  Hepatic steatosis.    EXAM:  US DPLX LWR EXT VEINS COMPL BI                        PROCEDURE DATE:  2021      INTERPRETATION:  VENOUS DUPLEX DOPPLER OF BOTH LOWER EXTREMITIES dated 2021 6:15 PM    INDICATION: Sepsis.  TECHNIQUE: Duplex Doppler evaluation including gray-scale ultrasound imaging, color flow Doppler imaging, and Doppler spectral analysis of the veins of both lower extremities was performed.  COMPARISON: 2021.  FINDINGS:  Thigh veins: The common femoral, femoral, popliteal, proximal greater saphenous, and proximal deep femoral veins are patent and free of thrombus bilaterally. The veins are normally compressible and have normal phasic flow and augmentation response.  Calf veins: The paired peroneal and posterior tibial calf veins are patent bilaterally. Vascular calf veins not visualized due to edema.    IMPRESSION:  No deep vein thrombosis seen.

## 2021-01-14 NOTE — DISCHARGE NOTE NURSING/CASE MANAGEMENT/SOCIAL WORK - PATIENT PORTAL LINK FT
You can access the FollowMyHealth Patient Portal offered by [facility] by registering at the following website: [email or web address]. By joining [facility]’s FollowMyHealth portal, you will also be able to view your health information using other applications (apps) compatible with our system.

## 2021-01-14 NOTE — PROGRESS NOTE ADULT - PROBLEM SELECTOR PLAN 3
- Intermittently in Afib with RVR on 1/2; noted to be in RVR to 170s on 1/9 during dialysis, spontaneously resolved  - SOSA w/ Grade I left ventricular diastolic dysfunction, Aortic sclerosis without significant stenosis, Trace aortic regurgitation, Moderate tricuspid regurgitation.  - Transition from Lopressor 2.5 q6 to Lopressor po; increased Lopressor po to 25mg BID; -> 1/2 increased to  Lopressor 50 mg BID  - Hold AC in setting of new hematoma - Intermittently in Afib with RVR on 1/2; noted to be in RVR to 170s on 1/9 during dialysis, spontaneously resolved  - SOSA w/ Grade I left ventricular diastolic dysfunction, Aortic sclerosis without significant stenosis, Trace aortic regurgitation, Moderate tricuspid regurgitation.  - Transition from Lopressor 2.5 q6 to Lopressor po; increased Lopressor po to 25mg BID; -> 1/12 increased to  Lopressor 50 mg BID-> 1/13 increased to lopressor 50 TID in setting of more episodes of arrythmia  - Holding full AC in setting of new hematoma but will reconsider starting now that patient's Hgb has remained stable - Intermittently in Afib with RVR on 1/2; noted to be in RVR to 170s on 1/9 during dialysis, spontaneously resolved  - SOSA w/ Grade I left ventricular diastolic dysfunction, Aortic sclerosis without significant stenosis, Trace aortic regurgitation, Moderate tricuspid regurgitation.  - Transition from Lopressor 2.5 q6 to Lopressor po; increased Lopressor po to 25mg BID; -> 1/12 increased to  Lopressor 50 mg BID-> 1/13 increased to lopressor 50 TID in setting of more episodes of arrythmia  - Holding full AC in setting of new hematoma but will reconsider starting now that patient's Hgb has remained stable    #SVT: Likely an atrial rhythm (has evidence of 2:1 flutter and possibly Atrial fibrillation on the other) His tele is significant for breakthrough Atrial fibrillation w rvr and a conversion pause.   - recommend increasing 50 mg po q12, since a lot of is RVRs are early AM  - His [id number] is atleast 2, hence he will require Eliquis, however primary team to figure out if patient is reliable to FU, if not, he should not be on AC  - Primary team will have to discuss and figure out the patient's disposition and follow up prior to us proceeding with any long term plans like an ablation or anti-coagulation  - If Psoas muscle hematoma is stable, recommend starting IV heparin for AC  - Replete electrolytes to maintain K>4, Mg>2.   - Incentive Spirometry, Out of bed to chair, Encourage physical therapy

## 2021-01-14 NOTE — PROGRESS NOTE ADULT - PROBLEM SELECTOR PLAN 10
F: tolerating PO, no IVF  E: replete K<4, Mg<2  N: puree thin liquids    VTE Prophylaxis: SCDs; heparin 5k subQ; holding full AC in setting of recent bleeds  GI: not needed  C: Full Code  D: 7Lach

## 2021-01-14 NOTE — PROGRESS NOTE ADULT - PROBLEM SELECTOR PLAN 7
LFTs > 7000, with coagulopathy and Utox negative. Acetaminophen level negative. Alcohol level 26. Hx of alcohol use. Liver failure possibly 2/2 to shock liver   - IMPROVING    #Thrombocytopenia   Plt 70 on presentation-->20s Likely 2/2 DIC in setting of septic shock versus ITP. Per Heme/Onc, HLH was initially a consideration given markedly elevated ferritin (30k) Send CD25/IL-2, NK cell activity.  However it is unlikely with improving LDH and transaminases have markedly improved.   - Continue to monitor CBC

## 2021-01-14 NOTE — PROGRESS NOTE ADULT - PROBLEM SELECTOR PLAN 5
Pt not hypoxic but with tachypnea, increased WOB with use of accessory muscles. Pt intubated due to concern for eventual respiratory failure and air way protection  - EXTUBATED 1/7 without complications. s/p duoneb, hypertonic saline, chest physiotherapy q4h for 2 days (1/8-1/10)   - Currently on 2L NC satting >95%  - PT/OT for deconditioned state     #PNA vs atelectasis  - Completed 7 day zosyn course (-1/8)

## 2021-01-14 NOTE — PROGRESS NOTE ADULT - PROBLEM SELECTOR PLAN 1
#L psoas hematoma  New L psoas hematoma found on CT A/P 1/10  - IR consulted for possible embolization;  per IR, will hold off for now given pt is hemodynamically stable  - Will monitor and reconsult IR if pt becomes unable  - If pt needs to go for embolization at any time, will coordinate with renal for HD after procedure to reduce contrast induced nephropathy  - Monitor CBC as above with active T&S    #R Pelvic hematoma  Pt noted to have 7cm hematoma on CT A/P on admission.    #right upper extremity swelling  Noted on 1/12. Dopplers ordered, negative for thrombus.  - continue to monitor    #LE  tenderness and swelling  Patient with b/l swelling worse on left than right. Also ttp in LLE. Patient has been off AC and with recurrent AVNRT. Had LE dopplers on 1/5 negative for DVT.   - f/u LE 1/13 doppler #L psoas hematoma  New L psoas hematoma found on CT A/P 1/10  - IR consulted for possible embolization;  per IR, will hold off for now given pt is hemodynamically stable  - Will monitor and reconsult IR if pt becomes unable  - If pt needs to go for embolization at any time, will coordinate with renal for HD after procedure to reduce contrast induced nephropathy  - Monitor CBC as above with active T&S    #R Pelvic hematoma  Pt noted to have 7cm hematoma on CT A/P on admission.    #right upper extremity swelling  Noted on 1/12. Dopplers ordered, negative for thrombus.  - continue to monitor    #LE  tenderness and swelling  Patient with b/l swelling worse on left than right. Also ttp in LLE. Patient has been off AC and with recurrent AVNRT. Had LE dopplers on 1/5 negative for DVT.   - 1/13 LE doppler negative for DVT

## 2021-01-14 NOTE — PROGRESS NOTE ADULT - PROBLEM SELECTOR PLAN 9
#R pubic rim fracture  Noted on CTA abd/pelvis with adjacent hematoma without active signs of bleeding.  - Ortho consulted. Currently no surgical intervention indicated at this time.    #rheumatoid arthritis  Per collateral obtained by PCP patient w/ hx of RA on prednisone 10 mg   - Continue Prednisone 10mg

## 2021-01-14 NOTE — PROGRESS NOTE ADULT - PROBLEM SELECTOR PROBLEM 4
ESRD (end stage renal disease)
ESRD (end stage renal disease)
Hematoma of muscle
ESRD (end stage renal disease)

## 2021-01-14 NOTE — PROGRESS NOTE ADULT - REASON FOR ADMISSION
AMS, hypothermic,

## 2021-01-14 NOTE — PROGRESS NOTE ADULT - PROBLEM SELECTOR PLAN 8
#Elevated INR  In the setting of acute liver failure and DIC likely from hypothermia. Repeat INR 2.39  - s/p IV vitamin K 10mg daily x 3 days per GI  - s/p 4 U FFP (01/01), 1 U Cryoprecipitate (01/01)    #RUQ tenderness  Patient complained of RUQ pain on evening of 1/12. TTP to palpation in RUQ. Previous u/s completed on 1/1 showing dilated pancreatic duct 5mm. LFTs improving.   - f/u RUQ u/s  - consider MRI (per radiology, patient able to do scan with contrast despite renal fxn)

## 2021-01-15 VITALS — TEMPERATURE: 100 F

## 2021-01-15 LAB — GLUCOSE BLDC GLUCOMTR-MCNC: 189 MG/DL — HIGH (ref 70–99)

## 2021-01-15 PROCEDURE — 85379 FIBRIN DEGRADATION QUANT: CPT

## 2021-01-15 PROCEDURE — 96365 THER/PROPH/DIAG IV INF INIT: CPT | Mod: XU

## 2021-01-15 PROCEDURE — 87635 SARS-COV-2 COVID-19 AMP PRB: CPT

## 2021-01-15 PROCEDURE — 80307 DRUG TEST PRSMV CHEM ANLYZR: CPT

## 2021-01-15 PROCEDURE — 84436 ASSAY OF TOTAL THYROXINE: CPT

## 2021-01-15 PROCEDURE — 81001 URINALYSIS AUTO W/SCOPE: CPT

## 2021-01-15 PROCEDURE — 93975 VASCULAR STUDY: CPT

## 2021-01-15 PROCEDURE — 92610 EVALUATE SWALLOWING FUNCTION: CPT

## 2021-01-15 PROCEDURE — 84540 ASSAY OF URINE/UREA-N: CPT

## 2021-01-15 PROCEDURE — 82607 VITAMIN B-12: CPT

## 2021-01-15 PROCEDURE — 94640 AIRWAY INHALATION TREATMENT: CPT

## 2021-01-15 PROCEDURE — 83690 ASSAY OF LIPASE: CPT

## 2021-01-15 PROCEDURE — 85730 THROMBOPLASTIN TIME PARTIAL: CPT

## 2021-01-15 PROCEDURE — 83880 ASSAY OF NATRIURETIC PEPTIDE: CPT

## 2021-01-15 PROCEDURE — 76775 US EXAM ABDO BACK WALL LIM: CPT

## 2021-01-15 PROCEDURE — 94002 VENT MGMT INPAT INIT DAY: CPT

## 2021-01-15 PROCEDURE — 84484 ASSAY OF TROPONIN QUANT: CPT

## 2021-01-15 PROCEDURE — 90935 HEMODIALYSIS ONE EVALUATION: CPT

## 2021-01-15 PROCEDURE — 82150 ASSAY OF AMYLASE: CPT

## 2021-01-15 PROCEDURE — 86923 COMPATIBILITY TEST ELECTRIC: CPT

## 2021-01-15 PROCEDURE — 80076 HEPATIC FUNCTION PANEL: CPT

## 2021-01-15 PROCEDURE — 99291 CRITICAL CARE FIRST HOUR: CPT | Mod: 25

## 2021-01-15 PROCEDURE — 96368 THER/DIAG CONCURRENT INF: CPT | Mod: XU

## 2021-01-15 PROCEDURE — 71275 CT ANGIOGRAPHY CHEST: CPT

## 2021-01-15 PROCEDURE — U0003: CPT

## 2021-01-15 PROCEDURE — 84300 ASSAY OF URINE SODIUM: CPT

## 2021-01-15 PROCEDURE — 82962 GLUCOSE BLOOD TEST: CPT

## 2021-01-15 PROCEDURE — 97161 PT EVAL LOW COMPLEX 20 MIN: CPT

## 2021-01-15 PROCEDURE — 84480 ASSAY TRIIODOTHYRONINE (T3): CPT

## 2021-01-15 PROCEDURE — 85610 PROTHROMBIN TIME: CPT

## 2021-01-15 PROCEDURE — 51702 INSERT TEMP BLADDER CATH: CPT

## 2021-01-15 PROCEDURE — 93971 EXTREMITY STUDY: CPT

## 2021-01-15 PROCEDURE — P9011: CPT

## 2021-01-15 PROCEDURE — 96375 TX/PRO/DX INJ NEW DRUG ADDON: CPT | Mod: XU

## 2021-01-15 PROCEDURE — 36430 TRANSFUSION BLD/BLD COMPNT: CPT

## 2021-01-15 PROCEDURE — 87641 MR-STAPH DNA AMP PROBE: CPT

## 2021-01-15 PROCEDURE — 76705 ECHO EXAM OF ABDOMEN: CPT

## 2021-01-15 PROCEDURE — 83036 HEMOGLOBIN GLYCOSYLATED A1C: CPT

## 2021-01-15 PROCEDURE — 83735 ASSAY OF MAGNESIUM: CPT

## 2021-01-15 PROCEDURE — 94003 VENT MGMT INPAT SUBQ DAY: CPT

## 2021-01-15 PROCEDURE — 82570 ASSAY OF URINE CREATININE: CPT

## 2021-01-15 PROCEDURE — 85025 COMPLETE CBC W/AUTO DIFF WBC: CPT

## 2021-01-15 PROCEDURE — 93306 TTE W/DOPPLER COMPLETE: CPT

## 2021-01-15 PROCEDURE — 86900 BLOOD TYPING SEROLOGIC ABO: CPT

## 2021-01-15 PROCEDURE — 82728 ASSAY OF FERRITIN: CPT

## 2021-01-15 PROCEDURE — 92526 ORAL FUNCTION THERAPY: CPT

## 2021-01-15 PROCEDURE — 87040 BLOOD CULTURE FOR BACTERIA: CPT

## 2021-01-15 PROCEDURE — 70450 CT HEAD/BRAIN W/O DYE: CPT

## 2021-01-15 PROCEDURE — 97530 THERAPEUTIC ACTIVITIES: CPT

## 2021-01-15 PROCEDURE — 82746 ASSAY OF FOLIC ACID SERUM: CPT

## 2021-01-15 PROCEDURE — 85049 AUTOMATED PLATELET COUNT: CPT

## 2021-01-15 PROCEDURE — 85598 HEXAGNAL PHOSPH PLTLT NEUTRL: CPT

## 2021-01-15 PROCEDURE — 83550 IRON BINDING TEST: CPT

## 2021-01-15 PROCEDURE — 83540 ASSAY OF IRON: CPT

## 2021-01-15 PROCEDURE — 85045 AUTOMATED RETICULOCYTE COUNT: CPT

## 2021-01-15 PROCEDURE — 86901 BLOOD TYPING SEROLOGIC RH(D): CPT

## 2021-01-15 PROCEDURE — 86920 COMPATIBILITY TEST SPIN: CPT

## 2021-01-15 PROCEDURE — 82553 CREATINE MB FRACTION: CPT

## 2021-01-15 PROCEDURE — 84145 PROCALCITONIN (PCT): CPT

## 2021-01-15 PROCEDURE — 36415 COLL VENOUS BLD VENIPUNCTURE: CPT

## 2021-01-15 PROCEDURE — 82803 BLOOD GASES ANY COMBINATION: CPT

## 2021-01-15 PROCEDURE — 87389 HIV-1 AG W/HIV-1&-2 AB AG IA: CPT

## 2021-01-15 PROCEDURE — 83930 ASSAY OF BLOOD OSMOLALITY: CPT

## 2021-01-15 PROCEDURE — U0005: CPT

## 2021-01-15 PROCEDURE — P9059: CPT

## 2021-01-15 PROCEDURE — 74176 CT ABD & PELVIS W/O CONTRAST: CPT

## 2021-01-15 PROCEDURE — 83520 IMMUNOASSAY QUANT NOS NONAB: CPT

## 2021-01-15 PROCEDURE — P9012: CPT

## 2021-01-15 PROCEDURE — 83935 ASSAY OF URINE OSMOLALITY: CPT

## 2021-01-15 PROCEDURE — 84132 ASSAY OF SERUM POTASSIUM: CPT

## 2021-01-15 PROCEDURE — 86850 RBC ANTIBODY SCREEN: CPT

## 2021-01-15 PROCEDURE — 74018 RADEX ABDOMEN 1 VIEW: CPT

## 2021-01-15 PROCEDURE — 81003 URINALYSIS AUTO W/O SCOPE: CPT

## 2021-01-15 PROCEDURE — 85027 COMPLETE CBC AUTOMATED: CPT

## 2021-01-15 PROCEDURE — 80048 BASIC METABOLIC PNL TOTAL CA: CPT

## 2021-01-15 PROCEDURE — P9037: CPT

## 2021-01-15 PROCEDURE — 85384 FIBRINOGEN ACTIVITY: CPT

## 2021-01-15 PROCEDURE — 82330 ASSAY OF CALCIUM: CPT

## 2021-01-15 PROCEDURE — 84478 ASSAY OF TRIGLYCERIDES: CPT

## 2021-01-15 PROCEDURE — 71045 X-RAY EXAM CHEST 1 VIEW: CPT

## 2021-01-15 PROCEDURE — 96372 THER/PROPH/DIAG INJ SC/IM: CPT | Mod: XU

## 2021-01-15 PROCEDURE — 87640 STAPH A DNA AMP PROBE: CPT

## 2021-01-15 PROCEDURE — 74174 CTA ABD&PLVS W/CONTRAST: CPT

## 2021-01-15 PROCEDURE — 84443 ASSAY THYROID STIM HORMONE: CPT

## 2021-01-15 PROCEDURE — 83615 LACTATE (LD) (LDH) ENZYME: CPT

## 2021-01-15 PROCEDURE — 83605 ASSAY OF LACTIC ACID: CPT

## 2021-01-15 PROCEDURE — 86357 NK CELLS TOTAL COUNT: CPT

## 2021-01-15 PROCEDURE — 86965 POOLING BLOOD PLATELETS: CPT

## 2021-01-15 PROCEDURE — 84295 ASSAY OF SERUM SODIUM: CPT

## 2021-01-15 PROCEDURE — 80074 ACUTE HEPATITIS PANEL: CPT

## 2021-01-15 PROCEDURE — 97116 GAIT TRAINING THERAPY: CPT

## 2021-01-15 PROCEDURE — P9016: CPT

## 2021-01-15 PROCEDURE — 82533 TOTAL CORTISOL: CPT

## 2021-01-15 PROCEDURE — 93970 EXTREMITY STUDY: CPT

## 2021-01-15 PROCEDURE — 80053 COMPREHEN METABOLIC PANEL: CPT

## 2021-01-15 PROCEDURE — 83010 ASSAY OF HAPTOGLOBIN QUANT: CPT

## 2021-01-15 PROCEDURE — 93005 ELECTROCARDIOGRAM TRACING: CPT | Mod: XU

## 2021-01-15 PROCEDURE — 84100 ASSAY OF PHOSPHORUS: CPT

## 2021-01-15 PROCEDURE — 82550 ASSAY OF CK (CPK): CPT

## 2021-01-15 RX ADMIN — Medication 2: at 05:07

## 2021-01-15 RX ADMIN — HEPARIN SODIUM 5000 UNIT(S): 5000 INJECTION INTRAVENOUS; SUBCUTANEOUS at 05:07

## 2021-01-15 RX ADMIN — Medication 10 MILLIGRAM(S): at 05:07

## 2021-01-15 RX ADMIN — Medication 50 MILLIGRAM(S): at 05:07

## 2021-01-17 LAB
CULTURE RESULTS: SIGNIFICANT CHANGE UP
CULTURE RESULTS: SIGNIFICANT CHANGE UP
SPECIMEN SOURCE: SIGNIFICANT CHANGE UP
SPECIMEN SOURCE: SIGNIFICANT CHANGE UP

## 2021-01-21 NOTE — ED ADULT NURSE NOTE - NSSEPSISSUSPECTED_ED_A_ED
Patient is here for MD f/u and cycle 2 of [employer]. Patient reports pain in the back of this head for 3 days this week. Patient fell at home twice this week. One of the falls, he hit his face on the end table. + bruising around left eye.  Patient does not ha Yes

## 2021-01-29 DIAGNOSIS — M06.9 RHEUMATOID ARTHRITIS, UNSPECIFIED: ICD-10-CM
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
## 2021-01-29 DIAGNOSIS — R73.9 HYPERGLYCEMIA, UNSPECIFIED: ICD-10-CM

## 2021-01-29 DIAGNOSIS — N18.6 END STAGE RENAL DISEASE: ICD-10-CM

## 2021-01-29 DIAGNOSIS — J96.01 ACUTE RESPIRATORY FAILURE WITH HYPOXIA: ICD-10-CM

## 2021-01-29 DIAGNOSIS — E16.2 HYPOGLYCEMIA, UNSPECIFIED: ICD-10-CM

## 2021-01-29 DIAGNOSIS — R53.2 FUNCTIONAL QUADRIPLEGIA: ICD-10-CM

## 2021-01-29 DIAGNOSIS — Z79.52 LONG TERM (CURRENT) USE OF SYSTEMIC STEROIDS: ICD-10-CM

## 2021-01-29 DIAGNOSIS — S37.892A CONTUSION OF OTHER URINARY AND PELVIC ORGAN, INITIAL ENCOUNTER: ICD-10-CM

## 2021-01-29 DIAGNOSIS — D69.59 OTHER SECONDARY THROMBOCYTOPENIA: ICD-10-CM

## 2021-01-29 DIAGNOSIS — G93.40 ENCEPHALOPATHY, UNSPECIFIED: ICD-10-CM

## 2021-01-29 DIAGNOSIS — E87.2 ACIDOSIS: ICD-10-CM

## 2021-01-29 DIAGNOSIS — D64.89 OTHER SPECIFIED ANEMIAS: ICD-10-CM

## 2021-01-29 DIAGNOSIS — A41.9 SEPSIS, UNSPECIFIED ORGANISM: ICD-10-CM

## 2021-01-29 DIAGNOSIS — R57.1 HYPOVOLEMIC SHOCK: ICD-10-CM
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
## 2021-01-29 DIAGNOSIS — I47.1 SUPRAVENTRICULAR TACHYCARDIA: ICD-10-CM

## 2021-01-29 DIAGNOSIS — Z51.5 ENCOUNTER FOR PALLIATIVE CARE: ICD-10-CM

## 2021-01-29 DIAGNOSIS — D65 DISSEMINATED INTRAVASCULAR COAGULATION [DEFIBRINATION SYNDROME]: ICD-10-CM

## 2021-01-29 DIAGNOSIS — F10.10 ALCOHOL ABUSE, UNCOMPLICATED: ICD-10-CM
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
## 2021-01-29 DIAGNOSIS — S32.82XA MULTIPLE FRACTURES OF PELVIS WITHOUT DISRUPTION OF PELVIC RING, INITIAL ENCOUNTER FOR CLOSED FRACTURE: ICD-10-CM
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
## 2021-01-29 DIAGNOSIS — I48.92 UNSPECIFIED ATRIAL FLUTTER: ICD-10-CM

## 2021-01-29 DIAGNOSIS — S32.509A UNSPECIFIED FRACTURE OF UNSPECIFIED PUBIS, INITIAL ENCOUNTER FOR CLOSED FRACTURE: ICD-10-CM

## 2021-01-29 DIAGNOSIS — E87.5 HYPERKALEMIA: ICD-10-CM

## 2021-01-29 DIAGNOSIS — K92.0 HEMATEMESIS: ICD-10-CM
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
## 2021-01-29 DIAGNOSIS — K76.0 FATTY (CHANGE OF) LIVER, NOT ELSEWHERE CLASSIFIED: ICD-10-CM

## 2021-01-29 DIAGNOSIS — K72.00 ACUTE AND SUBACUTE HEPATIC FAILURE WITHOUT COMA: ICD-10-CM

## 2021-01-29 DIAGNOSIS — R65.21 SEVERE SEPSIS WITH SEPTIC SHOCK: ICD-10-CM

## 2021-01-29 DIAGNOSIS — T68.XXXA HYPOTHERMIA, INITIAL ENCOUNTER: ICD-10-CM

## 2021-01-29 DIAGNOSIS — J69.0 PNEUMONITIS DUE TO INHALATION OF FOOD AND VOMIT: ICD-10-CM

## 2021-01-29 DIAGNOSIS — T83.021A DISPLACEMENT OF INDWELLING URETHRAL CATHETER, INITIAL ENCOUNTER: ICD-10-CM
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
## 2023-04-13 NOTE — ED ADULT NURSE NOTE - OBJECTIVE STATEMENT
Patient hypothermic, unable to get BP and temp at triage.  Patient wake but not responding when talked to. FS 22
Yes

## 2025-05-09 NOTE — PHYSICAL THERAPY INITIAL EVALUATION ADULT - ORIENTATION, REHAB EVAL
A&O~1 pt unable to state